# Patient Record
Sex: FEMALE | Race: WHITE | Employment: OTHER | URBAN - METROPOLITAN AREA
[De-identification: names, ages, dates, MRNs, and addresses within clinical notes are randomized per-mention and may not be internally consistent; named-entity substitution may affect disease eponyms.]

---

## 2020-08-25 ENCOUNTER — TELEPHONE (OUTPATIENT)
Dept: PAIN MANAGEMENT | Age: 80
End: 2020-08-25

## 2020-09-11 ENCOUNTER — HOSPITAL ENCOUNTER (OUTPATIENT)
Dept: PAIN MANAGEMENT | Age: 80
Discharge: HOME | End: 2020-09-11
Payer: MEDICARE

## 2020-09-11 PROCEDURE — 99443 PR PHYS/QHP TELEPHONE EVALUATION 21-30 MIN: CPT | Performed by: PAIN MEDICINE

## 2020-09-11 PROCEDURE — 99203 OFFICE O/P NEW LOW 30 MIN: CPT

## 2020-09-11 ASSESSMENT — ENCOUNTER SYMPTOMS
EYE REDNESS: 0
SINUS PAIN: 0
SHORTNESS OF BREATH: 0
COUGH: 0
WHEEZING: 0
BACK PAIN: 1
STRIDOR: 0
BOWEL INCONTINENCE: 0

## 2020-09-11 NOTE — CONSULTS
Petra Trimble is a 78 y.o. female evaluated on 9/11/2020. Patient is referred by Heather Keane MD   Modality of virtual service provided -via  telephone   Consent:  Patient and/or health care decision maker is aware that that patient may receive a bill for this telephone service, depending on one's insurance coverage, and has provided verbal consent to proceed: Yes    Patient identification was verified at the start of the visit: Yes    Chief complaint: Petar Trimble is 78 y.o.,  female, with chief complaint of back pain. Referring Diagnosis   M48.061 (ICD-10-CM) - Spinal stenosis, lumbar     Patient is an 57-year-old female with a chief complaint of pain involving the low back with pain radiating towards the left knee of longstanding duration. Patient's pain is lately getting worse. She did physical therapy in the past which did not help the pain. Sometimes the pain radiates towards the left foot. Patient pain is lately getting worse slowly. She was evaluated by neurosurgeon who referred her to the pain clinic. Back Pain   This is a chronic problem. The current episode started more than 1 year ago. The problem occurs constantly. The problem is unchanged. The pain is present in the lumbar spine and sacro-iliac. The quality of the pain is described as aching Earlis Spisarah). The pain radiates to the left foot and left knee. The pain is at a severity of 6/10 (2-10). The pain is moderate. Worse during:  worse as the day progresses. The symptoms are aggravated by bending, standing and twisting (Walking, ADLs  lifting ). Associated symptoms include tingling. Pertinent negatives include no abdominal pain, bladder incontinence, bowel incontinence, chest pain, dysuria, numbness or weakness. Risk factors include lack of exercise. Alleviating factors:rest   Lifestyle changes experienced with pain: Prevents or limits ADLs, Increases w/activity.    Increases w/prolonged Alcohol use: No     Alcohol/week: 0.0 standard drinks    Drug use: No    Sexual activity: Yes     Partners: Male     Birth control/protection: Surgical, Post-menopausal     Comment: hysterectomy BSO   Lifestyle    Physical activity     Days per week: None     Minutes per session: None    Stress: None   Relationships    Social connections     Talks on phone: None     Gets together: None     Attends [employer] service: None     Active member of club or organization: None     Attends meetings of clubs or organizations: None     Relationship status: None    Intimate partner violence     Fear of current or ex partner: None     Emotionally abused: None     Physically abused: None     Forced sexual activity: None   Other Topics Concern    None   Social History Narrative    None       Allergies   Allergen Reactions    Duricef [Cefadroxil] Hives    Macrobid [Nitrofurantoin Monohyd Macro] Hives       Current Outpatient Medications on File Prior to Encounter   Medication Sig Dispense Refill    atenolol (TENORMIN) 50 MG tablet Take 50 mg by mouth daily.  butalbital-acetaminophen-caffeine (FIORICET, ESGIC) per tablet Take 1 tablet by mouth every 4 hours as needed.  celecoxib (CELEBREX) 200 MG capsule Take 200 mg by mouth daily.  metFORMIN (GLUCOPHAGE) 500 MG tablet Take 500 mg by mouth daily (with breakfast).  omeprazole (PRILOSEC) 20 MG capsule Take 20 mg by mouth daily.  Multiple Vitamins-Minerals (PX SENIOR VITAMIN PO) Take  by mouth.  Calcium Carbonate-Vitamin D 600-400 MG-UNIT CHEW Take  by mouth 2 times daily. No current facility-administered medications on file prior to encounter. Review of Systems   Constitutional: Negative. Negative for activity change, appetite change, fatigue and unexpected weight change. HENT: Negative. Negative for congestion, hearing loss and sinus pain. Eyes: Negative. Negative for redness and visual disturbance.    Respiratory: Negative. Negative for cough, shortness of breath, wheezing and stridor. Cardiovascular: Negative. Negative for chest pain and palpitations. Gastrointestinal: Negative. Negative for abdominal pain, bowel incontinence, constipation, diarrhea and nausea. Endocrine: Negative. Negative for cold intolerance and polyuria. History of diabetes mellitus type 2 diabetes mellitus   Genitourinary: Negative for bladder incontinence, dysuria and hematuria. Musculoskeletal: Positive for back pain. Negative for arthralgias. Neurological: Positive for tingling. Negative for tremors, weakness and numbness. Hematological: Negative. Does not bruise/bleed easily. Psychiatric/Behavioral: Negative for self-injury, sleep disturbance and suicidal ideas. The patient is not nervous/anxious. Physical Exam  Skin:         Neurological:      Mental Status: She is alert and oriented to person, place, and time. Psychiatric:         Mood and Affect: Mood normal.            DATA:  LAB.:  Care Everywhere Result Report  Comprehensive metabolic panelResulted: 43/41/9808 11:39 AM  PushCall  Component Name Value Ref Range   Sodium 138 134 - 146 mmol/L   Potassium, Bld 4.4 3.5 - 5 mmol/L   Chloride 104 98 - 109 mmol/L   CO2 26 22 - 32 mmol/L   Anion gap 8 4 - 12 mmol/L   BUN 24 5 - 27 mg/dL   Creatinine 0.90   Comment: METHOD TRACEABLE TO IDMS STANDARD 0.4 - 1 mg/dL   Glucose 116 (H) 65 - 99 mg/dL   Calcium 9.3 8.5 - 10.5 mg/dL   Total Protein 6.8 6 - 8 g/dL   Albumin 4.1 3.2 - 5.3 g/dL   Alkaline Phosphatase 63 39 - 130 U/L   AST 22 0 - 41 U/L   ALT 15 0 - 31 U/L   Total bilirubin 0.4 0.3 - 1.2 mg/dL   GFR MDRD Non Af Amer >60 >59 ml/min/1.73sq. m   GFR MDRD Af Amer >60 >59 ml/min/1.73sq. m       X-Ray reports:  MR lumbar without contrast8/18/2020  PushCall  Result Narrative         History: Pain.     Study: Low back pain    Technique: Multiplanar/multisequence images were obtained of the lumbar spine. No contrast was used. Findings: Position of the conus medullaris is within normal limits . Vertebral body heights and alignments are normal. The marrow signal is normal.    L1-2 disc space is normal    L2-L3 disc space is normal    L3-L4 has facet joint and ligamentum flavum hypertrophy with broad-based disc bulging resulting in moderate canal narrowing.  There is mild left and moderate right foraminal narrowing     L4-L5has facet joint and ligamentum flavum hypertrophy with broad-based disc bulging results in moderate canal narrowing.  There is broad-based disc bulging resulting in bilateral inferior foraminal narrowing     L5-S1has facet joint arthropathy and ligamentum flavum hypertrophy without significant canal narrowing.  There is broad-based disc bulging causing left foraminal stenosis      Impression:  Multilevel degenerative change with left foraminal stenosis L5-S1. Finalized by Evelyn Luther MD on 8/18/2020 12     Clinical  impression:  1. Lumbar radiculopathy    2. DDD (degenerative disc disease), lumbar    3. Lumbosacral spondylosis without myelopathy        Patient's   [] x-ray    [] CT scan    [x] MRI  Were/was  Reviewed. These findings are consistent with the patient's symptoms and physical examination. [x] Patient's findings on the x-ray were explained to the patient     Other reports reviewed include    [] Bone scan   [] EMG and nerve conduction studies   [x] Referral reports-  I also discussed with him the following treatment options Including advantages and disadvantages of each:    [x] Physical therapy    [x] Interventional pain treatment    [] Medication management    [] Surgical options    Patient's OARRS were reviewed. It is acceptable and appears patient is not receiving prescriptions from multiple prescribers.   Patient is  forthcoming regarding prescriptions for pain medication in the past  Controlled Substances Monitoring:        Counselling/Preventive measures for pain  Control:    [x]  Spine strengthening exercises are discussed with patient in detail. The following treatment plan was developed after discussion with patient:    We discussed transforaminal lumbar Epidural steroid Injections x 1  at L5-S1 on the left side    Patient tried and failed NSAIDS,Home exercises, Physical Therapy, Chiropractic manipulations without relief. Patient exhibited signs of radiculopathy on left    Patient has not had prior Lumbar Surgery. We will see the patient in 2 weeks after the procedures and re-evaluate symptoms. Orders Placed This Encounter   Procedures    FLUORO FOR SURGICAL PROCEDURES     Standing Status:   Future     Standing Expiration Date:   9/11/2021    SD INJECT ANES/STEROID FORAMEN LUMBAR/SACRAL W IMG GUIDE ,1 LEVEL    Saline lock IV     Standing Status:   Future     Standing Expiration Date:   3/11/2022       Decision Making Process : Patient's health history and referral records thoroughly reviewed before focused physical examination and discussion with patient. Over 50% of today's visit is spent on examining the patient and counseling. Level of complexity of date to be reviewed is Moderate. The chart date reviewed include the following: Imaging Reports. Summary of Care. Time spent reviewing with patient the below reports:   Medication safety, Treatment options. Level of diagnosis and management options of this case is multiple: involving the following management options: Interventions as needed, medication management as appropriate, future visits, activity modification, heat/ice as needed, Urine drug screen as required. [x]The patient's questions were answered to the best of my abilities. Due to the COVID-19 pandemic and the appropriate interventions by [facility], our non-urgent pain management patients will not be seen in the office at this time for their protection and the protection of our staff.  To offer continuity of care, their prescriptions will be escribed this month after a careful chart review and review of their OARRS report  Pursuant to the emergency declaration under the [employer] [facility], [facility]5 waiver authority and the [employer] Act, this Virtual Visit was conducted, with patient's consent, to reduce the patient's risk of exposure to COVID-19 and provide continuity of care for an established patient. Services were provided through a video synchronous discussion virtually to substitute for in-person appointment. \"  Documentation:  I communicated with the patient and/or health care decision maker about plan of care  Details of this discussion including any medical advice provided: Total Time: 45 to 55 minutes    I affirm this is a Patient Initiated Episode with an Established Patient who has not had a related appointment within my department in the past 7 days or scheduled within the next 24 hours. This note was created using voice recognition software. There may be inaccuracies of transcription  that are inadvertently overlooked prior to the signature. There is any questions about the transcription please contact me.     Electronically signed by Lindsey Gamboa MD on 9/11/2020 at 9:45 AM

## 2020-09-12 ASSESSMENT — ENCOUNTER SYMPTOMS
CONSTIPATION: 0
EYES NEGATIVE: 1
GASTROINTESTINAL NEGATIVE: 1
ABDOMINAL PAIN: 0
DIARRHEA: 0
RESPIRATORY NEGATIVE: 1
NAUSEA: 0

## 2020-10-05 NOTE — PROGRESS NOTES
Shana Raines is a 78 y.o. female evaluated on 10/8/2020. Chief complaint: Shana Raines is 78 y.o.,  female, with low back pain with pain radiating to the left lower extremity. HPI   Patient is an 49-year-old female referred to the pain clinic for back pain and is scheduled for a transforaminal lumbar epidural steroid injection today. Initial visit was on 2020 which was a virtual visit. A physical examination is not done at that time. A physical examination is going to be done today to complete the evaluation.   Please refer to the notes on 2024 complete history      Past Medical History:   Diagnosis Date    Arthritis     Diabetes mellitus ([address]     High cholesterol     Migraines     Vision abnormalities        Past Surgical History:   Procedure Laterality Date    APPENDECTOMY      CHOLECYSTECTOMY      COLONOSCOPY  10/12/07    HYSTERECTOMY      Ovaries removed    ORIF HUMERUS DECOMPRESSION Left 2010    SALPINGO-OOPHORECTOMY  1983    TONSILLECTOMY AND ADENOIDECTOMY  1962       Family History   Problem Relation Age of Onset    Breast Cancer Paternal Cousin         >48 yo    Cancer Neg Hx     Colon Cancer Neg Hx     Diabetes Neg Hx     Eclampsia Neg Hx     Hypertension Neg Hx     Ovarian Cancer Neg Hx      Labor Neg Hx     Spont Abortions Neg Hx     Stroke Neg Hx        Social History     Socioeconomic History    Marital status:      Spouse name: None    Number of children: None    Years of education: None    Highest education level: None   Occupational History    None   Social Needs    Financial resource strain: None    Food insecurity     Worry: None     Inability: None    Transportation needs     Medical: None     Non-medical: None   Tobacco Use    Smoking status: Never Smoker    Smokeless tobacco: Never Used   Substance and Sexual Activity    Alcohol use: No     Alcohol/week: 0.0 standard drinks    Drug use: No    and reactive to light. Neck:      Musculoskeletal: Normal range of motion and neck supple. No muscular tenderness. Cardiovascular:      Rate and Rhythm: Normal rate. Pulmonary:      Effort: Pulmonary effort is normal.      Breath sounds: Normal breath sounds. Abdominal:      General: Bowel sounds are normal.      Palpations: Abdomen is soft. Musculoskeletal:      Right lower leg: No edema. Left lower leg: No edema. Skin:     Findings: No erythema or lesion. Neurological:      Mental Status: She is alert and oriented to person, place, and time. Cranial Nerves: No cranial nerve deficit. Sensory: No sensory deficit. Psychiatric:         Mood and Affect: Mood normal.         Behavior: Behavior normal.         Thought Content: Thought content normal.         Judgment: Judgment normal.        Right Ankle Exam     Muscle Strength   The patient has normal right ankle strength. Left Ankle Exam     Muscle Strength   The patient has normal left ankle strength. Right Knee Exam     Muscle Strength   The patient has normal right knee strength. Left Knee Exam     Muscle Strength   The patient has normal left knee strength. Right Hip Exam     Muscle Strength   The patient has normal right hip strength. Left Hip Exam     Muscle Strength   The patient has normal left hip strength. Back Exam     Tenderness   The patient is experiencing tenderness in the lumbar and sacroiliac. Range of Motion   Back extension: Limited and painful. Back flexion: Limited and painful. Back lateral bend right: Limited and painful. Back lateral bend left: Limited and painful. Back rotation right: Limited and painful. Back rotation left: Limited and painful.      Tests   Straight leg raise left: positive    Other   Sensation: normal  Gait: antalgic   Erythema: no back redness  Scars: absent          DATA:      X-Ray reports:  MR lumbar without contrast8/18/2020  [facility] [facility]  Result Narrative         History: Pain. Study: Low back pain    Technique: Multiplanar/multisequence images were obtained of the lumbar spine. No contrast was used. Findings: Position of the conus medullaris is within normal limits . Vertebral body heights and alignments are normal. The marrow signal is normal.    L1-2 disc space is normal    L2-L3 disc space is normal    L3-L4 has facet joint and ligamentum flavum hypertrophy with broad-based disc bulging resulting in moderate canal narrowing.  There is mild left and moderate right foraminal narrowing     L4-L5has facet joint and ligamentum flavum hypertrophy with broad-based disc bulging results in moderate canal narrowing.  There is broad-based disc bulging resulting in bilateral inferior foraminal narrowing     L5-S1has facet joint arthropathy and ligamentum flavum hypertrophy without significant canal narrowing.  There is broad-based disc bulging causing left foraminal stenosis      Impression:  Multilevel degenerative change with left foraminal stenosis L5-S1. Finalized by Samaria Damon MD on 8/18/2020 12       Clinical  impression:  1. Lumbar radiculopathy    2. DDD (degenerative disc disease), lumbar    3. Lumbosacral spondylosis without myelopathy        Plan of care: As planned we will proceed with transforaminal lumbar epidural steroid injection at  The following treatment plan was developed after discussion with patient:    We discussed transforaminal lumbar Epidural steroid Injections x 1  at L5-S1/l4-5 on the left side    Patient tried and failed NSAIDS,Home exercises, Physical Therapy, Chiropractic manipulations without relief. Patient exhibited signs of radiculopathy with positive straight leg raising test on left    Patient has not had prior Lumbar Surgery. We will see the patient in 2 weeks after the procedures and re-evaluate symptoms.     The procedure risks as well as alternate therapies were discussed with the patient

## 2020-10-08 ENCOUNTER — HOSPITAL ENCOUNTER (OUTPATIENT)
Dept: GENERAL RADIOLOGY | Age: 80
Discharge: HOME | End: 2020-10-10
Payer: MEDICARE

## 2020-10-08 ENCOUNTER — HOSPITAL ENCOUNTER (OUTPATIENT)
Dept: PAIN MANAGEMENT | Age: 80
Discharge: HOME | End: 2020-10-08
Payer: MEDICARE

## 2020-10-08 VITALS
SYSTOLIC BLOOD PRESSURE: 153 MMHG | TEMPERATURE: 97.9 F | RESPIRATION RATE: 14 BRPM | DIASTOLIC BLOOD PRESSURE: 79 MMHG | OXYGEN SATURATION: 98 % | HEART RATE: 59 BPM | HEIGHT: 62 IN | BODY MASS INDEX: 27.6 KG/M2 | WEIGHT: 150 LBS

## 2020-10-08 PROCEDURE — 6360000004 HC RX CONTRAST MEDICATION: Performed by: PAIN MEDICINE

## 2020-10-08 PROCEDURE — 62323 NJX INTERLAMINAR LMBR/SAC: CPT

## 2020-10-08 PROCEDURE — 6360000002 HC RX W HCPCS: Performed by: PAIN MEDICINE

## 2020-10-08 PROCEDURE — 3209999900 FLUORO FOR SURGICAL PROCEDURES

## 2020-10-08 PROCEDURE — 64483 NJX AA&/STRD TFRM EPI L/S 1: CPT | Performed by: PAIN MEDICINE

## 2020-10-08 PROCEDURE — 2500000003 HC RX 250 WO HCPCS: Performed by: PAIN MEDICINE

## 2020-10-08 RX ORDER — TRAMADOL HYDROCHLORIDE 50 MG/1
50 TABLET ORAL EVERY 6 HOURS PRN
COMMUNITY

## 2020-10-08 RX ORDER — SIMVASTATIN 40 MG
40 TABLET ORAL NIGHTLY
COMMUNITY

## 2020-10-08 RX ORDER — LIDOCAINE HYDROCHLORIDE 10 MG/ML
INJECTION, SOLUTION EPIDURAL; INFILTRATION; INTRACAUDAL; PERINEURAL
Status: COMPLETED | OUTPATIENT
Start: 2020-10-08 | End: 2020-10-08

## 2020-10-08 RX ORDER — TRIAMCINOLONE ACETONIDE 40 MG/ML
INJECTION, SUSPENSION INTRA-ARTICULAR; INTRAMUSCULAR
Status: COMPLETED | OUTPATIENT
Start: 2020-10-08 | End: 2020-10-08

## 2020-10-08 RX ORDER — CARVEDILOL 25 MG/1
25 TABLET ORAL 2 TIMES DAILY WITH MEALS
COMMUNITY

## 2020-10-08 RX ORDER — BENAZEPRIL HYDROCHLORIDE 10 MG/1
10 TABLET ORAL DAILY
COMMUNITY

## 2020-10-08 RX ADMIN — LIDOCAINE HYDROCHLORIDE 4 ML: 10 INJECTION, SOLUTION EPIDURAL; INFILTRATION; INTRACAUDAL; PERINEURAL at 10:12

## 2020-10-08 RX ADMIN — IOHEXOL 1 ML: 180 INJECTION INTRAVENOUS at 10:13

## 2020-10-08 RX ADMIN — TRIAMCINOLONE ACETONIDE 80 MG: 40 INJECTION, SUSPENSION INTRA-ARTICULAR; INTRAMUSCULAR at 10:13

## 2020-10-08 ASSESSMENT — PAIN DESCRIPTION - ONSET: ONSET: GRADUAL

## 2020-10-08 ASSESSMENT — PAIN DESCRIPTION - ORIENTATION: ORIENTATION: LEFT

## 2020-10-08 ASSESSMENT — PAIN DESCRIPTION - FREQUENCY: FREQUENCY: CONTINUOUS

## 2020-10-08 ASSESSMENT — PAIN - FUNCTIONAL ASSESSMENT
PAIN_FUNCTIONAL_ASSESSMENT: PREVENTS OR INTERFERES SOME ACTIVE ACTIVITIES AND ADLS
PAIN_FUNCTIONAL_ASSESSMENT: 0-10

## 2020-10-08 ASSESSMENT — PAIN DESCRIPTION - PAIN TYPE: TYPE: CHRONIC PAIN

## 2020-10-08 ASSESSMENT — PAIN DESCRIPTION - PROGRESSION: CLINICAL_PROGRESSION: GRADUALLY WORSENING

## 2020-10-08 ASSESSMENT — PAIN SCALES - GENERAL: PAINLEVEL_OUTOF10: 8

## 2020-10-08 ASSESSMENT — PAIN DESCRIPTION - DESCRIPTORS: DESCRIPTORS: SHARP;SHOOTING;CONSTANT

## 2020-10-08 NOTE — PROCEDURES
Pre-Procedure Note    Patient Name: Marjan Retana   YOB: 1940  Room/Bed: Room/bed info not found  Medical Record Number: [id number]  Date: 10/8/2020       Indication:  Active Problems:    * No active hospital problems. *  Resolved Problems:    * No resolved hospital problems. *       Consent: On file. Vital Signs:   Vitals:    10/08/20 1021   BP: (!) 153/79   Pulse: 59   Resp: 14   Temp:    SpO2: 98%       Past Medical History:   has a past medical history of Arthritis, Diabetes mellitus ([address] High cholesterol, Migraines, and Vision abnormalities. Past Surgical History:   has a past surgical history that includes Colonoscopy (10/12/07); Tonsillectomy and adenoidectomy (1962); Appendectomy (1949); Cholecystectomy (1971); Salpingo-oophorectomy (1983); orif humerus decompression (Left, 2010); and Hysterectomy (1983). Pre-Sedation Documentation and Exam:   Vital signs have been reviewed (see flow sheet for vitals). Mallampati Airway Assessment:  normal    ASA Classification:  Class 3 - A patient with severe systemic disease that limits activity but is not incapacitating    Sedation/ Anesthesia Plan:   intravenous sedation  as needed. Medications Planned:   midazolam (Versed) / Fentanyl  Intravenously  as needed. Patient is an appropriate candidate for plan of sedation: yes  Patient's History and Physical examination was reviewed and there is no change. Electronically signed by Cortez Pa MD on 10/8/2020 at 10:26 AM        Preoperative Diagnosis:    1. Lumbar radiculopathy    2. DDD (degenerative disc disease), lumbar    3. Lumbosacral spondylosis without myelopathy         Postoperative diagnosis:    1. Lumbar radiculopathy    2. DDD (degenerative disc disease), lumbar    3.  Lumbosacral spondylosis without myelopathy         Procedure Performed:  Transforaminal lumbar epidural steroid injection at the levels of L4 - L5 on the Left side under fluoroscopic guidance without IV sedation. Indication for the Procedure: The patient failed conservative management  for pain in the low back radiating to lower extremities. As the patient is not responding to conservative management and pain is interfering with activities of daily living, we decided to proceed with transforaminal lumbar epidural steroid injection as symptoms are mostly following the nerve root distribution. The procedure and the risks were discussed with the patient and informed consent was obtained. Current Pain Assessment  Pain Assessment  Pain Assessment: 0-10  Pain Level: 8  Patient's Stated Pain Goal: 2(to decrease pain with increased activity)  Pain Type: Chronic pain  Pain Location: Back, Buttocks, Hip, Leg, Knee, Foot  Pain Orientation: Left  Pain Radiating Towards: none  Pain Descriptors: Sharp, Shooting, Constant  Pain Frequency: Continuous  Pain Onset: Gradual  Clinical Progression: Gradually worsening  Functional Pain Assessment: Prevents or interferes some active activities and ADLs  Non-Pharmaceutical Pain Intervention(s): Rest, Repositioned, Relaxation techniques     Procedure:    . The patient's vital signs including BP, EKG and SaO2 were monitored by the RN and they remained stable during the procedure. A meaningful communication was kept up with the patient throughout   the procedure. The patient is placed in prone position. The skin over the back was prepped and draped in sterile manner. Then the skin and deep tissues just above the tip of the transverse process of L-5 vertebra on Left side were infiltrated with about 4 ml of 1% lidocaine. The #22-gauge, 5 inch spinal needle was inserted through the skin wheal  and under fluoroscopy guidance was directed such that the tip of the needle lies in the neuroforamina at about the 6 o'clock position under the pedicle of the L-4 vertebra. This was confirmed with AP and lateral views of the fluoroscopy.    Then after negative aspiration a total of 1 ml of Omnipaque-180 was injected through the needle and spread of the contrast in the epidural space as well as along the nerve root was observed. Then after negative aspiration a total of 80 mg of triamcinolone and 3 ml of normal saline was injected through the needle. The needle is removed and a Band-Aid was placed over the needle  insertion site. The patient's vital signs remained stable and the patient tolerated the procedure well. The patient was discharged home in stable condition and will be followed in the pain clinic in the next few weeks for further planning.     Electronically signed by Cortez Pa MD on 10/8/2020 at 10:26 AM

## 2020-10-08 NOTE — PROGRESS NOTES
Discharge criteria met. Patient alert and oriented x3  Post procedure dressing dry and intact. Sensory and motor function intact as per pre-procedure. Instructions and follow up reviewed with pt at patient at discharge. Patient discharged ambulatory @ 10:30am  With .  to drive pt home and care for her today. Steady gait.

## 2020-10-12 ENCOUNTER — TELEPHONE (OUTPATIENT)
Dept: PAIN MANAGEMENT | Age: 80
End: 2020-10-12

## 2020-10-22 ENCOUNTER — HOSPITAL ENCOUNTER (OUTPATIENT)
Dept: PAIN MANAGEMENT | Age: 80
Discharge: HOME | End: 2020-10-22
Payer: MEDICARE

## 2020-10-22 PROCEDURE — 99213 OFFICE O/P EST LOW 20 MIN: CPT

## 2020-10-22 PROCEDURE — 99443 PR PHYS/QHP TELEPHONE EVALUATION 21-30 MIN: CPT | Performed by: PAIN MEDICINE

## 2020-10-22 ASSESSMENT — ENCOUNTER SYMPTOMS
BACK PAIN: 1
CONSTIPATION: 0
EYE REDNESS: 0
WHEEZING: 0
RESPIRATORY NEGATIVE: 1
NAUSEA: 0
SHORTNESS OF BREATH: 0
VOMITING: 0
SORE THROAT: 0
EYES NEGATIVE: 1
BOWEL INCONTINENCE: 0
ABDOMINAL PAIN: 0
COUGH: 0
DIARRHEA: 0
PHOTOPHOBIA: 0
STRIDOR: 0
SINUS PAIN: 0
GASTROINTESTINAL NEGATIVE: 1

## 2020-10-22 NOTE — PROGRESS NOTES
Belem Sumner is a 78 y.o. female evaluated on 10/22/2020. Modality of virtual service provided -via  telephone   Consent:  Patient and/or health care decision maker is aware that that patient may receive a bill for this telephone service, depending on one's insurance coverage, and has provided verbal consent to proceed: Yes    Patient identification was verified at the start of the visit: Yes    Chief complaint: Belem Sumner is 78 y.o.,  female, with chief complaint of back pain. Referring Diagnosis   M48.061 (ICD-10-CM) - Spinal stenosis, lumbar     Patient is complaining of pain involving the low back area with pain radiating to left lower extremity. Most of the time the pain radiates from the low back to the left knee and occasionally towards the ankle. She had undergone lumbar epidural steroid injection on 10/8/2020 without any improvement in the pain. She reports since the procedure she is getting muscle spasms in the lower extremities which occasionally wakes her up from the sleep. Back Pain   This is a chronic problem. The current episode started more than 1 year ago. The problem occurs constantly. The problem is unchanged. The pain is present in the lumbar spine and sacro-iliac. The quality of the pain is described as aching [place]). The pain radiates to the left foot and left knee. The pain is at a severity of 6/10 (2-10). The pain is moderate. Worse during:  worse as the day progresses. The symptoms are aggravated by bending, standing and twisting (Walking, ADLs  lifting ). Associated symptoms include tingling. Pertinent negatives include no abdominal pain, bladder incontinence, bowel incontinence, chest pain, dysuria, numbness or weakness. Risk factors include lack of exercise.       Alleviating factors:rest   Lifestyle changes experienced with pain: Wakes from sleep, Prevents or limits ADLs, Increases w/activity. , Increases w/prolonged sitting/standing/walking  Mood changes,none  Patient currently unemployed. Physical therapy did not help the pain. Are you under psychological counseling at present: No  Goals for treatment include:  Decrease in pain  Enjoy daily and recreational activities, return to previous status.     Last procedure was lumbar epidural steroid injection and 10/08/2020      ACTIVITY/SOCIAL/EMOTIONAL:  Sleep Pattern: 7 hours per night. nightime awakenings  Home Exercises:  none  Activity:not significantly changed  Emotional Issues: normal.   Currently seeing a Psychiatrist or Psychologist:  No      Past Medical History:   Diagnosis Date    Arthritis     Diabetes mellitus ([facility] [address]     High cholesterol     Migraines     Vision abnormalities        Past Surgical History:   Procedure Laterality Date    APPENDECTOMY      CHOLECYSTECTOMY      COLONOSCOPY  10/12/07    HYSTERECTOMY  1983    Ovaries removed    ORIF HUMERUS DECOMPRESSION Left 2010    SALPINGO-OOPHORECTOMY  1983    TONSILLECTOMY AND ADENOIDECTOMY         Family History   Problem Relation Age of Onset    Breast Cancer Paternal Cousin         >46 yo    Cancer Neg Hx     Colon Cancer Neg Hx     Diabetes Neg Hx     Eclampsia Neg Hx     Hypertension Neg Hx     Ovarian Cancer Neg Hx      Labor Neg Hx     Spont Abortions Neg Hx     Stroke Neg Hx        Social History     Socioeconomic History    Marital status:      Spouse name: None    Number of children: None    Years of education: None    Highest education level: None   Occupational History    None   Social Needs    Financial resource strain: None    Food insecurity     Worry: None     Inability: None    Transportation needs     Medical: None     Non-medical: None   Tobacco Use    Smoking status: Never Smoker    Smokeless tobacco: Never Used   Substance and Sexual Activity    Alcohol use: No     Alcohol/week: 0.0 standard drinks    Drug use: No    Sexual activity: Yes     Partners: Male     Birth control/protection: Surgical, Post-menopausal     Comment: hysterectomy BSO   Lifestyle    Physical activity     Days per week: None     Minutes per session: None    Stress: None   Relationships    Social connections     Talks on phone: None     Gets together: None     Attends Church service: None     Active member of club or organization: None     Attends meetings of clubs or organizations: None     Relationship status: None    Intimate partner violence     Fear of current or ex partner: None     Emotionally abused: None     Physically abused: None     Forced sexual activity: None   Other Topics Concern    None   Social History Narrative    None       Allergies   Allergen Reactions    Duricef [Cefadroxil] Hives    Lipitor [Atorvastatin Calcium]      Muscle pain    Macrobid [Nitrofurantoin Monohyd Macro] Hives       Current Outpatient Medications on File Prior to Encounter   Medication Sig Dispense Refill    carvedilol (COREG) 25 MG tablet Take 25 mg by mouth 2 times daily (with meals)      benazepril (LOTENSIN) 10 MG tablet Take 10 mg by mouth daily      traMADol (ULTRAM) 50 MG tablet Take 50 mg by mouth every 6 hours as needed for Pain.  simvastatin (ZOCOR) 40 MG tablet Take 40 mg by mouth nightly      atenolol (TENORMIN) 50 MG tablet Take 50 mg by mouth daily.  butalbital-acetaminophen-caffeine (FIORICET, ESGIC) per tablet Take 1 tablet by mouth every 4 hours as needed.  celecoxib (CELEBREX) 200 MG capsule Take 200 mg by mouth daily.  metFORMIN (GLUCOPHAGE) 500 MG tablet Take 500 mg by mouth daily (with breakfast).  omeprazole (PRILOSEC) 20 MG capsule Take 20 mg by mouth daily.  Multiple Vitamins-Minerals (PX SENIOR VITAMIN PO) Take  by mouth.  Calcium Carbonate-Vitamin D 600-400 MG-UNIT CHEW Take  by mouth 2 times daily. No current facility-administered medications on file prior to encounter. Review of Systems   Constitutional: Negative.   Negative for activity change, appetite change, fatigue and unexpected weight change. HENT: Negative. Negative for congestion, hearing loss, sinus pain and sore throat. Eyes: Negative. Negative for photophobia, redness and visual disturbance. Respiratory: Negative. Negative for cough, shortness of breath, wheezing and stridor. Cardiovascular: Negative. Negative for chest pain and palpitations. Gastrointestinal: Negative. Negative for abdominal pain, bowel incontinence, constipation, diarrhea, nausea and vomiting. Endocrine: Negative. Negative for cold intolerance, polydipsia and polyuria. History of diabetes mellitus type 2 diabetes mellitus   Genitourinary: Negative for bladder incontinence, dysuria and hematuria. Musculoskeletal: Positive for back pain. Negative for arthralgias. Skin: Negative for wound. Neurological: Positive for tingling. Negative for tremors, speech difficulty, weakness and numbness. Hematological: Negative. Does not bruise/bleed easily. Psychiatric/Behavioral: Negative for self-injury, sleep disturbance and suicidal ideas. The patient is not nervous/anxious. Physical Exam  Skin:         Neurological:      Mental Status: She is alert and oriented to person, place, and time. Psychiatric:         Mood and Affect: Mood normal.            DATA:    X-Ray reports:  MR lumbar without contrast8/18/2020  [employer]  Result Narrative         History: Pain. Study: Low back pain    Technique: Multiplanar/multisequence images were obtained of the lumbar spine. No contrast was used. Findings: Position of the conus medullaris is within normal limits . Vertebral body heights and alignments are normal. The marrow signal is normal.    L1-2 disc space is normal    L2-L3 disc space is normal    L3-L4 has facet joint and ligamentum flavum hypertrophy with broad-based disc bulging resulting in moderate canal narrowing.   There is mild left and moderate right foraminal narrowing     L4-L5has facet joint and ligamentum flavum hypertrophy with broad-based disc bulging results in moderate canal narrowing. There is broad-based disc bulging resulting in bilateral inferior foraminal narrowing     L5-S1has facet joint arthropathy and ligamentum flavum hypertrophy without significant canal narrowing. There is broad-based disc bulging causing left foraminal stenosis      Impression:  Multilevel degenerative change with left foraminal stenosis L5-S1. Finalized by Scooby Farias MD on 8/18/2020 12       Clinical  impression:  1. Lumbar radiculopathy    2. DDD (degenerative disc disease), lumbar    3. Lumbosacral spondylosis without myelopathy        Plan of care:  I discussed with the patient regarding her options for pain control including surgery medications and repeating epidural steroid injection. Patient would like to avoid surgery if possible. She is not very keen on medications because of her side effects. She would like to try another epidural steroid injection and then depending on the response will plan further treatment. I discussed the procedure and risks and patient wants to proceed with it again. Hence we will schedule it in the near future. The following treatment plan was developed after discussion with patient:    We discussed Lumbar Epidural steroid Injections x 1  at L4 - L5. Patient tried and failed NSAIDS,Home exercises, Physical Therapy, Chiropractic manipulations without relief. Patient exhibited signs of radiculopathy with positive straight leg raising test on left    Patient has not had prior Lumbar Surgery. We will see the patient in 2 weeks after the procedures and re-evaluate symptoms. Handy Lozada     PDMP Monitoring:    Last PDMP Rachelle Santiago as Reviewed [facility]):  Review User Review Instant Review Result   Linda Isaac 10/22/2020  7:05 AM Reviewed PDMP [1]     Counselling/Preventive measures for pain  Control:    [x]  Spine strengthening exercises are discussed with patient in detail. Orders Placed This Encounter   Procedures    Lumbar Epidural Steroid Injection/Caudal     Standing Status:   Future     Standing Expiration Date:   10/22/2021    FLUORO FOR SURGICAL PROCEDURES     Standing Status:   Future     Standing Expiration Date:   10/22/2021    Saline lock IV     Standing Status:   Future     Standing Expiration Date:   4/22/2022       Decision Making Process : Patient's health history and referral records thoroughly reviewed before focused physical examination and discussion with patient. I have spent 25 mins. Over 50% of today's visit is spent on examining the patient and counseling and coordinating the care. Level of complexity of date to be reviewed is Moderate. The chart date reviewed include the following: Imaging Reports. Summary of Care. Time spent reviewing with patient the below reports:   Medication safety, Treatment options. Level of diagnosis and management options of this case is multiple: involving the following management options: Interventions as needed, medication management as appropriate, future visits, activity modification, heat/ice as needed, Urine drug screen as required. [x]The patient's questions were answered to the best of my abilities. This note was created using voice recognition software. There may be inaccuracies of transcription  that are inadvertently overlooked prior to the signature. There is any questions about the transcription please contact me. Digna Nunn Due to the COVID-19 pandemic and the appropriate interventions by Ryan Esteban, our non-urgent pain management patients will not be seen in the office at this time for their protection and the protection of our staff.  To offer continuity of care, their prescriptions will be escribed this month after a careful chart review and review of their OARRS report  Pursuant to the emergency declaration under the [address] and [facility] waiver authority and the [employer] and [employer] Act, this Virtual Visit was conducted, with patient's consent, to reduce the patient's risk of exposure to COVID-19 and provide continuity of care for an established patient. Services were provided through a video synchronous discussion virtually to substitute for in-person appointment. \"  Documentation:  I communicated with the patient and/or health care decision maker about plan of care  Details of this discussion including any medical advice provided: Total Time: minutes: 21-30 minutes    I affirm this is a Patient Initiated Episode with an Established Patient who has not had a related appointment within my department in the past 7 days or scheduled within the next 24 hours.     Electronically signed by Jaz Jiang MD on 10/22/2020 at 4:55 PM

## 2020-10-27 ENCOUNTER — TELEPHONE (OUTPATIENT)
Dept: PAIN MANAGEMENT | Age: 80
End: 2020-10-27

## 2020-10-27 NOTE — TELEPHONE ENCOUNTER
I LM on patient's identified VM. Per Dr Norm Hunt steroid injections can sometimes cause muscle spasms. He says if she received pain relied from last MEMO and would like to proceed with another, he is willing to prescribe a muscle relaxer. I asked patient to call me.

## 2020-10-28 ENCOUNTER — TELEPHONE (OUTPATIENT)
Dept: PAIN MANAGEMENT | Age: 80
End: 2020-10-28

## 2020-10-28 RX ORDER — TIZANIDINE 2 MG/1
2 TABLET ORAL EVERY 8 HOURS PRN
Qty: 90 TABLET | Refills: 1 | Status: SHIPPED | OUTPATIENT
Start: 2020-10-28 | End: 2021-03-19 | Stop reason: SDUPTHER

## 2020-10-28 NOTE — TELEPHONE ENCOUNTER
I returned patient's call. She states she would like to try the muscle relaxer Dr Rahat Hall offered to prescribe. She states her  is in the hospital and will be transferred to a rehab facility. She needs to hold off on LESI. Sent staff message to Dr Elisha Zurita to escribe Rx to [address] on [facility].

## 2021-01-12 ENCOUNTER — HOSPITAL ENCOUNTER (OUTPATIENT)
Dept: PAIN MANAGEMENT | Age: 81
Discharge: HOME | End: 2021-01-12
Payer: MEDICARE

## 2021-01-12 DIAGNOSIS — M54.16 LUMBAR RADICULOPATHY: ICD-10-CM

## 2021-01-12 DIAGNOSIS — M47.817 LUMBOSACRAL SPONDYLOSIS WITHOUT MYELOPATHY: Primary | ICD-10-CM

## 2021-01-12 DIAGNOSIS — M51.36 DDD (DEGENERATIVE DISC DISEASE), LUMBAR: ICD-10-CM

## 2021-01-12 DIAGNOSIS — M47.817 ARTHROPATHY OF LUMBOSACRAL FACET JOINT: ICD-10-CM

## 2021-01-12 PROCEDURE — 99443 PR PHYS/QHP TELEPHONE EVALUATION 21-30 MIN: CPT | Performed by: PAIN MEDICINE

## 2021-01-12 PROCEDURE — 99213 OFFICE O/P EST LOW 20 MIN: CPT

## 2021-01-12 ASSESSMENT — ENCOUNTER SYMPTOMS
SORE THROAT: 0
VOICE CHANGE: 0
NAUSEA: 0
WHEEZING: 0
COUGH: 0
CONSTIPATION: 0
SHORTNESS OF BREATH: 0
VOMITING: 0
BACK PAIN: 1
EYE PAIN: 0
EYE REDNESS: 0
ABDOMINAL PAIN: 0

## 2021-01-12 NOTE — PROGRESS NOTES
Adriana Alfaro is a 78 y.o. female evaluated on 1/12/2021. Modality of virtual service provided -via telephone   Consent:  Patient and/or health care decision maker is aware that that patient may receive a bill for this telephone service, depending on one's insurance coverage, and has provided verbal consent to proceed: Yes    Patient identification was verified at the start of the visit: Yes    Chief complaint: Adriana Alfaro is 78 y.o.,  female, with chief complaint of back pain. Referring Diagnosis   M48.061 (ICD-10-CM) - Spinal stenosis, lumbar     Patient is complaining of pain involving the low back area with pain radiating to left lower extremity at times. She had undergone transforaminal lumbar epidural steroid injection on 10/8/2020 without any improvement in her pain. Patient reports her pain is worse and interfering with activities of daily living. She was seen by the neurosurgeon who recommended RFA. Patient reports she and her  had Covid infection and finally recovered from all. She reports she is doing well at this time and would like to proceed with the procedures. Back Pain  This is a chronic problem. The current episode started more than 1 year ago. The problem occurs constantly. The problem is unchanged. The pain is present in the lumbar spine and sacro-iliac. The quality of the pain is described as aching Marney Fass). The pain radiates to the left foot and left knee. The pain is at a severity of 6/10 (2-10). The pain is moderate. Worse during:  worse as the day progresses. The symptoms are aggravated by bending, standing and twisting (Walking, ADLs  lifting ). Associated symptoms include tingling. Pertinent negatives include no abdominal pain, chest pain, dysuria, numbness or weakness. (Mostly in the left lower extremity) Risk factors include lack of exercise.       Alleviating factors:heat, rest  Lifestyle changes experienced with pain: Prevents or limits ADLs, Increases w/activity, Increases w/prolonged sitting/standing/walking  Mood changes,none  Patient currently unemployed. Physical therapy did not help the pain. Are you under psychological counseling at present: No  Goals for treatment include:  Decrease in pain  Enjoy daily and recreational activities, return to previous status.     Last procedure was transforaminal lumbar epidural steroid injection L4-5 on the left side on 10/8/2020 without any improvement    ACTIVITY/SOCIAL/EMOTIONAL:  Sleep Pattern: 6 hours per night. nightime awakenings  Home Exercises: daily Stretching  Activity:unchanged  Emotional Issues: normal.   Currently seeing a Psychiatrist or Psychologist:  No      Past Medical History:   Diagnosis Date    Arthritis     Diabetes mellitus ([facility] [address]     High cholesterol     Migraines     Vision abnormalities        Past Surgical History:   Procedure Laterality Date    APPENDECTOMY      CHOLECYSTECTOMY  26    COLONOSCOPY  10/12/07    HYSTERECTOMY      Ovaries removed    ORIF HUMERUS DECOMPRESSION Left 2010    SALPINGO-OOPHORECTOMY  1983    TONSILLECTOMY AND ADENOIDECTOMY         Family History   Problem Relation Age of Onset    Breast Cancer Paternal Cousin         >48 yo    Cancer Neg Hx     Colon Cancer Neg Hx     Diabetes Neg Hx     Eclampsia Neg Hx     Hypertension Neg Hx     Ovarian Cancer Neg Hx      Labor Neg Hx     Spont Abortions Neg Hx     Stroke Neg Hx        Social History     Socioeconomic History    Marital status:      Spouse name: Not on file    Number of children: Not on file    Years of education: Not on file    Highest education level: Not on file   Occupational History    Not on file   Social Needs    Financial resource strain: Not on file    Food insecurity     Worry: Not on file     Inability: Not on file    Transportation needs     Medical: Not on file     Non-medical: Not on file   Tobacco Use    Smoking status: Never Smoker    Smokeless tobacco: Never Used   Substance and Sexual Activity    Alcohol use: No     Alcohol/week: 0.0 standard drinks    Drug use: No    Sexual activity: Yes     Partners: Male     Birth control/protection: Surgical, Post-menopausal     Comment: hysterectomy BSO   Lifestyle    Physical activity     Days per week: Not on file     Minutes per session: Not on file    Stress: Not on file   Relationships    Social connections     Talks on phone: Not on file     Gets together: Not on file     Attends Church service: Not on file     Active member of club or organization: Not on file     Attends meetings of clubs or organizations: Not on file     Relationship status: Not on file    Intimate partner violence     Fear of current or ex partner: Not on file     Emotionally abused: Not on file     Physically abused: Not on file     Forced sexual activity: Not on file   Other Topics Concern    Not on file   Social History Narrative    Not on file       Allergies   Allergen Reactions    Duricef [Cefadroxil] Hives    Lipitor [Atorvastatin Calcium]      Muscle pain    Macrobid [Nitrofurantoin Monohyd Macro] Hives       Current Outpatient Medications on File Prior to Encounter   Medication Sig Dispense Refill    tiZANidine (ZANAFLEX) 2 MG tablet Take 1 tablet by mouth every 8 hours as needed (muscle spasms) 90 tablet 1    carvedilol (COREG) 25 MG tablet Take 25 mg by mouth 2 times daily (with meals)      benazepril (LOTENSIN) 10 MG tablet Take 10 mg by mouth daily      traMADol (ULTRAM) 50 MG tablet Take 50 mg by mouth every 6 hours as needed for Pain.  simvastatin (ZOCOR) 40 MG tablet Take 40 mg by mouth nightly      atenolol (TENORMIN) 50 MG tablet Take 50 mg by mouth daily.  butalbital-acetaminophen-caffeine (FIORICET, ESGIC) per tablet Take 1 tablet by mouth every 4 hours as needed.  celecoxib (CELEBREX) 200 MG capsule Take 200 mg by mouth daily.       metFORMIN (GLUCOPHAGE) 500 MG tablet Take 500 mg by mouth daily (with breakfast).  omeprazole (PRILOSEC) 20 MG capsule Take 20 mg by mouth daily.  Multiple Vitamins-Minerals (PX SENIOR VITAMIN PO) Take  by mouth.  Calcium Carbonate-Vitamin D 600-400 MG-UNIT CHEW Take  by mouth 2 times daily. No current facility-administered medications on file prior to encounter. Review of Systems   Constitutional: Negative for activity change, appetite change, fatigue and unexpected weight change. HENT: Negative for congestion, hearing loss, sore throat and voice change. Eyes: Negative for pain, redness and visual disturbance. Respiratory: Negative for cough, shortness of breath and wheezing. Cardiovascular: Negative for chest pain and palpitations. Gastrointestinal: Negative for abdominal pain, constipation, nausea and vomiting. Endocrine: Negative for heat intolerance and polyphagia. Genitourinary: Negative for dysuria and hematuria. Musculoskeletal: Positive for back pain. Negative for myalgias. Skin: Negative for rash. Neurological: Positive for tingling. Negative for weakness and numbness. Hematological: Does not bruise/bleed easily. Psychiatric/Behavioral: Negative for self-injury, sleep disturbance and suicidal ideas. The patient is not nervous/anxious. Physical Exam  Skin:         Neurological:      Mental Status: She is alert and oriented to person, place, and time.    Psychiatric:         Mood and Affect: Mood normal.            DATA:  LAB.:  Component Name Value Ref Range   Sodium 136 134 - 146 mmol/L   Potassium, Bld 4.4 3.5 - 5 mmol/L   Chloride 99 98 - 109 mmol/L   CO2 27 22 - 32 mmol/L   Anion gap 10 5 - 15 mmol/L   BUN 27 5 - 27 mg/dL   Creatinine 0.84   Comment: METHOD TRACEABLE TO IDMS STANDARD 0.4 - 1 mg/dL   Glucose 115 (H) 65 - 99 mg/dL   Calcium 9.4 8.5 - 10.5 mg/dL   Total Protein 7.1 6 - 8 g/dL   Albumin 4.2 3.2 - 5.3 g/dL   Alkaline Phosphatase 63 39 - 130 U/L   AST 27 0 - 41 U/L   ALT 27 0 - 31 U/L   Total bilirubin 0.6 0.3 - 1.2 mg/dL   GFR MDRD Non Af Amer >60 >59 ml/min/1.73sq. m       X-Ray reports:    MR lumbar without contrast8/18/2020  [employer]  Result Narrative         History: Pain. Study: Low back pain    Technique: Multiplanar/multisequence images were obtained of the lumbar spine. No contrast was used. Findings: Position of the conus medullaris is within normal limits . Vertebral body heights and alignments are normal. The marrow signal is normal.    L1-2 disc space is normal    L2-L3 disc space is normal    L3-L4 has facet joint and ligamentum flavum hypertrophy with broad-based disc bulging resulting in moderate canal narrowing.  There is mild left and moderate right foraminal narrowing     L4-L5has facet joint and ligamentum flavum hypertrophy with broad-based disc bulging results in moderate canal narrowing.  There is broad-based disc bulging resulting in bilateral inferior foraminal narrowing     L5-S1has facet joint arthropathy and ligamentum flavum hypertrophy without significant canal narrowing.  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 [id number]    Finalized by Adriana Handy MD on 8/18/2020 12:38 PM       Clinical  impression:  1. Lumbosacral spondylosis without myelopathy    2. Arthropathy of lumbosacral facet joint    3. DDD (degenerative disc disease), lumbar    4. Lumbar radiculopathy        Plan of care: The following treatment plan was developed after discussion with patient:    Patient  has axial or localized     lumbar facet pain at  Left ( L1-L2, L2-L3,) L3-L4, L4-L5 and L5-S1  that is worse with hyperextension of the spine relieved by forward flexion. Palpation showed tenderness over the lumbar  facet joints which also correlate well with patients Imaging.      Patient failed all conservative treatment plans which included NSAIDS, activity modifications,home exercises, over the counter remedies, ice, heat and Physical / Chiropractic therapies. Patient's symptoms are gradually worsening with current treatment, interfering with sleep and activities of daily living. We discussed Left  lumbar  facet joint injections at levels  and re-evaluate symptoms in two weeks at an office visit. Patient agreed to the procedure which will be scheduled as soon as possible. All questions satisfactorily answered by me with the use of a spine model. Kaylie Christy PDMP Monitoring:    Last PDMP Leander Heaton as Reviewed [facility]):  Review User Review Instant Review Result   Yobany Douglas 10/22/2020  7:05 AM Reviewed PDMP [1]     Counselling/Preventive measures for pain  Control:    [x]  Spine strengthening exercises are discussed with patient in detail. Orders Placed This Encounter   Procedures    FLUORO FOR SURGICAL PROCEDURES     Standing Status:   Future     Standing Expiration Date:   1/12/2022    NE INJ DX/THER AGNT PARAVERT FACET JOINT, LUMBAR/SAC, 1ST LEVEL    Saline lock IV     Standing Status:   Future     Standing Expiration Date:   7/12/2022     Patient has both axial pain and radicular pain. She did not get much relief  from the epidural steroid injection and so we will try lumbar facet joint injections to help the pain. The procedure risks and alternate therapies were discussed with the patient and patient would like to proceed with the procedure. She will be scheduled for left lumbar facet joint injections in the near future. Decision Making Process : Patient's health history and referral records thoroughly reviewed before focused physical examination and discussion with patient. I have spent 25 mins. Over 50% of today's visit is spent on examining the patient and counseling and coordinating the care. Level of complexity of date to be reviewed is Moderate.  The chart date reviewed include the following: Imaging Reports. Summary of Care. Time spent reviewing with patient the below reports:   Medication safety, Treatment options. Level of diagnosis and management options of this case is multiple: involving the following management options: Interventions as needed, medication management as appropriate, future visits, activity modification, heat/ice as needed, Urine drug screen as required. [x]The patient's questions were answered to the best of my abilities. This note was created using voice recognition software. There may be inaccuracies of transcription  that are inadvertently overlooked prior to the signature. There is any questions about the transcription please contact me. Due to the COVID-19 pandemic and the appropriate interventions by Ryan Esteban, our non-urgent pain management patients will not be seen in the office at this time for their protection and the protection of our staff. To offer continuity of care, their prescriptions will be escribed this month after a careful chart review and review of their OARRS report  Pursuant to the emergency declaration under the [employer] and [facility], 1135 waiver authority and the [employer] and [employer] Act, this Virtual Visit was conducted, with patient's consent, to reduce the patient's risk of exposure to COVID-19 and provide continuity of care for an established patient. Services were provided through a video synchronous discussion virtually to substitute for in-person appointment. \"  Documentation:  I communicated with the patient and/or health care decision maker about plan of care  Details of this discussion including any medical advice provided:   Total Time: minutes: 21-30 minutes    I affirm this is a Patient Initiated Episode with an Established Patient who has not had a related appointment within my department in the past 7 days or scheduled within the next 24 hours.     Electronically signed by Edgar Taylor MD on 1/12/2021 at 9:46 AM

## 2021-01-14 ENCOUNTER — TELEPHONE (OUTPATIENT)
Dept: PAIN MANAGEMENT | Age: 81
End: 2021-01-14

## 2021-01-28 ENCOUNTER — TELEPHONE (OUTPATIENT)
Dept: PAIN MANAGEMENT | Age: 81
End: 2021-01-28

## 2021-01-28 NOTE — TELEPHONE ENCOUNTER
Left message to confirm Monday's injection. Left  the pre-procedure/COVID-19/Vaccine questions and instructions on the voicemail.

## 2021-01-28 NOTE — TELEPHONE ENCOUNTER
Patient calls pain clinic in response to previous call today regarding procedure instructions. Patient states she had the COVID 19 vaccine on 2/22/21. She is scheduled for the 2nd one on 2/12/21. Procedure will be cancelled and she will have to wait at least 2 weeks or more based on how she does with the vaccine before scheduling her procedure. Patient verbalized understanding and will call Dianne in the morning.

## 2021-01-29 ENCOUNTER — TELEPHONE (OUTPATIENT)
Dept: PAIN MANAGEMENT | Age: 81
End: 2021-01-29

## 2021-01-29 NOTE — TELEPHONE ENCOUNTER
I returned patient's call; LM on her identified VM. Patient had to cancel injection d/t receiving the COVID vaccine; she wasn't sure what she is supposed to do. I asked her to call the office.

## 2021-03-01 ENCOUNTER — HOSPITAL ENCOUNTER (OUTPATIENT)
Dept: GENERAL RADIOLOGY | Age: 81
Discharge: HOME | End: 2021-03-03
Payer: MEDICARE

## 2021-03-01 ENCOUNTER — HOSPITAL ENCOUNTER (OUTPATIENT)
Dept: PAIN MANAGEMENT | Age: 81
Discharge: HOME | End: 2021-03-01
Payer: MEDICARE

## 2021-03-01 VITALS
SYSTOLIC BLOOD PRESSURE: 146 MMHG | HEIGHT: 62 IN | HEART RATE: 64 BPM | WEIGHT: 145 LBS | BODY MASS INDEX: 26.68 KG/M2 | DIASTOLIC BLOOD PRESSURE: 74 MMHG | OXYGEN SATURATION: 97 % | RESPIRATION RATE: 16 BRPM | TEMPERATURE: 98.7 F

## 2021-03-01 DIAGNOSIS — M51.36 DDD (DEGENERATIVE DISC DISEASE), LUMBAR: ICD-10-CM

## 2021-03-01 DIAGNOSIS — M47.817 ARTHROPATHY OF LUMBOSACRAL FACET JOINT: ICD-10-CM

## 2021-03-01 DIAGNOSIS — M47.817 LUMBOSACRAL SPONDYLOSIS WITHOUT MYELOPATHY: Primary | ICD-10-CM

## 2021-03-01 DIAGNOSIS — M47.817 LUMBOSACRAL SPONDYLOSIS WITHOUT MYELOPATHY: ICD-10-CM

## 2021-03-01 PROCEDURE — 6360000004 HC RX CONTRAST MEDICATION: Performed by: PAIN MEDICINE

## 2021-03-01 PROCEDURE — 64494 INJ PARAVERT F JNT L/S 2 LEV: CPT

## 2021-03-01 PROCEDURE — 64495 INJ PARAVERT F JNT L/S 3 LEV: CPT | Performed by: PAIN MEDICINE

## 2021-03-01 PROCEDURE — 64493 INJ PARAVERT F JNT L/S 1 LEV: CPT | Performed by: PAIN MEDICINE

## 2021-03-01 PROCEDURE — 3209999900 FLUORO FOR SURGICAL PROCEDURES

## 2021-03-01 PROCEDURE — 64494 INJ PARAVERT F JNT L/S 2 LEV: CPT | Performed by: PAIN MEDICINE

## 2021-03-01 PROCEDURE — 64495 INJ PARAVERT F JNT L/S 3 LEV: CPT

## 2021-03-01 PROCEDURE — 6360000002 HC RX W HCPCS: Performed by: PAIN MEDICINE

## 2021-03-01 PROCEDURE — 2500000003 HC RX 250 WO HCPCS: Performed by: PAIN MEDICINE

## 2021-03-01 PROCEDURE — 64493 INJ PARAVERT F JNT L/S 1 LEV: CPT

## 2021-03-01 RX ORDER — BUPIVACAINE HYDROCHLORIDE 5 MG/ML
INJECTION, SOLUTION EPIDURAL; INTRACAUDAL
Status: COMPLETED | OUTPATIENT
Start: 2021-03-01 | End: 2021-03-01

## 2021-03-01 RX ORDER — TRIAMCINOLONE ACETONIDE 40 MG/ML
INJECTION, SUSPENSION INTRA-ARTICULAR; INTRAMUSCULAR
Status: COMPLETED | OUTPATIENT
Start: 2021-03-01 | End: 2021-03-01

## 2021-03-01 RX ADMIN — BUPIVACAINE HYDROCHLORIDE 20 ML: 5 INJECTION, SOLUTION EPIDURAL; INTRACAUDAL; PERINEURAL at 09:09

## 2021-03-01 RX ADMIN — TRIAMCINOLONE ACETONIDE 80 MG: 40 INJECTION, SUSPENSION INTRA-ARTICULAR; INTRAMUSCULAR at 09:10

## 2021-03-01 RX ADMIN — IOHEXOL 3 ML: 180 INJECTION INTRAVENOUS at 09:09

## 2021-03-01 ASSESSMENT — PAIN - FUNCTIONAL ASSESSMENT
PAIN_FUNCTIONAL_ASSESSMENT: 0-10
PAIN_FUNCTIONAL_ASSESSMENT: PREVENTS OR INTERFERES SOME ACTIVE ACTIVITIES AND ADLS

## 2021-03-01 ASSESSMENT — PAIN DESCRIPTION - DESCRIPTORS: DESCRIPTORS: SHARP;SHOOTING

## 2021-03-01 ASSESSMENT — PAIN DESCRIPTION - ORIENTATION: ORIENTATION: LEFT;LOWER

## 2021-03-01 ASSESSMENT — PAIN DESCRIPTION - DIRECTION: RADIATING_TOWARDS: LEFT LEG

## 2021-03-01 ASSESSMENT — PAIN DESCRIPTION - ONSET: ONSET: ON-GOING

## 2021-03-01 NOTE — PROCEDURES
Pre-Procedure Note    Patient Name: Sarah Byrnes   YOB: 1940  Room/Bed: Room/bed info not found  Medical Record Number: [id number]  Date: 3/1/2021       Indication:    1. Lumbosacral spondylosis without myelopathy    2. Arthropathy of lumbosacral facet joint    3. DDD (degenerative disc disease), lumbar        Consent: On file. Vital Signs:   Vitals:    03/01/21 0908   BP: (!) 175/65   Pulse: 65   Resp: 16   Temp:    SpO2: 98%       Past Medical History:   has a past medical history of Arthritis, Diabetes mellitus ([facility] [address] High cholesterol, Migraines, and Vision abnormalities. Past Surgical History:   has a past surgical history that includes Colonoscopy (10/12/07); Tonsillectomy and adenoidectomy (1962); Appendectomy (1949); Cholecystectomy (1971); Salpingo-oophorectomy (1983); orif humerus decompression (Left, 2010); and Hysterectomy (1983). Pre-Sedation Documentation and Exam:   Vital signs have been reviewed (see flow sheet for vitals). Mallampati Airway Assessment:  normal    ASA Classification:  Class 3 - A patient with severe systemic disease that limits activity but is not incapacitating    Sedation/ Anesthesia Plan:   intravenous sedation   as needed    Medications Planned:   midazolam (Versed) / Fentanyl  Intravenously  as needed. Patient is an appropriate candidate for plan of sedation: yes  Patient's History and Physical examination was reviewed and there is no change. Electronically signed by Jose Maria Lange MD on 3/1/2021 at 9:18 AM        Preoperative Diagnosis:    1. Lumbosacral spondylosis without myelopathy    2. Arthropathy of lumbosacral facet joint    3. DDD (degenerative disc disease), lumbar        Postoperative Diagnosis:   1. Lumbosacral spondylosis without myelopathy    2. Arthropathy of lumbosacral facet joint    3.  DDD (degenerative disc disease), lumbar        Procedure Performed[de-identified]  Lumbar facet joint injections at the levels of L1-L2, L2-L3, L3-L4, L4-L5, L5-S1 on the Left side with fluoroscopy guidance, without IV sedation. Indication for the Procedure: The patient had history of chronic low back pain which is not responding well to the conservative treatment. The patient's pain is mostly axial in nature. Pain is interfering with the activities of daily living. Physical examination revealed facet tenderness and facet loading is positive. We decided to try lumbar facet joint injection for diagnostic as well as for therapeutic purposes. The procedure and its risks were discussed with the patient and an informed consent was obtained. .Current Pain Assessment  Pain Assessment  Pain Assessment: 0-10  Pain Level: 6  Patient's Stated Pain Goal: 2(decrease pain and increase activity)  Pain Type: Chronic pain  Pain Location: Back  Pain Orientation: Left, Lower  Pain Radiating Towards: left leg  Pain Descriptors: Sharp, Shooting  Pain Frequency: Continuous  Pain Onset: On-going  Clinical Progression: Gradually worsening  Functional Pain Assessment: Prevents or interferes some active activities and ADLs  Non-Pharmaceutical Pain Intervention(s): Repositioned, Rest   Procedure:    Patient's vital signs including BP, EKG and SaO2 were monitored by RN and they remained stable during the procedure. A meaningful communication was kept up with the patient throughout   the procedure. The patient is placed in prone position. Skin over the back was prepped and draped in sterile manner. Under fluoroscopy the facet joints were identified and were palpated, and the following joints were found to be tender:  L1-L2, L2-L3, L3-L4, L4-L5, L5-S1 on the Left side. Hence we decided to inject these joints in the following way:  Using fluoroscopy the facet joints were identified and by adjusting the angle of the fluoroscopy, the view of the joint space was optimized. The skin and deep tissues over the joint space were anesthetized with 2 ml of 0.5% Marcaine.   The #22-gauge, 3-1/2 inch spinal needle was introduced through the skin wheal under fluoroscopoc guidance such that the tip of the needle lies in the joint space. This was confirmed by injecting about 0.5 ml of Omnipaque-180 through the needle and observing the spread of the contrast along the joint space. Then after negative aspiration a mixture of 0.5% Marcaine with triamcinolone was injected into the joint space. This was done at the levels of  L1-L2, L2-L3, L3-L4, L4-L5, L5-S1 on the Left side. A total of 80 mg of triamcinolone and 10 ml of 0.5% Marcaine was used and was divided in equal amounts among the joints. After removing the needles Band-Aids were placed over the needle insertion sites. Patient's vital signs remained stable and tolerated the procedure well. The patient was discharged home in stable condition and will be followed in the pain clinic in the next few weeks for further planning.     Electronically signed by Adonis Dwyer MD on 3/1/2021 at 9:18 AM

## 2021-03-01 NOTE — PROGRESS NOTES
Discharge criteria met. Patient alert and oriented x3  Post procedure dressing dry and intact. Sensory and motor function intact as per pre-procedure. Instructions and follow up reviewed with pt at patient at discharge. Patient discharged per wheelchair.  Gait steady @ [id number]  - Shannon Part and has help at home per patient if needed

## 2021-03-02 ENCOUNTER — TELEPHONE (OUTPATIENT)
Dept: PAIN MANAGEMENT | Age: 81
End: 2021-03-02

## 2021-03-16 ENCOUNTER — HOSPITAL ENCOUNTER (OUTPATIENT)
Dept: PAIN MANAGEMENT | Age: 81
Discharge: HOME | End: 2021-03-16
Payer: MEDICARE

## 2021-03-16 DIAGNOSIS — M54.16 LUMBAR RADICULOPATHY: ICD-10-CM

## 2021-03-16 DIAGNOSIS — M47.817 LUMBOSACRAL SPONDYLOSIS WITHOUT MYELOPATHY: Primary | ICD-10-CM

## 2021-03-16 PROCEDURE — 99441 PR PHYS/QHP TELEPHONE EVALUATION 5-10 MIN: CPT | Performed by: NURSE PRACTITIONER

## 2021-03-16 PROCEDURE — 99213 OFFICE O/P EST LOW 20 MIN: CPT

## 2021-03-16 ASSESSMENT — ENCOUNTER SYMPTOMS
BACK PAIN: 1
GASTROINTESTINAL NEGATIVE: 1
COUGH: 1

## 2021-03-16 NOTE — PROGRESS NOTES
[facility]aka 87 PROGRESS NOTE      Patient  completed []  video visit   [x]   phone call:    9  Minutes :       []    to  review Medication Agreement    [x]  Follow up after procedure   []  Discuss treatment options      Location:  Provider:  working from    [x]    home    []   [facility] ,   patient at   home    Chief Complaint: left leg pain    She had left lumbar facet injection on the left side L1-S1 on 3-1-2021. She obtained 75% relief. She feels her pain is manageable. The pain in her left leg has improved. She states she is not having nay pain in her back. She has had  No history of lumbar surgery. She sleeps well. She is back to walking. Back Pain  This is a chronic problem. The problem occurs intermittently. The problem has been gradually improving since onset. Pain location: left leg. Radiates to: left leg to  below knee. The pain is at a severity of 3/10. The pain is mild. Worse during: evening. The symptoms are aggravated by standing (walking). Risk factors include menopause. She has tried analgesics (rest) for the symptoms. Treatment goals:  Functional status: for pain to stay the same      Aberrancy:   Any alcoholic beverages  no          Any illegal drugs no       Analgesia:    3                 Adverse  Effects :  n/a    ADL;s :walks      Data:    n/a          Was the UDS appropriate:      Record/Diagnostics Review:      As above, I did review the imaging             History: Pain. Study: Low back pain    Technique: Multiplanar/multisequence images were obtained of the lumbar spine. No contrast was used. Findings: Position of the conus medullaris is within normal limits .     Vertebral body heights and alignments are normal. The marrow signal is normal.    L1-2 disc space is normal    L2-L3 disc space is normal    L3-L4 has facet joint and ligamentum flavum hypertrophy with broad-based disc bulging resulting in moderate canal narrowing.  There is mild left and moderate right foraminal narrowing     L4-L5has facet joint and ligamentum flavum hypertrophy with broad-based disc bulging results in moderate canal narrowing.  There is broad-based disc bulging resulting in bilateral inferior foraminal narrowing     L5-S1has facet joint arthropathy and ligamentum flavum hypertrophy without significant canal narrowing.  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 [id number]    Finalized by Di Jalloh MD on 8/18/2020 12:38 PM   Other Result Information   Interface - Rad Results/Orders In 1 - 08/18/2020 12:39 PM EDT        History: Pain. Study: Low back pain    Technique: Multiplanar/multisequence images were obtained of the lumbar spine. No contrast was used. Findings: Position of the conus medullaris is within normal limits . Vertebral body heights and alignments are normal. The marrow signal is normal.    L1-2 disc space is normal    L2-L3 disc space is normal    L3-L4 has facet joint and ligamentum flavum hypertrophy with broad-based disc bulging resulting in moderate canal narrowing. There is mild left and moderate right foraminal narrowing     L4-L5has facet joint and ligamentum flavum hypertrophy with broad-based disc bulging results in moderate canal narrowing. There is broad-based disc bulging resulting in bilateral inferior foraminal narrowing     L5-S1has facet joint arthropathy and ligamentum flavum hypertrophy without significant canal narrowing. 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           [id number]    Finalized by Di Jalloh MD on 8/18/2020 12:38 PM   Status                 Pill count: appropriate  fill date :n/a  Morphine equivalent dose as reported on OARRS:     Review ofOARRS does not show any aberrant prescription behavior. Medication is helping the patient stay active. Patient denies any side effects and reports adequate analgesia. No sign of misuse/abuse. Past Medical History:   Diagnosis Date    Arthritis     Diabetes mellitus ([address]     High cholesterol     Migraines     Vision abnormalities        Past Surgical History:   Procedure Laterality Date    APPENDECTOMY  1949    CHOLECYSTECTOMY  1971    COLONOSCOPY  10/12/07    HYSTERECTOMY  1983    Ovaries removed    ORIF HUMERUS DECOMPRESSION Left 2010    SALPINGO-OOPHORECTOMY  1983    TONSILLECTOMY AND ADENOIDECTOMY  1962       Allergies   Allergen Reactions    Aspirin      Intolerance. Upsets stomach    Duricef [Cefadroxil] Hives    Lipitor [Atorvastatin Calcium]      Muscle pain    Macrobid [Nitrofurantoin Monohyd Macro] Hives         Current Outpatient Medications:     guaiFENesin (MUCINEX) 600 MG extended release tablet, Take 1,200 mg by mouth 2 times daily, Disp: , Rfl:     carvedilol (COREG) 25 MG tablet, Take 25 mg by mouth 2 times daily (with meals), Disp: , Rfl:     benazepril (LOTENSIN) 10 MG tablet, Take 10 mg by mouth daily, Disp: , Rfl:     simvastatin (ZOCOR) 40 MG tablet, Take 40 mg by mouth nightly, Disp: , Rfl:     celecoxib (CELEBREX) 200 MG capsule, Take 200 mg by mouth daily. , Disp: , Rfl:     metFORMIN (GLUCOPHAGE) 500 MG tablet, Take 500 mg by mouth daily (with breakfast). , Disp: , Rfl:     omeprazole (PRILOSEC) 20 MG capsule, Take 20 mg by mouth daily. , Disp: , Rfl:     Multiple Vitamins-Minerals (PX SENIOR VITAMIN PO), Take  by mouth., Disp: , Rfl:     Calcium Carbonate-Vitamin D 600-400 MG-UNIT CHEW, Take  by mouth 2 times daily. , Disp: , Rfl:     tiZANidine (ZANAFLEX) 2 MG tablet, Take 1 tablet by mouth every 8 hours as needed (muscle spasms), Disp: 90 tablet, Rfl: 1    traMADol (ULTRAM) 50 MG tablet, Take 50 mg by mouth every 6 hours as needed for Pain., Disp: , Rfl:     Family History Problem Relation Age of Onset    Breast Cancer Paternal Cousin         >48 yo    Cancer Neg Hx     Colon Cancer Neg Hx     Diabetes Neg Hx     Eclampsia Neg Hx     Hypertension Neg Hx     Ovarian Cancer Neg Hx      Labor Neg Hx     Spont Abortions Neg Hx     Stroke Neg Hx        Social History     Socioeconomic History    Marital status:      Spouse name: Not on file    Number of children: Not on file    Years of education: Not on file    Highest education level: Not on file   Occupational History    Not on file   Social Needs    Financial resource strain: Not on file    Food insecurity     Worry: Not on file     Inability: Not on file    Transportation needs     Medical: Not on file     Non-medical: Not on file   Tobacco Use    Smoking status: Never Smoker    Smokeless tobacco: Never Used   Substance and Sexual Activity    Alcohol use: No     Alcohol/week: 0.0 standard drinks    Drug use: No    Sexual activity: Yes     Partners: Male     Birth control/protection: Surgical, Post-menopausal     Comment: hysterectomy BSO   Lifestyle    Physical activity     Days per week: Not on file     Minutes per session: Not on file    Stress: Not on file   Relationships    Social connections     Talks on phone: Not on file     Gets together: Not on file     Attends Pentecostalism service: Not on file     Active member of club or organization: Not on file     Attends meetings of clubs or organizations: Not on file     Relationship status: Not on file    Intimate partner violence     Fear of current or ex partner: Not on file     Emotionally abused: Not on file     Physically abused: Not on file     Forced sexual activity: Not on file   Other Topics Concern    Not on file   Social History Narrative    Not on file         Review of Systems:  Review of Systems   Constitution: Negative. HENT: Negative. Eyes:        Glasses   Cardiovascular: Negative. Respiratory: Positive for cough. Endocrine: Negative. Hematologic/Lymphatic: Negative. Skin: Negative. Musculoskeletal: Positive for back pain. Gastrointestinal: Negative. Genitourinary: Negative. Neurological: Negative. Psychiatric/Behavioral: Negative. Memory loss:          Physical Exam:  LMP  (LMP Unknown)     Physical Exam  Skin:         Neurological:      Mental Status: She is alert and oriented to person, place, and time. Psychiatric:         Mood and Affect: Mood normal.         Thought Content: Thought content normal.           Assessment:      Problem List Items Addressed This Visit     Lumbosacral spondylosis without myelopathy - Primary    Lumbar radiculopathy          Treatment Plan:  DISCUSSION: Treatment options discussed withpatient and all questions answered to patient's satisfaction.            1. Call if pain increases        TREATMENT OPTIONS:       See as needed

## 2021-03-19 RX ORDER — TIZANIDINE 2 MG/1
2 TABLET ORAL EVERY 8 HOURS PRN
Qty: 90 TABLET | Refills: 0 | Status: SHIPPED | OUTPATIENT
Start: 2021-03-19 | End: 2021-12-08 | Stop reason: SDUPTHER

## 2021-11-03 ENCOUNTER — HOSPITAL ENCOUNTER (OUTPATIENT)
Dept: PAIN MANAGEMENT | Age: 81
Discharge: HOME | End: 2021-11-03
Payer: MEDICARE

## 2021-11-03 DIAGNOSIS — M54.16 LUMBAR RADICULOPATHY: Primary | ICD-10-CM

## 2021-11-03 DIAGNOSIS — M47.816 LUMBAR FACET ARTHROPATHY: ICD-10-CM

## 2021-11-03 DIAGNOSIS — M19.90 ARTHRITIS: ICD-10-CM

## 2021-11-03 DIAGNOSIS — M47.817 LUMBOSACRAL SPONDYLOSIS WITHOUT MYELOPATHY: ICD-10-CM

## 2021-11-03 PROCEDURE — 99213 OFFICE O/P EST LOW 20 MIN: CPT

## 2021-11-03 PROCEDURE — 99441 PR PHYS/QHP TELEPHONE EVALUATION 5-10 MIN: CPT | Performed by: NURSE PRACTITIONER

## 2021-11-03 ASSESSMENT — ENCOUNTER SYMPTOMS
BACK PAIN: 1
GASTROINTESTINAL NEGATIVE: 1
RESPIRATORY NEGATIVE: 1
EYES NEGATIVE: 1

## 2021-11-03 NOTE — PROGRESS NOTES
[facility]aka 87 PROGRESS NOTE      Patient  completed []  video visit   [x]   phone call:   10      Minutes :       []    to  review Medication Agreement    []  Follow up after procedure   [x]  Discuss treatment options      Location:  Provider:  working from    [x]    home    []   [facility] ,   patient at home       Chief Complaint: low back pain    She c/o low back pain. She had left lumbar facet injection L1-s1, 3/2021  with 75%relief. She states it helped for 6 weeks and is starting to return. She states the pain goes down her left  leg. She had undergone a lumbar epidural injection 8/2020 with no relief. She would like to repeat left lumbar facet injection. She has no history of lumbar surgery. She states PT made her pain worse when she went through it. She reports her sleep is not good. 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 Back Pain  This is a chronic problem. The problem occurs constantly. The problem has been gradually worsening since onset. The pain is present in the lumbar spine. The quality of the pain is described as aching and burning. Radiates to: left leg. The pain is at a severity of 5/10. The pain is moderate. The pain is the same all the time. Exacerbated by: standing, walking. Associated symptoms include numbness. Bowel incontinence: feet. Risk factors include menopause. She has tried analgesics and heat (rest) for the symptoms. Treatment goals:  Functional status: get rid of pain    Aberrancy:   Any alcoholic beverages    no        Any illegal drugs   no    Analgesia:     5                Adverse  Effects :n/a    ADL;s :caregiver for , walks on treadmill    Data:    When was thelast UDS:        none    Was the UDS appropriate:  [] yes []   no      Record/Diagnostics Review:      As above, I did review the imaging       MR lumbar without contrast    Narrative          History: Pain.      Study: Low back pain     Technique: Multiplanar/multisequence arthropathy and ligamentum flavum hypertrophy without significant canal narrowing.  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 [id number]     Finalized by Brando Paz MD on 8/18/2020 12:38 PM     Date: 08/18/20   Received From: [facility]                  Pill count: appropriate    fill date : n/a  Morphine equivalent dose as reported on OARRS:     Review ofOARRS does not show any aberrant prescription behavior. Medication is helping the patient stay active. Patient denies any side effects and reports adequate analgesia. No sign of misuse/abuse. Past Medical History:   Diagnosis Date    Arthritis     Diabetes mellitus ([address]     High cholesterol     Migraines     Vision abnormalities        Past Surgical History:   Procedure Laterality Date    APPENDECTOMY  1949    CHOLECYSTECTOMY  1971    COLONOSCOPY  10/12/07    HYSTERECTOMY  1983    Ovaries removed    ORIF HUMERUS DECOMPRESSION Left 2010    SALPINGO-OOPHORECTOMY  1983    TONSILLECTOMY AND ADENOIDECTOMY  1962       Allergies   Allergen Reactions    Aspirin      Intolerance. Upsets stomach    Duricef [Cefadroxil] Hives    Lipitor [Atorvastatin Calcium]      Muscle pain    Macrobid [Nitrofurantoin Monohyd Macro] Hives         Current Outpatient Medications:     Multiple Vitamins-Minerals (ICAPS AREDS 2 PO), Take by mouth, Disp: , Rfl:     carvedilol (COREG) 25 MG tablet, Take 25 mg by mouth 2 times daily (with meals), Disp: , Rfl:     benazepril (LOTENSIN) 10 MG tablet, Take 10 mg by mouth daily, Disp: , Rfl:     simvastatin (ZOCOR) 40 MG tablet, Take 40 mg by mouth nightly, Disp: , Rfl:     celecoxib (CELEBREX) 200 MG capsule, Take 200 mg by mouth daily. , Disp: , Rfl:     metFORMIN (GLUCOPHAGE) 500 MG tablet, Take 500 mg by mouth daily (with breakfast). , Disp: , Rfl:     omeprazole (PRILOSEC) 20 MG capsule, Take 20 mg by mouth daily. , Disp: , Rfl:     Multiple Vitamins-Minerals (PX SENIOR VITAMIN PO), Take  by mouth., Disp: , Rfl:     Calcium Carbonate-Vitamin D 600-400 MG-UNIT CHEW, Take  by mouth 2 times daily. , Disp: , Rfl:     tiZANidine (ZANAFLEX) 2 MG tablet, Take 1 tablet by mouth every 8 hours as needed (muscle spasms), Disp: 90 tablet, Rfl: 0    guaiFENesin (MUCINEX) 600 MG extended release tablet, Take 1,200 mg by mouth 2 times daily, Disp: , Rfl:     traMADol (ULTRAM) 50 MG tablet, Take 50 mg by mouth every 6 hours as needed for Pain., Disp: , Rfl:     Family History   Problem Relation Age of Onset    Breast Cancer Paternal Cousin         >48 yo    Cancer Neg Hx     Colon Cancer Neg Hx     Diabetes Neg Hx     Eclampsia Neg Hx     Hypertension Neg Hx     Ovarian Cancer Neg Hx      Labor Neg Hx     Spont Abortions Neg Hx     Stroke Neg Hx        Social History     Socioeconomic History    Marital status:      Spouse name: Not on file    Number of children: Not on file    Years of education: Not on file    Highest education level: Not on file   Occupational History    Not on file   Tobacco Use    Smoking status: Never Smoker    Smokeless tobacco: Never Used   Vaping Use    Vaping Use: Never used   Substance and Sexual Activity    Alcohol use: No     Alcohol/week: 0.0 standard drinks    Drug use: No    Sexual activity: Yes     Partners: Male     Birth control/protection: Surgical, Post-menopausal     Comment: hysterectomy BSO   Other Topics Concern    Not on file   Social History Narrative    Not on file     Social Determinants of Health     Financial Resource Strain:     Difficulty of Paying Living Expenses:    Food Insecurity:     Worried About Running Out of Food in the Last Year:     Ran Out of Food in the Last Year:    Transportation Needs:     Lack of Transportation (Medical):      Lack of Transportation (Non-Medical):    Physical Activity:     Days of Exercise per Week:     Minutes of Exercise per Session:    Stress:     Feeling of Stress :    Social Connections:     Frequency of Communication with Friends and Family:     Frequency of Social Gatherings with Friends and Family:     Attends Jainism Services:     Active Member of Clubs or Organizations:     Attends Club or Organization Meetings:     Marital Status:    Intimate Partner Violence:     Fear of Current or Ex-Partner:     Emotionally Abused:     Physically Abused:     Sexually Abused:          Review of Systems:  Review of Systems   Constitutional: Negative. HENT: Negative. Eyes: Negative. Cardiovascular: Negative. Respiratory: Negative. Endocrine:        Diabetic; blood sugar  Below 100   Hematologic/Lymphatic: Negative. Skin: Negative. Musculoskeletal: Positive for back pain and joint pain. Gastrointestinal: Negative. Bowel incontinence: feet. Genitourinary: Positive for nocturia. Neurological: Positive for numbness. Psychiatric/Behavioral: Negative. Physical Exam:  LMP  (LMP Unknown)     Physical Exam  Skin:         Neurological:      Mental Status: She is alert and oriented to person, place, and time. Psychiatric:         Mood and Affect: Mood normal.         Thought Content: Thought content normal.           Assessment:      Problem List Items Addressed This Visit     Lumbosacral spondylosis without myelopathy    Relevant Orders    FLUORO FOR SURGICAL PROCEDURES    Lumbar radiculopathy - Primary    Arthritis      Other Visit Diagnoses     Lumbar facet arthropathy        Relevant Orders    Saline lock IV    FLUORO FOR SURGICAL PROCEDURES    KS INJ DX/THER AGNT PARAVERT FACET JOINT, LUMBAR/SAC, 1ST LEVEL            Treatment Plan:  DISCUSSION: Treatment options discussed withpatient and all questions answered to patient's satisfaction.      Possible side effects, risk of tolerance and or dependence and alternative treatments discussed    Obtaining appropriate analgesic effect of treatment   No signs of potential drug abuse or diversion identified    [x] Ill effects of being on chronic pain medications such as sleep disturbances, respiratory depression, hormonal changes, withdrawal symptoms, chronic opioid dependence and tolerance as well as risk of taking opioids with Benzodiazepines and taking opioids along with alcohol,  werediscussed with patient. I had asked the patient to minimize medication use and utilize pain medications only for uncontrolled rest pain or pain with exertional activities. I advised patient not to self-escalate painmedications without consulting with us. At each of patient's future visits we will try to taper pain medications, while adjusting the adjunct medications, and re-evaluating for Physical Therapy to improve spinal andjoint strength. We will continue to have discussions to decrease pain medications as tolerated. Counseled patient on effects their pain medication and /or their medical condition mayhave on their  ability to drive or operate machinery. Instructed not to drive or operate machinery if drowsy     I also discussed with the patient regarding the dangers of combining narcotic pain medication with tranquilizers, alcohol or illegal drugs or taking the medication any way other than prescribed. The dangers were discussed  including respiratory depression and death. Patient was told to tell  all  physicians regarding the medications he is getting from pain clinic. Patient is warned not to take any unprescribed medications over-the-countermedications that can depress breathing . Patient is advised to talk to the pharmacist or physicians if planning to take any over-the-counter medications before  takeing them.  Patient is strongly advised to avoid tranquilizers or  relaxants, illegal drugs  or any medications that can depress breathing  Patient is also advised to tell us if there is any changes in their medications from other physicians. The patient was counseled about the risks of mahnaz Covid-19 during their procedure period and any recovery window from their procedure. The patient was made aware that mahnaz Covid-19 may worsen their prognosis for recovering from their procedure and lend to a higher morbidity and/or mortality risk. All material risks, benefits, and reasonable alternatives including postponing the procedure were discussed. The patient (DOES  t wish) to proceed with their procedure at this time.     1.  Schedule left lumbar facet injection L1-S1, recived 75% relief with last injection, pain returning, interferes with sleep, given pre-procedure instructions    TREATMENT OPTIONS:       Left lumbar facet injection  Follow up appointment made

## 2021-11-04 ENCOUNTER — TELEPHONE (OUTPATIENT)
Dept: PAIN MANAGEMENT | Age: 81
End: 2021-11-04

## 2021-11-04 NOTE — TELEPHONE ENCOUNTER
I spoke with the patient to schedule injection. She states she is on her way out the door for a Doctor's appointment with her . She will call me back.

## 2021-11-18 NOTE — PRE-PROCEDURE INSTRUCTIONS
[address] [facility]. Pt. Called to review pre-procedure instructions. VM obtained, and VM apt. Reminder left for pt. Pt. Instructed to wear mask on entrance into the hospital, and discussed which entrances are open for pts. Pt. Encouraged to call [facility] with questions or concerns.

## 2021-11-22 ENCOUNTER — HOSPITAL ENCOUNTER (OUTPATIENT)
Dept: GENERAL RADIOLOGY | Age: 81
Discharge: HOME | End: 2021-11-24
Payer: MEDICARE

## 2021-11-22 ENCOUNTER — HOSPITAL ENCOUNTER (OUTPATIENT)
Dept: PAIN MANAGEMENT | Age: 81
Discharge: HOME | End: 2021-11-22
Payer: MEDICARE

## 2021-11-22 VITALS
TEMPERATURE: 97.7 F | BODY MASS INDEX: 27.42 KG/M2 | OXYGEN SATURATION: 99 % | HEIGHT: 62 IN | SYSTOLIC BLOOD PRESSURE: 148 MMHG | DIASTOLIC BLOOD PRESSURE: 58 MMHG | WEIGHT: 149 LBS | HEART RATE: 62 BPM | RESPIRATION RATE: 16 BRPM

## 2021-11-22 DIAGNOSIS — M47.816 LUMBAR FACET ARTHROPATHY: ICD-10-CM

## 2021-11-22 DIAGNOSIS — M51.36 DDD (DEGENERATIVE DISC DISEASE), LUMBAR: ICD-10-CM

## 2021-11-22 DIAGNOSIS — M47.817 LUMBOSACRAL SPONDYLOSIS WITHOUT MYELOPATHY: Primary | ICD-10-CM

## 2021-11-22 DIAGNOSIS — M47.817 LUMBOSACRAL SPONDYLOSIS WITHOUT MYELOPATHY: ICD-10-CM

## 2021-11-22 PROCEDURE — 64494 INJ PARAVERT F JNT L/S 2 LEV: CPT | Performed by: PAIN MEDICINE

## 2021-11-22 PROCEDURE — 64493 INJ PARAVERT F JNT L/S 1 LEV: CPT

## 2021-11-22 PROCEDURE — 64493 INJ PARAVERT F JNT L/S 1 LEV: CPT | Performed by: PAIN MEDICINE

## 2021-11-22 PROCEDURE — 64495 INJ PARAVERT F JNT L/S 3 LEV: CPT

## 2021-11-22 PROCEDURE — 64495 INJ PARAVERT F JNT L/S 3 LEV: CPT | Performed by: PAIN MEDICINE

## 2021-11-22 PROCEDURE — 64494 INJ PARAVERT F JNT L/S 2 LEV: CPT

## 2021-11-22 PROCEDURE — 3209999900 FLUORO FOR SURGICAL PROCEDURES

## 2021-11-22 PROCEDURE — 6360000004 HC RX CONTRAST MEDICATION: Performed by: PAIN MEDICINE

## 2021-11-22 PROCEDURE — 6360000002 HC RX W HCPCS: Performed by: PAIN MEDICINE

## 2021-11-22 PROCEDURE — 2500000003 HC RX 250 WO HCPCS: Performed by: PAIN MEDICINE

## 2021-11-22 RX ORDER — TRIAMCINOLONE ACETONIDE 40 MG/ML
INJECTION, SUSPENSION INTRA-ARTICULAR; INTRAMUSCULAR
Status: COMPLETED | OUTPATIENT
Start: 2021-11-22 | End: 2021-11-22

## 2021-11-22 RX ORDER — BUPIVACAINE HYDROCHLORIDE 5 MG/ML
INJECTION, SOLUTION EPIDURAL; INTRACAUDAL
Status: COMPLETED | OUTPATIENT
Start: 2021-11-22 | End: 2021-11-22

## 2021-11-22 RX ADMIN — IOHEXOL 3 ML: 180 INJECTION INTRAVENOUS at 09:59

## 2021-11-22 RX ADMIN — TRIAMCINOLONE ACETONIDE 80 MG: 40 INJECTION, SUSPENSION INTRA-ARTICULAR; INTRAMUSCULAR at 09:58

## 2021-11-22 RX ADMIN — BUPIVACAINE HYDROCHLORIDE 20 ML: 5 INJECTION, SOLUTION EPIDURAL; INTRACAUDAL; PERINEURAL at 09:59

## 2021-11-22 ASSESSMENT — PAIN DESCRIPTION - ONSET: ONSET: ON-GOING

## 2021-11-22 ASSESSMENT — PAIN DESCRIPTION - DESCRIPTORS: DESCRIPTORS: SHARP;SHOOTING

## 2021-11-22 ASSESSMENT — PAIN DESCRIPTION - PROGRESSION: CLINICAL_PROGRESSION: GRADUALLY WORSENING

## 2021-11-22 ASSESSMENT — PAIN DESCRIPTION - LOCATION: LOCATION: BACK

## 2021-11-22 ASSESSMENT — PAIN DESCRIPTION - ORIENTATION: ORIENTATION: LEFT;LOWER

## 2021-11-22 ASSESSMENT — PAIN DESCRIPTION - FREQUENCY: FREQUENCY: CONTINUOUS

## 2021-11-22 ASSESSMENT — PAIN DESCRIPTION - PAIN TYPE: TYPE: CHRONIC PAIN

## 2021-11-22 ASSESSMENT — PAIN SCALES - GENERAL: PAINLEVEL_OUTOF10: 5

## 2021-11-22 NOTE — LETTER
Letter of Medical Necessity    12/7/2021    Insurance Company:   [employer]  Reference number: [id number]    RE: Kaylin Mendiola  [facility] [address]       [facility]: [id number]    [facility]: [phone]     1940      Ladies and Gentlemen    This letter is being provided to support the medical necessity of Cherise Marrero's treatment in the pain care clinic at [address] [place] [address] has the diagnosis of    1. Lumbosacral spondylosis without myelopathy    2. Lumbar facet arthropathy    3. DDD (degenerative disc disease), lumbar      and has failed the following conservative treatment:   Physical examination on this patient did revealed facet tenderness and facet loading was positive. Since this patient had undergone Lumbar facet joint injections at the levels of L1-L2, L2-L3, L3-L4, L4-L5, L5-S1 on the Left side with fluoroscopy guidance on 11/22/2021. A similar procedure was done on 3/1/2021. Post procedurally patient got about 75 to 80% pain relief. She is able to increase her activity levels. Patient apparently got similar results from this procedure. The other alternate treatment for this patient is to be on chronic narcotic medications which will be at her age are  associated with her increased side effects including increased incidence of fall and fracture of the bones which will have a devastating effect on the patient's health. NSAIDs are also a problem in this patient because of poor effects on the renal function as well as increased risk of GI bleed    Consequently, a Treatment with a CPT code of 59897 and 94176 is now medically necessary -  I requests insurance company to please allow these codes and benefit the patient.   Should you have any questions or concerns, please feel free to contact my office at [phone]    Sincerely      Reanna Cornell MD

## 2021-11-22 NOTE — PROCEDURES
Pre-Procedure Note    Patient Name: Vanessa Parker   YOB: 1940  Room/Bed: Room/bed info not found  Medical Record Number: [id number]  Date: 11/22/2021       Indication:    1. Lumbosacral spondylosis without myelopathy    2. Lumbar facet arthropathy    3. DDD (degenerative disc disease), lumbar        Consent: On file. Vital Signs:   Vitals:    11/22/21 0954   BP: (!) 158/58   Pulse: 59   Resp: 15   Temp:    SpO2: 99%       Past Medical History:   has a past medical history of Arthritis, Diabetes mellitus ([address] High cholesterol, Migraines, and Vision abnormalities. Past Surgical History:   has a past surgical history that includes Colonoscopy (10/12/07); Tonsillectomy and adenoidectomy (1962); Appendectomy (1949); Cholecystectomy (1971); Salpingo-oophorectomy (1983); orif humerus decompression (Left, 2010); and Hysterectomy (1983). Pre-Sedation Documentation and Exam:   Vital signs have been reviewed (see flow sheet for vitals). Mallampati Airway Assessment:  normal    ASA Classification:  Class 3 - A patient with severe systemic disease that limits activity but is not incapacitating    Sedation/ Anesthesia Plan:   intravenous sedation   as needed    Medications Planned:   midazolam (Versed) / Fentanyl  Intravenously  as needed. Patient is an appropriate candidate for plan of sedation: yes  Patient's History and Physical examination was reviewed and there is no change. Electronically signed by Yazan Cook MD on 11/22/2021 at 10:05 AM        Preoperative Diagnosis:    1. Lumbosacral spondylosis without myelopathy    2. Lumbar facet arthropathy    3. DDD (degenerative disc disease), lumbar        Postoperative Diagnosis:   1. Lumbosacral spondylosis without myelopathy    2. Lumbar facet arthropathy    3.  DDD (degenerative disc disease), lumbar        Procedure Performed[de-identified]  Lumbar facet joint injections at the levels of L1-L2, L2-L3, L3-L4, L4-L5, L5-S1 on the Left side with fluoroscopy guidance, without IV sedation. Indication for the Procedure: The patient had history of chronic low back pain which is not responding well to the conservative treatment. The patient's pain is mostly axial in nature. Pain is interfering with the activities of daily living. Physical examination revealed facet tenderness and facet loading is positive. We decided to try lumbar facet joint injection for diagnostic as well as for therapeutic purposes. The procedure and its risks were discussed with the patient and an informed consent was obtained. .Current Pain Assessment  Pain Assessment  Pain Assessment: 0-10  Pain Level: 5  Patient's Stated Pain Goal: 2 (increase activity, decrease pain)  Pain Type: Chronic pain  Pain Location: Back  Pain Orientation: Left, Lower  Pain Radiating Towards: left leg to shin  Pain Descriptors: Andra Sinning, Shooting  Pain Frequency: Continuous  Pain Onset: On-going  Clinical Progression: Gradually worsening  Functional Pain Assessment: Prevents or interferes some active activities and ADLs  Non-Pharmaceutical Pain Intervention(s): Rest, Repositioned  Response to Pain Intervention:  (3-2021 Lumbar facet joint injection left with 75% improvement)  POSS Score (Patient Ctrl Analgesia): 1   Procedure:     Patient's vital signs including BP, EKG and SaO2 were monitored by RN and they remained stable during the procedure. A meaningful communication was kept up with the patient throughout   the procedure. The patient is placed in prone position. Skin over the back was prepped and draped in sterile manner. Under fluoroscopy the facet joints were identified and were palpated, and the following joints were found to be tender: L1-L2, L2-L3, L3-L4, L4-L5, L5-S1 on the Left side. Hence we decided to inject these joints in the following way:  Using fluoroscopy the facet joints were identified and by adjusting the angle of the fluoroscopy, the view of the joint space was optimized. The skin and deep tissues over the joint space were anesthetized with 2 ml of 0.5% Marcaine. The #22-gauge, 3-1/2 inch spinal needle was introduced through the skin wheal under fluoroscopoc guidance such that the tip of the needle lies in the joint space. This was confirmed by injecting about 0.5 ml of Omnipaque-180 through the needle and observing the spread of the contrast along the joint space. Then after negative aspiration a mixture of 0.5% Marcaine with triamcinolone was injected into the joint space. This was done at the levels of  L1-L2, L2-L3, L3-L4, L4-L5, L5-S1 on the Left side. A total of 80 mg of triamcinolone and 10 ml of 0.5% Marcaine was used and was divided in equal amounts among the joints. After removing the needles Band-Aids were placed over the needle insertion sites. Patient's vital signs remained stable and tolerated the procedure well. The patient was discharged home in stable condition and will be followed in the pain clinic in the next few weeks for further planning.     Electronically signed by Daniella Duque MD on 11/22/2021 at 10:05 AM

## 2021-11-22 NOTE — PROGRESS NOTES
Discharge criteria met. Patient alert and oriented x3  Post procedure dressing dry and intact. Sensory and motor function intact as per pre-procedure. Instructions and follow up reviewed with pt at patient at discharge. Patient discharged per wheelchair, gait steady, - Kp Rodriguez @ 1601.  Has help at home per patient if needed

## 2021-12-08 ENCOUNTER — HOSPITAL ENCOUNTER (OUTPATIENT)
Dept: PAIN MANAGEMENT | Age: 81
Discharge: HOME | End: 2021-12-08
Payer: MEDICARE

## 2021-12-08 DIAGNOSIS — M47.817 LUMBOSACRAL SPONDYLOSIS WITHOUT MYELOPATHY: ICD-10-CM

## 2021-12-08 DIAGNOSIS — M54.16 LUMBAR RADICULOPATHY: Primary | ICD-10-CM

## 2021-12-08 PROCEDURE — 99441 PR PHYS/QHP TELEPHONE EVALUATION 5-10 MIN: CPT | Performed by: NURSE PRACTITIONER

## 2021-12-08 PROCEDURE — 99213 OFFICE O/P EST LOW 20 MIN: CPT

## 2021-12-08 RX ORDER — TIZANIDINE 2 MG/1
2 TABLET ORAL EVERY 8 HOURS PRN
Qty: 90 TABLET | Refills: 0 | Status: SHIPPED | OUTPATIENT
Start: 2021-12-08

## 2021-12-08 ASSESSMENT — ENCOUNTER SYMPTOMS
BACK PAIN: 1
GASTROINTESTINAL NEGATIVE: 1
RESPIRATORY NEGATIVE: 1
EYES NEGATIVE: 1

## 2021-12-08 NOTE — PROGRESS NOTES
[address] [facility] PROGRESS NOTE      Patient  completed []  video visit   [x]   phone call:    10     Minutes :       []    to  review Medication Agreement    [x]  Follow up after procedure   []  Discuss treatment options      Location:  Provider:  working from    [x]    home    []   [facility] ,   patient at home       Chief Complaint: low back pain      She had left lumbar facet injection L1-S1 on 11- with 80% relief. She gets cramps in her legs at night, she is out of tizanidine. She states is able to be more active. She has no history of lumbar surgery,. She states blood sugar is back to normal it was elevated  to 113 the day   after her procedure and it was 96 yesterday. She is able to perform her household tasks. She completed PT in the past and it did not help      Back Pain  This is a chronic problem. The problem occurs intermittently. The problem has been gradually improving since onset. The pain is present in the lumbar spine (left side). Quality: sharp. Radiates to: left leg to calf. The pain is at a severity of 3/10. The pain is the same all the time. Exacerbated by: standing, walking. (Cramps in legs) She has tried analgesics for the symptoms. Treatment goals:  Functional status: keep pain 2-3      Aberrancy:   Any alcoholic beverages     no       Any illegal drugs   no      Analgesia:   3                  Adverse  Effects :  n/a       ADL;s : household tasks    Data:    When was thelast UDS:    n/a        Was the UDS appropriate:  [] yes []   no      Record/Diagnostics Review:      As above, I did review the imaging        [facility]  Outside Information        MR lumbar without contrast      Narrative          History: Pain. Study: Low back pain     Technique: Multiplanar/multisequence images were obtained of the lumbar spine. No contrast was used. Findings: Position of the conus medullaris is within normal limits .      Vertebral body heights and alignments are normal. The marrow signal is normal.     L1-2 disc space is normal     L2-L3 disc space is normal     L3-L4 has facet joint and ligamentum flavum hypertrophy with broad-based disc bulging resulting in moderate canal narrowing.  There is mild left and moderate right foraminal narrowing      L4-L5has facet joint and ligamentum flavum hypertrophy with broad-based disc bulging results in moderate canal narrowing.  There is broad-based disc bulging resulting in bilateral inferior foraminal narrowing      L5-S1has facet joint arthropathy and ligamentum flavum hypertrophy without significant canal narrowing.  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 [id number]     Finalized by Regino Espinoza MD on 8/18/2020 12:38 PM    Other Result Text    Twan Garcia MD - 08/18/2020   Formatting of this note might be different from the original.         History: Pain. Study: Low back pain     Technique: Multiplanar/multisequence images were obtained of the lumbar spine. No contrast was used. Findings: Position of the conus medullaris is within normal limits .      Vertebral body heights and alignments are normal. The marrow signal is normal.     L1-2 disc space is normal     L2-L3 disc space is normal     L3-L4 has facet joint and ligamentum flavum hypertrophy with broad-based disc bulging resulting in moderate canal narrowing.  There is mild left and moderate right foraminal narrowing      L4-L5has facet joint and ligamentum flavum hypertrophy with broad-based disc bulging results in moderate canal narrowing.  There is broad-based disc bulging resulting in bilateral inferior foraminal narrowing      L5-S1has facet joint arthropathy and ligamentum flavum hypertrophy without significant canal narrowing.  There is broad-based disc bulging causing left foraminal stenosis Impression:   Multilevel degenerative change with left foraminal stenosis L5-S1. [id number]     Finalized by Regino Espinoza MD on 8/18/2020 12:38 PM  Specimen Collected:    Date: 08/18/20   Received From: [facility]                       Pill count: appropriate    fill date :  n/a  Morphine equivalent dose as reported on OARRS:     Review ofOARRS does not show any aberrant prescription behavior. Medication is helping the patient stay active. Patient denies any side effects and reports adequate analgesia. No sign of misuse/abuse. Past Medical History:   Diagnosis Date    Arthritis     Diabetes mellitus ([facility] [address]     High cholesterol     Migraines     Vision abnormalities        Past Surgical History:   Procedure Laterality Date    APPENDECTOMY  1949    CHOLECYSTECTOMY  1971    COLONOSCOPY  10/12/07    HYSTERECTOMY  1983    Ovaries removed    ORIF HUMERUS DECOMPRESSION Left 2010    SALPINGO-OOPHORECTOMY  1983    TONSILLECTOMY AND ADENOIDECTOMY  1962       Allergies   Allergen Reactions    Aspirin      Intolerance.  Upsets stomach    Duricef [Cefadroxil] Hives    Lipitor [Atorvastatin Calcium]      Muscle pain    Macrobid [Nitrofurantoin Monohyd Macro] Hives         Current Outpatient Medications:     Multiple Vitamins-Minerals (ICAPS AREDS 2 PO), Take by mouth, Disp: , Rfl:     tiZANidine (ZANAFLEX) 2 MG tablet, Take 1 tablet by mouth every 8 hours as needed (muscle spasms), Disp: 90 tablet, Rfl: 0    guaiFENesin (MUCINEX) 600 MG extended release tablet, Take 1,200 mg by mouth 2 times daily, Disp: , Rfl:     carvedilol (COREG) 25 MG tablet, Take 25 mg by mouth 2 times daily (with meals), Disp: , Rfl:     benazepril (LOTENSIN) 10 MG tablet, Take 10 mg by mouth daily, Disp: , Rfl:     traMADol (ULTRAM) 50 MG tablet, Take 50 mg by mouth every 6 hours as needed for Pain., Disp: , Rfl:     simvastatin (ZOCOR) 40 MG tablet, Take 40 mg by mouth nightly, Disp: , Rfl:     celecoxib (CELEBREX) 200 MG capsule, Take 200 mg by mouth daily. , Disp: , Rfl:     metFORMIN (GLUCOPHAGE) 500 MG tablet, Take 500 mg by mouth daily (with breakfast). , Disp: , Rfl:     omeprazole (PRILOSEC) 20 MG capsule, Take 20 mg by mouth daily. , Disp: , Rfl:     Multiple Vitamins-Minerals (PX SENIOR VITAMIN PO), Take  by mouth., Disp: , Rfl:     Calcium Carbonate-Vitamin D 600-400 MG-UNIT CHEW, Take  by mouth 2 times daily. , Disp: , Rfl:     Family History   Problem Relation Age of Onset    Breast Cancer Paternal Cousin         >46 yo    Cancer Neg Hx     Colon Cancer Neg Hx     Diabetes Neg Hx     Eclampsia Neg Hx     Hypertension Neg Hx     Ovarian Cancer Neg Hx      Labor Neg Hx     Spont Abortions Neg Hx     Stroke Neg Hx        Social History     Socioeconomic History    Marital status:      Spouse name: Not on file    Number of children: Not on file    Years of education: Not on file    Highest education level: Not on file   Occupational History    Not on file   Tobacco Use    Smoking status: Never Smoker    Smokeless tobacco: Never Used   Vaping Use    Vaping Use: Never used   Substance and Sexual Activity    Alcohol use: No     Alcohol/week: 0.0 standard drinks    Drug use: No    Sexual activity: Yes     Partners: Male     Birth control/protection: Surgical, Post-menopausal     Comment: hysterectomy BSO   Other Topics Concern    Not on file   Social History Narrative    Not on file     Social Determinants of Health     Financial Resource Strain:     Difficulty of Paying Living Expenses: Not on file   Food Insecurity:     Worried About Running Out of Food in the Last Year: Not on file    Olga of Food in the Last Year: Not on file   Transportation Needs:     Lack of Transportation (Medical): Not on file    Lack of Transportation (Non-Medical):  Not on file   Physical Activity:     Days of Exercise per Week: Not on file    Minutes of Exercise per Session: Not on file   Stress:     Feeling of Stress : Not on file   Social Connections:     Frequency of Communication with Friends and Family: Not on file    Frequency of Social Gatherings with Friends and Family: Not on file    Attends [employer] Services: Not on file    Active Member of Clubs or Organizations: Not on file    Attends Club or Organization Meetings: Not on file    Marital Status: Not on file   Intimate Partner Violence:     Fear of Current or Ex-Partner: Not on file    Emotionally Abused: Not on file    Physically Abused: Not on file    Sexually Abused: Not on file   Housing Stability:     Unable to Pay for Housing in the Last Year: Not on file    Number of [place] in the Last Year: Not on file    Unstable Housing in the Last Year: Not on file         Review of Systems:  Review of Systems   Constitutional: Negative. HENT: Negative. Eyes: Negative. Cardiovascular: Negative. Respiratory: Negative. Endocrine:        Blood sugar 96   Hematologic/Lymphatic: Bruises/bleeds easily. Skin: Negative. Musculoskeletal: Positive for back pain. Gastrointestinal: Negative. Genitourinary: Positive for nocturia. Neurological: Negative. Psychiatric/Behavioral: Negative. Physical Exam:  LMP  (LMP Unknown)     Physical Exam  Skin:         Neurological:      Mental Status: She is alert and oriented to person, place, and time. Psychiatric:         Mood and Affect: Mood normal.         Thought Content: Thought content normal.           Assessment:      Problem List Items Addressed This Visit     Lumbosacral spondylosis without myelopathy    Lumbar radiculopathy - Primary          Treatment Plan:  DISCUSSION: Treatment options discussed withpatient and all questions answered to patient's satisfaction.      Possible side effects, risk of tolerance and or dependence and alternative treatments discussed    Obtaining appropriate analgesic effect of treatment   No signs of potential drug abuse or diversion identified    [x] Ill effects of being on chronic pain medications such as sleep disturbances, respiratory depression, hormonal changes, withdrawal symptoms, chronic opioid dependence and tolerance as well as risk of taking opioids with Benzodiazepines and taking opioids along with alcohol,  werediscussed with patient. I had asked the patient to minimize medication use and utilize pain medications only for uncontrolled rest pain or pain with exertional activities. I advised patient not to self-escalate painmedications without consulting with us. At each of patient's future visits we will try to taper pain medications, while adjusting the adjunct medications, and re-evaluating for Physical Therapy to improve spinal andjoint strength. We will continue to have discussions to decrease pain medications as tolerated. Counseled patient on effects their pain medication and /or their medical condition mayhave on their  ability to drive or operate machinery. Instructed not to drive or operate machinery if drowsy     I also discussed with the patient regarding the dangers of combining narcotic pain medication with tranquilizers, alcohol or illegal drugs or taking the medication any way other than prescribed. The dangers were discussed  including respiratory depression and death. Patient was told to tell  all  physicians regarding the medications he is getting from pain clinic. Patient is warned not to take any unprescribed medications over-the-countermedications that can depress breathing . Patient is advised to talk to the pharmacist or physicians if planning to take any over-the-counter medications before  takeing them. Patient is strongly advised to avoid tranquilizers or  relaxants, illegal drugs  or any medications that can depress breathing  Patient is also advised to tell us if there is any changes in their medications from other physicians.       1. Call if pain increases  2, will renew tizanidine for leg cramps    TREATMENT OPTIONS:     Tizanidine 2mg  See as needed

## 2022-04-05 ENCOUNTER — HOSPITAL ENCOUNTER (OUTPATIENT)
Dept: PAIN MANAGEMENT | Age: 82
Discharge: HOME | End: 2022-04-05
Payer: MEDICARE

## 2022-04-05 VITALS
DIASTOLIC BLOOD PRESSURE: 47 MMHG | SYSTOLIC BLOOD PRESSURE: 124 MMHG | BODY MASS INDEX: 25.95 KG/M2 | OXYGEN SATURATION: 97 % | TEMPERATURE: 97.1 F | WEIGHT: 141 LBS | HEART RATE: 60 BPM | HEIGHT: 62 IN

## 2022-04-05 DIAGNOSIS — M48.062 SPINAL STENOSIS OF LUMBAR REGION WITH NEUROGENIC CLAUDICATION: Chronic | ICD-10-CM

## 2022-04-05 DIAGNOSIS — M47.817 LUMBOSACRAL SPONDYLOSIS WITHOUT MYELOPATHY: ICD-10-CM

## 2022-04-05 DIAGNOSIS — M47.812 CERVICAL SPONDYLOSIS WITHOUT MYELOPATHY: Primary | Chronic | ICD-10-CM

## 2022-04-05 PROCEDURE — 99215 OFFICE O/P EST HI 40 MIN: CPT | Performed by: ANESTHESIOLOGY

## 2022-04-05 PROCEDURE — 99213 OFFICE O/P EST LOW 20 MIN: CPT

## 2022-04-05 ASSESSMENT — ENCOUNTER SYMPTOMS
SHORTNESS OF BREATH: 0
DIARRHEA: 0
COUGH: 0
BACK PAIN: 1
WHEEZING: 0
NAUSEA: 0
SORE THROAT: 0
VOMITING: 0
CONSTIPATION: 0

## 2022-04-05 NOTE — PROGRESS NOTES
The patient is a 78 y. o. Non- / non  female.     Chief Complaint   Patient presents with    Neck Pain    Leg Pain     L Leg    Back Pain        HPI  Pleasant 68-year-old female with past medical history significant for diabetes  Seen in our clinic today for neck and back pain    Main issue today is neck pain  Pain is chronic flared up for last several months located predominantly on the right side of the neck associated with occipital headache  No radiation in arm  No associated numbness or paresthesia  No changes in bladder or bowel control  No previous cervical spine injection or surgical history  Had recent diagnostic work-up with x-ray cervical spine that showed multilevel cervical facet arthropathy  Have tried physical therapy  Currently taking Tylenol and tramadol find it helpful  Reports no side effect  NSAIDs contraindicated because of advanced age    Back pain  Pain is chronic onset many years ago located in the lower lumbar area with radiation down both legs left more than right  Aggravated the standing and walking and alleviated with sitting and rest  Associated with intermittent left leg numbness and heaviness  Denies any loss of bladder or bowel control  No previous lumbar spine surgical history  Had MRI lumbar spine in 2020 that showed moderate spinal stenosis at L3-4 and L4-5 level  Had lumbar facet injection in past with limited benefit  Have tried physical therapy    Chief complaint: Back Neck L Leg pain  Pain level 4  Duration Back years Neck 12/2022, L Leg 11/2022  Constant pain  Aggravating Laying for neck  Alleviating rest     Past Medical History:   Diagnosis Date    Arthritis     Diabetes mellitus ([address]     High cholesterol     Migraines     Vision abnormalities         Past Surgical History:   Procedure Laterality Date    APPENDECTOMY  1949    CHOLECYSTECTOMY  1971    COLONOSCOPY  10/12/07    HYSTERECTOMY  1983    Ovaries removed    ORIF HUMERUS DECOMPRESSION Left 2010    SALPINGO-OOPHORECTOMY  1983    TONSILLECTOMY AND ADENOIDECTOMY  1962       Social History     Socioeconomic History    Marital status:      Spouse name: None    Number of children: None    Years of education: None    Highest education level: None   Occupational History    None   Tobacco Use    Smoking status: Never Smoker    Smokeless tobacco: Never Used   Vaping Use    Vaping Use: Never used   Substance and Sexual Activity    Alcohol use: No     Alcohol/week: 0.0 standard drinks    Drug use: No    Sexual activity: Yes     Partners: Male     Birth control/protection: Surgical, Post-menopausal     Comment: hysterectomy BSO   Other Topics Concern    None   Social History Narrative    None     Social Determinants of Health     Financial Resource Strain:     Difficulty of Paying Living Expenses: Not on file   Food Insecurity:     Worried About Running Out of Food in the Last Year: Not on file    Olga of Food in the Last Year: Not on file   Transportation Needs:     Lack of Transportation (Medical): Not on file    Lack of Transportation (Non-Medical):  Not on file   Physical Activity:     Days of Exercise per Week: Not on file    Minutes of Exercise per Session: Not on file   Stress:     Feeling of Stress : Not on file   Social Connections:     Frequency of Communication with Friends and Family: Not on file    Frequency of Social Gatherings with Friends and Family: Not on file    Attends Bahai Services: Not on file    Active Member of Clubs or Organizations: Not on file    Attends Club or Organization Meetings: Not on file    Marital Status: Not on file   Intimate Partner Violence:     Fear of Current or Ex-Partner: Not on file    Emotionally Abused: Not on file    Physically Abused: Not on file    Sexually Abused: Not on file   Housing Stability:     Unable to Pay for Housing in the Last Year: Not on file    Number of [place] in the Last Year: Not on file    Unstable Housing in the Last Year: Not on file       Family History   Problem Relation Age of Onset    Breast Cancer Paternal Cousin         >46 yo    Cancer Neg Hx     Colon Cancer Neg Hx     Diabetes Neg Hx     Eclampsia Neg Hx     Hypertension Neg Hx     Ovarian Cancer Neg Hx      Labor Neg Hx     Spont Abortions Neg Hx     Stroke Neg Hx        Allergies   Allergen Reactions    Aspirin      Intolerance. Upsets stomach    Duricef [Cefadroxil] Hives    Lipitor [Atorvastatin Calcium]      Muscle pain    Macrobid [Nitrofurantoin Monohyd Macro] Hives       Vitals:    22 1247   BP: (!) 124/47   Pulse: 60   Temp: 97.1 °F (36.2 °C)   SpO2: 97%       Current Outpatient Medications   Medication Sig Dispense Refill    tiZANidine (ZANAFLEX) 2 MG tablet Take 1 tablet by mouth every 8 hours as needed (muscle spasms) 90 tablet 0    Multiple Vitamins-Minerals (ICAPS AREDS 2 PO) Take by mouth      guaiFENesin (MUCINEX) 600 MG extended release tablet Take 1,200 mg by mouth 2 times daily      carvedilol (COREG) 25 MG tablet Take 25 mg by mouth 2 times daily (with meals)      benazepril (LOTENSIN) 10 MG tablet Take 10 mg by mouth daily      traMADol (ULTRAM) 50 MG tablet Take 50 mg by mouth every 6 hours as needed for Pain.  simvastatin (ZOCOR) 40 MG tablet Take 40 mg by mouth nightly      celecoxib (CELEBREX) 200 MG capsule Take 200 mg by mouth daily.  metFORMIN (GLUCOPHAGE) 500 MG tablet Take 500 mg by mouth daily (with breakfast).  omeprazole (PRILOSEC) 20 MG capsule Take 20 mg by mouth daily.  Multiple Vitamins-Minerals (PX SENIOR VITAMIN PO) Take  by mouth.  Calcium Carbonate-Vitamin D 600-400 MG-UNIT CHEW Take  by mouth 2 times daily. No current facility-administered medications for this encounter. Review of Systems   Constitutional: Negative for chills and fever. HENT: Negative for congestion and sore throat.     Respiratory: Negative for cough, shortness of breath and wheezing. Gastrointestinal: Negative for constipation, diarrhea, nausea and vomiting. Musculoskeletal: Positive for back pain, gait problem, neck pain and neck stiffness. Neurological: Negative for headaches. Objective:  General Appearance:  Uncomfortable and in pain. Vital signs: (most recent): Blood pressure (!) 124/47, pulse 60, temperature 97.1 °F (36.2 °C), height 5' 2\" (1.575 m), weight 141 lb (64 kg), SpO2 97 %, not currently breastfeeding. Vital signs are normal.  No fever. Output: Producing urine and producing stool. HEENT: Normal HEENT exam.    Lungs:  Normal effort and normal respiratory rate. She is not in respiratory distress. Heart: Normal rate. Extremities: Normal range of motion. There is no deformity. Neurological: Patient is alert and oriented to person, place and time. Patient has normal coordination. Pupils:  Pupils are equal, round, and reactive to light. Pupils are equal.   Skin:  Warm and dry. No rash or cyanosis.    Cervical spinal examination  Range of motion is limited  No apparent deformity on inspection  Positive tenderness to palpation over right cervical paraspinal muscle and facet joint  Gait is stable    Assessment & Plan   Main issue today is neck pain  Pain is chronic flared up for last several months located predominantly on the right side of the neck associated with occipital headache  No radiation in arm  No associated numbness or paresthesia  No changes in bladder or bowel control  No previous cervical spine injection or surgical history  Had recent diagnostic work-up with x-ray cervical spine that showed multilevel cervical facet arthropathy  Have tried physical therapy  Currently taking Tylenol and tramadol find it helpful  Reports no side effect  NSAIDs contraindicated because of advanced age    Back pain  Pain is chronic onset many years ago located in the lower lumbar area with radiation down both legs left more than right  Aggravated the standing and walking and alleviated with sitting and rest  Associated with intermittent left leg numbness and heaviness  Denies any loss of bladder or bowel control  No previous lumbar spine surgical history  Had MRI lumbar spine in 2020 that showed moderate spinal stenosis at L3-4 and L4-5 level  Had lumbar facet injection in past with limited benefit  Have tried physical therapy  1. Cervical spondylosis without myelopathy    2. Lumbosacral spondylosis without myelopathy    3. Spinal stenosis of lumbar region with neurogenic claudication        -Neck axial right-sided cervical spinal pain refractory to conservative measures including therapy and medication  Pain affecting quality of life  Symptoms progressively worsening  Imaging showed facet arthropathy  Clinical exam suggest facet mediated pain  I will recommend for a diagnostic right-sided cervical medial branch nerve block  If that provide short-term relief then consider for confirmatory block and radiofrequency ablation    Back pain  Chronically radiates down both legs aggravated with standing and walking and alleviated with sitting and rest  Imaging showed spinal stenosis  Patient failed conservative measures  Had facet injection in past  I would recommend for a lumbar epidural injection  If that only provide short-term relief then consider for minimally invasive lumbar decompression using mild procedure    Orders Placed This Encounter   Procedures    CA INJ DX/THER AGNT PARAVERT FACET JOINT, CERV/THORAC, 1ST LEVEL      No orders of the defined types were placed in this encounter.            Electronically signed by Damien Das MD on 4/5/2022 at 1:26 PM

## 2022-04-18 ENCOUNTER — HOSPITAL ENCOUNTER (OUTPATIENT)
Dept: GENERAL RADIOLOGY | Age: 82
Discharge: HOME | End: 2022-04-20
Payer: MEDICARE

## 2022-04-18 ENCOUNTER — HOSPITAL ENCOUNTER (OUTPATIENT)
Dept: PAIN MANAGEMENT | Age: 82
Discharge: HOME | End: 2022-04-18
Payer: MEDICARE

## 2022-04-18 VITALS
RESPIRATION RATE: 20 BRPM | DIASTOLIC BLOOD PRESSURE: 71 MMHG | TEMPERATURE: 98 F | OXYGEN SATURATION: 98 % | SYSTOLIC BLOOD PRESSURE: 143 MMHG | HEART RATE: 68 BPM

## 2022-04-18 DIAGNOSIS — R52 PAIN MANAGEMENT: ICD-10-CM

## 2022-04-18 DIAGNOSIS — M47.812 CERVICAL SPONDYLOSIS WITHOUT MYELOPATHY: Primary | Chronic | ICD-10-CM

## 2022-04-18 PROCEDURE — 99152 MOD SED SAME PHYS/QHP 5/>YRS: CPT | Performed by: ANESTHESIOLOGY

## 2022-04-18 PROCEDURE — 2500000003 HC RX 250 WO HCPCS: Performed by: ANESTHESIOLOGY

## 2022-04-18 PROCEDURE — 6360000002 HC RX W HCPCS: Performed by: ANESTHESIOLOGY

## 2022-04-18 PROCEDURE — 64492 INJ PARAVERT F JNT C/T 3 LEV: CPT

## 2022-04-18 PROCEDURE — 64492 INJ PARAVERT F JNT C/T 3 LEV: CPT | Performed by: ANESTHESIOLOGY

## 2022-04-18 PROCEDURE — 2580000003 HC RX 258: Performed by: ANESTHESIOLOGY

## 2022-04-18 PROCEDURE — 64490 INJ PARAVERT F JNT C/T 1 LEV: CPT

## 2022-04-18 PROCEDURE — 3209999900 FLUORO FOR SURGICAL PROCEDURES

## 2022-04-18 PROCEDURE — 64490 INJ PARAVERT F JNT C/T 1 LEV: CPT | Performed by: ANESTHESIOLOGY

## 2022-04-18 PROCEDURE — 64491 INJ PARAVERT F JNT C/T 2 LEV: CPT

## 2022-04-18 PROCEDURE — 64491 INJ PARAVERT F JNT C/T 2 LEV: CPT | Performed by: ANESTHESIOLOGY

## 2022-04-18 PROCEDURE — 6360000004 HC RX CONTRAST MEDICATION: Performed by: ANESTHESIOLOGY

## 2022-04-18 RX ORDER — SODIUM CHLORIDE, SODIUM LACTATE, POTASSIUM CHLORIDE, CALCIUM CHLORIDE 600; 310; 30; 20 MG/100ML; MG/100ML; MG/100ML; MG/100ML
INJECTION, SOLUTION INTRAVENOUS CONTINUOUS PRN
Status: COMPLETED | OUTPATIENT
Start: 2022-04-18 | End: 2022-04-18

## 2022-04-18 RX ORDER — BUPIVACAINE HYDROCHLORIDE 5 MG/ML
INJECTION, SOLUTION EPIDURAL; INTRACAUDAL
Status: COMPLETED | OUTPATIENT
Start: 2022-04-18 | End: 2022-04-18

## 2022-04-18 RX ORDER — FENTANYL CITRATE 50 UG/ML
INJECTION, SOLUTION INTRAMUSCULAR; INTRAVENOUS
Status: COMPLETED | OUTPATIENT
Start: 2022-04-18 | End: 2022-04-18

## 2022-04-18 RX ORDER — LIDOCAINE HYDROCHLORIDE 10 MG/ML
INJECTION, SOLUTION EPIDURAL; INFILTRATION; INTRACAUDAL; PERINEURAL
Status: COMPLETED | OUTPATIENT
Start: 2022-04-18 | End: 2022-04-18

## 2022-04-18 RX ORDER — MIDAZOLAM HYDROCHLORIDE 1 MG/ML
INJECTION INTRAMUSCULAR; INTRAVENOUS
Status: COMPLETED | OUTPATIENT
Start: 2022-04-18 | End: 2022-04-18

## 2022-04-18 RX ADMIN — LIDOCAINE HYDROCHLORIDE 5 ML: 10 INJECTION, SOLUTION EPIDURAL; INFILTRATION; INTRACAUDAL; PERINEURAL at 10:49

## 2022-04-18 RX ADMIN — MIDAZOLAM 1 MG: 1 INJECTION INTRAMUSCULAR; INTRAVENOUS at 10:46

## 2022-04-18 RX ADMIN — IOHEXOL 0.5 ML: 180 INJECTION INTRAVENOUS at 10:52

## 2022-04-18 RX ADMIN — SODIUM CHLORIDE, POTASSIUM CHLORIDE, SODIUM LACTATE AND CALCIUM CHLORIDE 500 ML: 600; 310; 30; 20 INJECTION, SOLUTION INTRAVENOUS at 10:42

## 2022-04-18 RX ADMIN — BUPIVACAINE HYDROCHLORIDE 3 ML: 5 INJECTION, SOLUTION EPIDURAL; INTRACAUDAL; PERINEURAL at 10:53

## 2022-04-18 RX ADMIN — Medication 50 MCG: at 10:46

## 2022-04-18 ASSESSMENT — PAIN - FUNCTIONAL ASSESSMENT
PAIN_FUNCTIONAL_ASSESSMENT: ACTIVITIES ARE NOT PREVENTED
PAIN_FUNCTIONAL_ASSESSMENT: 0-10
PAIN_FUNCTIONAL_ASSESSMENT: 0-10

## 2022-04-18 ASSESSMENT — PAIN DESCRIPTION - DESCRIPTORS: DESCRIPTORS: SHARP;DISCOMFORT

## 2022-04-18 NOTE — OP NOTE
Preoperative Diagnosis: Cervical spondylosis and chronic cervicalgia  Postoperative Diagnosis: Cervical spondylosis and chronic cervicalgia    Procedure Performed:  Right Cervical Medical branch nerve block at C2 / C3 / C4 / C5 nerves under fluoroscopy guidance    Procedure: The Patient was seen in the preop area, chart was reviewed, informed consent was obtained. Patient was taken to procedure room and was placed in lateral position with procedure side facing upward. . Vital signs were monitored through out the  Procedure. A time out was completed. The site was prepped and draped in sterile manner. The target point was identified with fluoro guidance. Skin and deep tissues were anesthetized with 1 % lidocaine. A  25-gauge needlele was advanced under fluoroscopy guidance to the targets until a bony contact was made. Position was conformed in AP and lateral views. Then after negative aspiration contrast dye was injected that showed  spread of the contrast over the target area  With no epidural, vascular runoff or intrathecal spread. Finally 0. 5 ml of treatment solution containing 0.5 % BUPIVACAINE was injected at each spot. The needle was removed and a Band-Aid was placed over the needle  insertion site. The patient's vital signs remained stable and the patient tolerated the procedure well. Post Procedure pain score in recovery 15 minutes later was 0-1/10      SEDATION NOTE:    ASA CLASSIFICATION  2  MP   CLASSIFICATION  2    Moderate intravenous conscious sedation was supervised by Dr. Denver Padgett  The patient was independently monitored by a Registered Nurse assigned to the Procedure Room  Monitoring included automated blood pressure, continuous EKG, Capnography and continuous pulse oximetry. The detailed Conscious Record is permanently stored in the [place] [address].      The following is the conscious sedation record;  Start Time:  1042  End times:  1056  Duration:  14 MINUTES  MEDS GIVEN 1 MG VERSED AND 50 MCG FENTANYL

## 2022-05-03 ENCOUNTER — HOSPITAL ENCOUNTER (OUTPATIENT)
Dept: PAIN MANAGEMENT | Age: 82
Discharge: HOME | End: 2022-05-03
Payer: MEDICARE

## 2022-05-03 VITALS
TEMPERATURE: 96.6 F | SYSTOLIC BLOOD PRESSURE: 139 MMHG | HEART RATE: 57 BPM | DIASTOLIC BLOOD PRESSURE: 55 MMHG | OXYGEN SATURATION: 98 %

## 2022-05-03 DIAGNOSIS — M48.062 SPINAL STENOSIS OF LUMBAR REGION WITH NEUROGENIC CLAUDICATION: ICD-10-CM

## 2022-05-03 DIAGNOSIS — M47.812 CERVICAL SPONDYLOSIS WITHOUT MYELOPATHY: ICD-10-CM

## 2022-05-03 PROCEDURE — 99213 OFFICE O/P EST LOW 20 MIN: CPT | Performed by: NURSE PRACTITIONER

## 2022-05-03 PROCEDURE — 99213 OFFICE O/P EST LOW 20 MIN: CPT

## 2022-05-03 RX ORDER — ACETAMINOPHEN 500 MG
500 TABLET ORAL EVERY 6 HOURS PRN
COMMUNITY

## 2022-05-03 RX ORDER — GABAPENTIN 100 MG/1
100 CAPSULE ORAL 3 TIMES DAILY
COMMUNITY

## 2022-05-03 RX ORDER — FERROUS SULFATE 325(65) MG
325 TABLET ORAL
COMMUNITY

## 2022-05-03 RX ORDER — LEVOTHYROXINE SODIUM 0.05 MG/1
50 TABLET ORAL DAILY
COMMUNITY

## 2022-05-03 ASSESSMENT — ENCOUNTER SYMPTOMS
BOWEL INCONTINENCE: 0
COUGH: 0
SHORTNESS OF BREATH: 0
BACK PAIN: 1
CONSTIPATION: 0

## 2022-05-03 ASSESSMENT — PAIN SCALES - GENERAL: PAINLEVEL_OUTOF10: 5

## 2022-05-03 NOTE — PROGRESS NOTES
Chief Complaint:   Chief Complaint   Patient presents with    Back Pain    Neck Pain    Follow Up After Procedure       RAISSA Johnson 25-year-old female Seen in our clinic today for neck and back pain     neck pain  Had recent diagnostic work-up with x-ray cervical spine that showed multilevel cervical facet arthropathy  Has tried physical therapy  Currently taking Tylenol and tramadol from PCP and finds it helpful  Reports no side effect  NSAIDs contraindicated because of advanced age  Here today for f/u after Right Cervical Medical branch nerve block at C2 / C3 / C4 / C5 nerves and reports almost 100% relief of pain for 1 day and able to increase activity for about 1 day      Back pain  Pain is chronic onset many years ago located in the lower lumbar area with radiation down both legs left more than right  Aggravated the standing and walking and alleviated with sitting and rest  Associated with intermittent left leg numbness and heaviness  Denies any loss of bladder or bowel control  No previous lumbar spine surgical history  Had MRI lumbar spine in 2020 that showed moderate spinal stenosis at L3-4 and L4-5 level  Had lumbar facet injection in past with limited benefit. MD POC is for a lumbar epidural injection  If that only provides short-term relief then consider for minimally invasive lumbar decompression using mild procedure        HPI:     Back Pain  The current episode started more than 1 month ago. The problem occurs constantly. The problem is unchanged. Pain location: Neck. The quality of the pain is described as stabbing and burning. Radiates to: Shoulder. The pain is at a severity of 5/10. The pain is moderate. The pain is the same all the time. The symptoms are aggravated by lying down. Stiffness is present all day. Pertinent negatives include no bladder incontinence, bowel incontinence, chest pain, fever, headaches, numbness, tingling or weakness.  Risk factors include menopause, obesity and poor posture. She has tried analgesics (physical therapy) for the symptoms. The treatment provided mild relief. Neck Pain   This is a chronic problem. The current episode started more than 1 year ago. The problem occurs constantly. The problem has been gradually worsening. The pain is associated with nothing. The pain is present in the midline and right side. The quality of the pain is described as aching. The pain is at a severity of 5/10. The pain is moderate. The symptoms are aggravated by position. Pertinent negatives include no chest pain, fever, headaches, numbness, tingling or weakness. She has tried heat, oral narcotics and muscle relaxants for the symptoms. The treatment provided mild relief. Past Medical History:   Diagnosis Date    Arthritis     Diabetes mellitus ([facility] [address]     High cholesterol     Migraines     Vision abnormalities        Past Surgical History:   Procedure Laterality Date    APPENDECTOMY  1949    CHOLECYSTECTOMY  1971    COLONOSCOPY  10/12/07    HYSTERECTOMY  1983    Ovaries removed    ORIF HUMERUS DECOMPRESSION Left 2010    SALPINGO-OOPHORECTOMY  1983    TONSILLECTOMY AND ADENOIDECTOMY  1962       Allergies   Allergen Reactions    Aspirin      Intolerance. Upsets stomach    Duricef [Cefadroxil] Hives    Lipitor [Atorvastatin Calcium]      Muscle pain    Macrobid [Nitrofurantoin Monohyd Macro] Hives         Current Outpatient Medications:     gabapentin (NEURONTIN) 100 MG capsule, Take 100 mg by mouth 3 times daily. , Disp: , Rfl:     ferrous sulfate (IRON 325) 325 (65 Fe) MG tablet, Take 325 mg by mouth daily (with breakfast), Disp: , Rfl:     levothyroxine (SYNTHROID) 50 MCG tablet, Take 50 mcg by mouth Daily, Disp: , Rfl:     acetaminophen (TYLENOL) 500 MG tablet, Take 500 mg by mouth every 6 hours as needed for Pain, Disp: , Rfl:     tiZANidine (ZANAFLEX) 2 MG tablet, Take 1 tablet by mouth every 8 hours as needed (muscle spasms), Disp: 90 tablet, Rfl: 0    Multiple Vitamins-Minerals (ICAPS AREDS 2 PO), Take by mouth, Disp: , Rfl:     guaiFENesin (MUCINEX) 600 MG extended release tablet, Take 1,200 mg by mouth 2 times daily , Disp: , Rfl:     carvedilol (COREG) 25 MG tablet, Take 25 mg by mouth 2 times daily (with meals), Disp: , Rfl:     benazepril (LOTENSIN) 10 MG tablet, Take 10 mg by mouth daily, Disp: , Rfl:     traMADol (ULTRAM) 50 MG tablet, Take 50 mg by mouth every 6 hours as needed for Pain., Disp: , Rfl:     simvastatin (ZOCOR) 40 MG tablet, Take 40 mg by mouth nightly, Disp: , Rfl:     celecoxib (CELEBREX) 200 MG capsule, Take 200 mg by mouth daily. , Disp: , Rfl:     metFORMIN (GLUCOPHAGE) 500 MG tablet, Take 500 mg by mouth daily (with breakfast). , Disp: , Rfl:     omeprazole (PRILOSEC) 20 MG capsule, Take 20 mg by mouth daily. , Disp: , Rfl:     Multiple Vitamins-Minerals (PX SENIOR VITAMIN PO), Take  by mouth., Disp: , Rfl:     Calcium Carbonate-Vitamin D 600-400 MG-UNIT CHEW, Take  by mouth 2 times daily. , Disp: , Rfl:     Family History   Problem Relation Age of Onset    Breast Cancer Paternal Cousin         >48 yo    Cancer Neg Hx     Colon Cancer Neg Hx     Diabetes Neg Hx     Eclampsia Neg Hx     Hypertension Neg Hx     Ovarian Cancer Neg Hx      Labor Neg Hx     Spont Abortions Neg Hx     Stroke Neg Hx        Social History     Socioeconomic History    Marital status:      Spouse name: Not on file    Number of children: Not on file    Years of education: Not on file    Highest education level: Not on file   Occupational History    Not on file   Tobacco Use    Smoking status: Never Smoker    Smokeless tobacco: Never Used   Vaping Use    Vaping Use: Never used   Substance and Sexual Activity    Alcohol use: No     Alcohol/week: 0.0 standard drinks    Drug use: No    Sexual activity: Yes     Partners: Male     Birth control/protection: Surgical, Post-menopausal     Comment: hysterectomy BSO Other Topics Concern    Not on file   Social History Narrative    Not on file     Social Determinants of Health     Financial Resource Strain:     Difficulty of Paying Living Expenses: Not on file   Food Insecurity:     Worried About Running Out of Food in the Last Year: Not on file    Olga of Food in the Last Year: Not on file   Transportation Needs:     Lack of Transportation (Medical): Not on file    Lack of Transportation (Non-Medical): Not on file   Physical Activity:     Days of Exercise per Week: Not on file    Minutes of Exercise per Session: Not on file   Stress:     Feeling of Stress : Not on file   Social Connections:     Frequency of Communication with Friends and Family: Not on file    Frequency of Social Gatherings with Friends and Family: Not on file    Attends Restorationist Services: Not on file    Active Member of [address] [place] [address] or Organizations: Not on file    Attends Club or Organization Meetings: Not on file    Marital Status: Not on file   Intimate Partner Violence:     Fear of Current or Ex-Partner: Not on file    Emotionally Abused: Not on file    Physically Abused: Not on file    Sexually Abused: Not on file   Housing Stability:     Unable to Pay for Housing in the Last Year: Not on file    Number of [place] in the Last Year: Not on file    Unstable Housing in the Last Year: Not on file       Review of Systems:  Review of Systems   Constitutional: Negative for chills and fever. Cardiovascular: Negative for chest pain. Respiratory: Negative for cough and shortness of breath. Musculoskeletal: Positive for back pain and neck pain. Gastrointestinal: Negative for bowel incontinence and constipation. Genitourinary: Negative for bladder incontinence. Neurological: Negative for headaches, numbness, tingling and weakness.        Physical Exam:  BP (!) 139/55   Pulse 57   Temp 96.6 °F (35.9 °C)   LMP  (LMP Unknown)   SpO2 98%     Physical Exam  Cardiovascular: Rate and Rhythm: Normal rate. Pulmonary:      Effort: Pulmonary effort is normal.   Musculoskeletal:         General: Normal range of motion. Skin:     General: Skin is warm and dry. Neurological:      Mental Status: She is alert and oriented to person, place, and time. Assessment:  Problem List Items Addressed This Visit     Spinal stenosis of lumbar region with neurogenic claudication (Chronic)    Relevant Medications    gabapentin (NEURONTIN) 100 MG capsule    acetaminophen (TYLENOL) 500 MG tablet    Cervical spondylosis without myelopathy (Chronic)    Relevant Medications    acetaminophen (TYLENOL) 500 MG tablet    Other Relevant Orders    [place] INJ DX/THER AGNT PARAVERT FACET JOINT, CERV/THORAC, ADD LEVEL             Treatment Plan:    Confirmatory  Right Cervical Medical branch nerve block at C2 / C3 / C4 / C5 nerves ordered      I have reviewed the chief complaint and history of present illness (including ROS and PFSH) and vital documentation by my staff and I agree with their documentation and have added where applicable.

## 2022-05-23 ENCOUNTER — HOSPITAL ENCOUNTER (OUTPATIENT)
Dept: GENERAL RADIOLOGY | Age: 82
Discharge: HOME | End: 2022-05-25
Payer: MEDICARE

## 2022-05-23 ENCOUNTER — HOSPITAL ENCOUNTER (OUTPATIENT)
Dept: PAIN MANAGEMENT | Age: 82
Discharge: HOME | End: 2022-05-23
Payer: MEDICARE

## 2022-05-23 VITALS
DIASTOLIC BLOOD PRESSURE: 75 MMHG | HEIGHT: 62 IN | SYSTOLIC BLOOD PRESSURE: 194 MMHG | WEIGHT: 140 LBS | HEART RATE: 63 BPM | BODY MASS INDEX: 25.76 KG/M2 | RESPIRATION RATE: 18 BRPM | OXYGEN SATURATION: 95 % | TEMPERATURE: 97.9 F

## 2022-05-23 DIAGNOSIS — R52 PAIN MANAGEMENT: ICD-10-CM

## 2022-05-23 DIAGNOSIS — M47.812 CERVICAL SPONDYLOSIS WITHOUT MYELOPATHY: Primary | Chronic | ICD-10-CM

## 2022-05-23 PROCEDURE — 64492 INJ PARAVERT F JNT C/T 3 LEV: CPT | Performed by: ANESTHESIOLOGY

## 2022-05-23 PROCEDURE — 64490 INJ PARAVERT F JNT C/T 1 LEV: CPT | Performed by: ANESTHESIOLOGY

## 2022-05-23 PROCEDURE — 3209999900 FLUORO FOR SURGICAL PROCEDURES

## 2022-05-23 PROCEDURE — 64491 INJ PARAVERT F JNT C/T 2 LEV: CPT | Performed by: ANESTHESIOLOGY

## 2022-05-23 PROCEDURE — 64492 INJ PARAVERT F JNT C/T 3 LEV: CPT

## 2022-05-23 PROCEDURE — 64491 INJ PARAVERT F JNT C/T 2 LEV: CPT

## 2022-05-23 PROCEDURE — 6360000002 HC RX W HCPCS: Performed by: ANESTHESIOLOGY

## 2022-05-23 PROCEDURE — 99152 MOD SED SAME PHYS/QHP 5/>YRS: CPT | Performed by: ANESTHESIOLOGY

## 2022-05-23 PROCEDURE — 64490 INJ PARAVERT F JNT C/T 1 LEV: CPT

## 2022-05-23 PROCEDURE — 6360000004 HC RX CONTRAST MEDICATION: Performed by: ANESTHESIOLOGY

## 2022-05-23 PROCEDURE — 2500000003 HC RX 250 WO HCPCS: Performed by: ANESTHESIOLOGY

## 2022-05-23 RX ORDER — BUPIVACAINE HYDROCHLORIDE 5 MG/ML
INJECTION, SOLUTION EPIDURAL; INTRACAUDAL
Status: COMPLETED | OUTPATIENT
Start: 2022-05-23 | End: 2022-05-23

## 2022-05-23 RX ORDER — LIDOCAINE HYDROCHLORIDE 10 MG/ML
INJECTION, SOLUTION EPIDURAL; INFILTRATION; INTRACAUDAL; PERINEURAL
Status: COMPLETED | OUTPATIENT
Start: 2022-05-23 | End: 2022-05-23

## 2022-05-23 RX ORDER — MIDAZOLAM HYDROCHLORIDE 1 MG/ML
INJECTION INTRAMUSCULAR; INTRAVENOUS
Status: COMPLETED | OUTPATIENT
Start: 2022-05-23 | End: 2022-05-23

## 2022-05-23 RX ADMIN — MIDAZOLAM 1 MG: 1 INJECTION INTRAMUSCULAR; INTRAVENOUS at 10:47

## 2022-05-23 RX ADMIN — BUPIVACAINE HYDROCHLORIDE 3 ML: 5 INJECTION, SOLUTION EPIDURAL; INTRACAUDAL; PERINEURAL at 10:49

## 2022-05-23 RX ADMIN — LIDOCAINE HYDROCHLORIDE 2 ML: 10 INJECTION, SOLUTION EPIDURAL; INFILTRATION; INTRACAUDAL; PERINEURAL at 10:47

## 2022-05-23 RX ADMIN — IOHEXOL 1 ML: 180 INJECTION INTRAVENOUS at 10:49

## 2022-05-23 ASSESSMENT — PAIN DESCRIPTION - DESCRIPTORS: DESCRIPTORS: SHARP;SHOOTING

## 2022-05-23 ASSESSMENT — PAIN - FUNCTIONAL ASSESSMENT
PAIN_FUNCTIONAL_ASSESSMENT: NONE - DENIES PAIN
PAIN_FUNCTIONAL_ASSESSMENT: 0-10
PAIN_FUNCTIONAL_ASSESSMENT: PREVENTS OR INTERFERES SOME ACTIVE ACTIVITIES AND ADLS

## 2022-05-23 NOTE — OP NOTE
Preoperative Diagnosis: Cervical spondylosis and chronic cervicalgia  Postoperative Diagnosis: Cervical spondylosis and chronic cervicalgia    Procedure Performed:  Right Cervical Medical branch nerve block at C2 / C3/ C4/ C5 nerves under fluoroscopy guidance    Procedure: The Patient was seen in the preop area, chart was reviewed, informed consent was obtained. Patient was taken to procedure room and was placed in lateral position with procedure side facing upward. . Vital signs were monitored through out the  Procedure. A time out was completed. The site was prepped and draped in sterile manner. The target point was identified with fluoro guidance. Skin and deep tissues were anesthetized with 1 % lidocaine. A  25-gauge needlele was advanced under fluoroscopy guidance to the targets until a bony contact was made. Position was conformed in AP and lateral views. Then after negative aspiration contrast dye was injected that showed  spread of the contrast over the target area  With no epidural, vascular runoff or intrathecal spread. Finally 0. 5 ml of treatment solution containing 0.5 % BUPIVACAINE was injected at each spot. The needle was removed and a Band-Aid was placed over the needle  insertion site. The patient's vital signs remained stable and the patient tolerated the procedure well. Post Procedure pain score in recovery 15 minutes later was 0-1/10    SEDATION NOTE:    ASA CLASSIFICATION  3  MP   CLASSIFICATION  3    Moderate intravenous conscious sedation was supervised by Dr. Verito Tee  The patient was independently monitored by a Registered Nurse assigned to the Procedure Room  Monitoring included automated blood pressure, continuous EKG, Capnography and continuous pulse oximetry. The detailed Conscious Record is permanently stored in the [place] [address].      The following is the conscious sedation record;  Start Time:  1043  End times:  1053  Duration:  10 MINUTES  MEDS GIVEN 1 MG VERSED AND 0 MCG FENTANYL

## 2022-05-23 NOTE — H&P
UPDATE:  Office visit pain clinic in [facility] with all required elements of H&P dated 05/03/2022  Patient seen preop, chart reviewed,   No changes in medical history and health assessment since last evaluation. PE:  AAO x 3, in NAD, VSS,. Resp: breathing on RA, no respiratory distress  Cardiac: rate normal    Risk / Benefits explained to patient, patient agree to proceed with plan.   ASA 3  MP 3

## 2022-05-25 ENCOUNTER — HOSPITAL ENCOUNTER (OUTPATIENT)
Age: 82
Setting detail: SPECIMEN
Discharge: HOME | End: 2022-05-25

## 2022-05-27 LAB
CULTURE: ABNORMAL
SPECIMEN DESCRIPTION: ABNORMAL

## 2022-06-07 ENCOUNTER — HOSPITAL ENCOUNTER (OUTPATIENT)
Dept: PAIN MANAGEMENT | Age: 82
Discharge: HOME | End: 2022-06-07
Payer: MEDICARE

## 2022-06-07 VITALS
HEART RATE: 66 BPM | DIASTOLIC BLOOD PRESSURE: 52 MMHG | OXYGEN SATURATION: 98 % | TEMPERATURE: 97.1 F | SYSTOLIC BLOOD PRESSURE: 124 MMHG

## 2022-06-07 DIAGNOSIS — M47.812 CERVICAL SPONDYLOSIS WITHOUT MYELOPATHY: ICD-10-CM

## 2022-06-07 DIAGNOSIS — M48.062 SPINAL STENOSIS OF LUMBAR REGION WITH NEUROGENIC CLAUDICATION: ICD-10-CM

## 2022-06-07 PROCEDURE — 99213 OFFICE O/P EST LOW 20 MIN: CPT

## 2022-06-07 PROCEDURE — 99213 OFFICE O/P EST LOW 20 MIN: CPT | Performed by: NURSE PRACTITIONER

## 2022-06-07 ASSESSMENT — ENCOUNTER SYMPTOMS
BOWEL INCONTINENCE: 0
BACK PAIN: 1

## 2022-06-07 NOTE — PROGRESS NOTES
Chief Complaint   Patient presents with    Back Pain    Neck Pain    Follow Up After Procedure       PMBRIAN Johnson 78-year-old female Seen in our clinic today for neck and back pain     neck pain  Had recent diagnostic work-up with x-ray cervical spine that showed multilevel cervical facet arthropathy  Has tried physical therapy  Currently taking Tylenol and tramadol from PCP and finds it helpful  Reports no side effect  NSAIDs contraindicated because of advanced age  Here today for f/u after confirmatory Right Cervical Medical branch nerve block at C2 / C3 / C4 / [facility] and reports almost 100% relief of pain for 1 day and able to increase activity for about 1 day. Pain has gradually returned to baseline.      Back pain  Pain is chronic onset many years ago located in the lower lumbar area with radiation down both legs left more than right  Aggravated the standing and walking and alleviated with sitting and rest  Associated with intermittent left leg numbness and heaviness  Denies any loss of bladder or bowel control  No previous lumbar spine surgical history  Had MRI lumbar spine in 2020 that showed moderate spinal stenosis at L3-4 and L4-5 level  Had lumbar facet injection in past with limited benefit. MD POC is for a lumbar epidural injection  If that only provides short-term relief then consider for minimally invasive lumbar decompression using mild procedure          Back Pain  This is a chronic problem. The current episode started more than 1 year ago. The problem occurs constantly. The problem is unchanged. The pain is present in the lumbar spine (neck pain). The quality of the pain is described as aching and burning. The pain is at a severity of 6/10. The pain is moderate. The pain is worse during the day. The symptoms are aggravated by twisting. Stiffness is present all day. Associated symptoms include weakness.  Pertinent negatives include no bladder incontinence, bowel incontinence, chest pain, fever, headaches, numbness or tingling. She has tried analgesics for the symptoms. The treatment provided mild relief. Neck Pain   This is a chronic problem. The current episode started more than 1 year ago. The problem occurs constantly. The problem has been unchanged. The pain is present in the right side. The quality of the pain is described as aching. The pain is at a severity of 7/10. The pain is moderate. The symptoms are aggravated by position and twisting. Associated symptoms include weakness. Pertinent negatives include no chest pain, fever, headaches, numbness or tingling. She has tried bed rest (cervical MBB) for the symptoms. The treatment provided significant relief. Patient denies any new neurological symptoms. No bowel or bladder incontinence, no weakness, and no falling. Past Medical History:   Diagnosis Date    Arthritis     Diabetes mellitus ([facility] [address]     High cholesterol     Migraines     Vision abnormalities        Past Surgical History:   Procedure Laterality Date    APPENDECTOMY  1949    CHOLECYSTECTOMY  1971    COLONOSCOPY  10/12/07    HYSTERECTOMY  1983    Ovaries removed    ORIF HUMERUS DECOMPRESSION Left 2010    PAIN MANAGEMENT PROCEDURE      SALPINGO-OOPHORECTOMY  1983    TONSILLECTOMY AND ADENOIDECTOMY  1962       Allergies   Allergen Reactions    Aspirin      Intolerance. Upsets stomach    Duricef [Cefadroxil] Hives    Lipitor [Atorvastatin Calcium]      Muscle pain    Macrobid [Nitrofurantoin Monohyd Macro] Hives         Current Outpatient Medications:     gabapentin (NEURONTIN) 100 MG capsule, Take 100 mg by mouth 3 times daily. , Disp: , Rfl:     ferrous sulfate (IRON 325) 325 (65 Fe) MG tablet, Take 325 mg by mouth daily (with breakfast), Disp: , Rfl:     levothyroxine (SYNTHROID) 50 MCG tablet, Take 50 mcg by mouth Daily, Disp: , Rfl:     acetaminophen (TYLENOL) 500 MG tablet, Take 500 mg by mouth every 6 hours as needed for Pain, Disp: , Rfl:    tiZANidine (ZANAFLEX) 2 MG tablet, Take 1 tablet by mouth every 8 hours as needed (muscle spasms), Disp: 90 tablet, Rfl: 0    Multiple Vitamins-Minerals (ICAPS AREDS 2 PO), Take by mouth, Disp: , Rfl:     guaiFENesin (MUCINEX) 600 MG extended release tablet, Take 1,200 mg by mouth 2 times daily , Disp: , Rfl:     carvedilol (COREG) 25 MG tablet, Take 25 mg by mouth 2 times daily (with meals), Disp: , Rfl:     benazepril (LOTENSIN) 10 MG tablet, Take 10 mg by mouth daily, Disp: , Rfl:     traMADol (ULTRAM) 50 MG tablet, Take 50 mg by mouth every 6 hours as needed for Pain., Disp: , Rfl:     simvastatin (ZOCOR) 40 MG tablet, Take 40 mg by mouth nightly, Disp: , Rfl:     celecoxib (CELEBREX) 200 MG capsule, Take 200 mg by mouth daily. , Disp: , Rfl:     metFORMIN (GLUCOPHAGE) 500 MG tablet, Take 500 mg by mouth daily (with breakfast). , Disp: , Rfl:     omeprazole (PRILOSEC) 20 MG capsule, Take 20 mg by mouth daily. , Disp: , Rfl:     Multiple Vitamins-Minerals (PX SENIOR VITAMIN PO), Take  by mouth., Disp: , Rfl:     Calcium Carbonate-Vitamin D 600-400 MG-UNIT CHEW, Take  by mouth 2 times daily. , Disp: , Rfl:     Family History   Problem Relation Age of Onset    Breast Cancer Paternal Cousin         >48 yo    Cancer Neg Hx     Colon Cancer Neg Hx     Diabetes Neg Hx     Eclampsia Neg Hx     Hypertension Neg Hx     Ovarian Cancer Neg Hx      Labor Neg Hx     Spont Abortions Neg Hx     Stroke Neg Hx        Social History     Socioeconomic History    Marital status:      Spouse name: Not on file    Number of children: Not on file    Years of education: Not on file    Highest education level: Not on file   Occupational History    Not on file   Tobacco Use    Smoking status: Never Smoker    Smokeless tobacco: Never Used   Vaping Use    Vaping Use: Never used   Substance and Sexual Activity    Alcohol use: No     Alcohol/week: 0.0 standard drinks    Drug use: No    Sexual activity: Yes     Partners: Male     Birth control/protection: Surgical, Post-menopausal     Comment: hysterectomy BSO   Other Topics Concern    Not on file   Social History Narrative    Not on file     Social Determinants of Health     Financial Resource Strain:     Difficulty of Paying Living Expenses: Not on file   Food Insecurity:     Worried About Running Out of Food in the Last Year: Not on file    Olga of Food in the Last Year: Not on file   Transportation Needs:     Lack of Transportation (Medical): Not on file    Lack of Transportation (Non-Medical): Not on file   Physical Activity:     Days of Exercise per Week: Not on file    Minutes of Exercise per Session: Not on file   Stress:     Feeling of Stress : Not on file   Social Connections:     Frequency of Communication with Friends and Family: Not on file    Frequency of Social Gatherings with Friends and Family: Not on file    Attends Shinto Services: Not on file    Active Member of [address] [place] [address] or Organizations: Not on file    Attends Club or Organization Meetings: Not on file    Marital Status: Not on file   Intimate Partner Violence:     Fear of Current or Ex-Partner: Not on file    Emotionally Abused: Not on file    Physically Abused: Not on file    Sexually Abused: Not on file   Housing Stability:     Unable to Pay for Housing in the Last Year: Not on file    Number of [place] in the Last Year: Not on file    Unstable Housing in the Last Year: Not on file       Review of Systems:  Review of Systems   Constitutional: Negative for fever. Cardiovascular: Negative for chest pain and palpitations. Musculoskeletal: Positive for back pain and neck pain. Gastrointestinal: Negative for bowel incontinence. Genitourinary: Negative for bladder incontinence. Neurological: Positive for weakness. Negative for disturbances in coordination, headaches, loss of balance, numbness and tingling.        Physical Exam:  BP (!) 124/52   Pulse 66 Temp 97.1 °F (36.2 °C)   LMP  (LMP Unknown)   SpO2 98%     Physical Exam  HENT:      Head: Normocephalic. Pulmonary:      Effort: Pulmonary effort is normal.   Musculoskeletal:         General: Normal range of motion. Cervical back: Normal range of motion. Tenderness present. Lumbar back: Tenderness present. Skin:     General: Skin is warm and dry. Neurological:      Mental Status: She is alert and oriented to person, place, and time. Record/Diagnostics Review:    FINDINGS:     4 views of the cervical spine were obtained. Bernadette [place] is loss of intervertebral disc height throughout the cervical region greatest at C5-C6 and C6-C7.  Extensive facet degenerative changes are present. Bernadette [place] is no focal vertebral deformity or malalignment.  Prevertebral contours are within normal   limits. IMPRESSION:     Cervical degenerative changes with no focal abnormality evident radiographically. Assessment:  Problem List Items Addressed This Visit     Spinal stenosis of lumbar region with neurogenic claudication (Chronic)    Cervical spondylosis without myelopathy (Chronic)    Relevant Orders    Saline lock IV    FLUORO FOR SURGICAL PROCEDURES    ND INJ DX/THER AGNT PARAVERT FACET JOINT, CERV/THORAC, 1ST LEVEL             Treatment Plan:  Excellent relief following confirmatory Right Cervical Medical branch nerve block at C2 / Zuly Ramirez / Leo Mcneill / Akanksha San. Discussed RFA and she would like to schedule this. Pre-procedural instructions are reviewed. Follow up after procedure  Consider LESI if low back pain continues  If no relief from LESI, consider MILD per Dr. Kirk Side POC     I have reviewed the chief complaint and history of present illness (including ROS and [address]) and vital documentation by my staff and I agree with their documentation and have added where applicable.

## 2022-06-20 ENCOUNTER — HOSPITAL ENCOUNTER (OUTPATIENT)
Dept: GENERAL RADIOLOGY | Age: 82
Discharge: HOME | End: 2022-06-22
Payer: MEDICARE

## 2022-06-20 ENCOUNTER — HOSPITAL ENCOUNTER (OUTPATIENT)
Dept: PAIN MANAGEMENT | Age: 82
Discharge: HOME | End: 2022-06-20
Payer: MEDICARE

## 2022-06-20 VITALS
RESPIRATION RATE: 11 BRPM | SYSTOLIC BLOOD PRESSURE: 157 MMHG | HEART RATE: 61 BPM | OXYGEN SATURATION: 99 % | TEMPERATURE: 97.5 F | HEIGHT: 62 IN | BODY MASS INDEX: 25.21 KG/M2 | WEIGHT: 137 LBS | DIASTOLIC BLOOD PRESSURE: 67 MMHG

## 2022-06-20 DIAGNOSIS — M47.812 CERVICAL SPONDYLOSIS WITHOUT MYELOPATHY: Primary | Chronic | ICD-10-CM

## 2022-06-20 DIAGNOSIS — R52 PAIN MANAGEMENT: ICD-10-CM

## 2022-06-20 PROCEDURE — 6360000002 HC RX W HCPCS: Performed by: ANESTHESIOLOGY

## 2022-06-20 PROCEDURE — 64634 DESTROY C/TH FACET JNT ADDL: CPT | Performed by: ANESTHESIOLOGY

## 2022-06-20 PROCEDURE — 64633 DESTROY CERV/THOR FACET JNT: CPT

## 2022-06-20 PROCEDURE — 2580000003 HC RX 258: Performed by: ANESTHESIOLOGY

## 2022-06-20 PROCEDURE — 99152 MOD SED SAME PHYS/QHP 5/>YRS: CPT | Performed by: ANESTHESIOLOGY

## 2022-06-20 PROCEDURE — 64633 DESTROY CERV/THOR FACET JNT: CPT | Performed by: ANESTHESIOLOGY

## 2022-06-20 PROCEDURE — 2500000003 HC RX 250 WO HCPCS: Performed by: ANESTHESIOLOGY

## 2022-06-20 PROCEDURE — 3209999900 FLUORO FOR SURGICAL PROCEDURES

## 2022-06-20 PROCEDURE — 64634 DESTROY C/TH FACET JNT ADDL: CPT

## 2022-06-20 RX ORDER — MIDAZOLAM HYDROCHLORIDE 1 MG/ML
INJECTION INTRAMUSCULAR; INTRAVENOUS
Status: COMPLETED | OUTPATIENT
Start: 2022-06-20 | End: 2022-06-20

## 2022-06-20 RX ORDER — LIDOCAINE HYDROCHLORIDE 40 MG/ML
INJECTION, SOLUTION RETROBULBAR; TOPICAL
Status: COMPLETED | OUTPATIENT
Start: 2022-06-20 | End: 2022-06-20

## 2022-06-20 RX ORDER — SODIUM CHLORIDE, SODIUM LACTATE, POTASSIUM CHLORIDE, CALCIUM CHLORIDE 600; 310; 30; 20 MG/100ML; MG/100ML; MG/100ML; MG/100ML
INJECTION, SOLUTION INTRAVENOUS CONTINUOUS PRN
Status: COMPLETED | OUTPATIENT
Start: 2022-06-20 | End: 2022-06-20

## 2022-06-20 RX ORDER — FENTANYL CITRATE 50 UG/ML
INJECTION, SOLUTION INTRAMUSCULAR; INTRAVENOUS
Status: COMPLETED | OUTPATIENT
Start: 2022-06-20 | End: 2022-06-20

## 2022-06-20 RX ORDER — LIDOCAINE HYDROCHLORIDE 10 MG/ML
INJECTION, SOLUTION EPIDURAL; INFILTRATION; INTRACAUDAL; PERINEURAL
Status: COMPLETED | OUTPATIENT
Start: 2022-06-20 | End: 2022-06-20

## 2022-06-20 RX ADMIN — Medication 50 MCG: at 09:29

## 2022-06-20 RX ADMIN — SODIUM CHLORIDE, POTASSIUM CHLORIDE, SODIUM LACTATE AND CALCIUM CHLORIDE 500 ML: 600; 310; 30; 20 INJECTION, SOLUTION INTRAVENOUS at 09:23

## 2022-06-20 RX ADMIN — LIDOCAINE HYDROCHLORIDE 3 ML: 40 INJECTION, SOLUTION RETROBULBAR; TOPICAL at 09:47

## 2022-06-20 RX ADMIN — LIDOCAINE HYDROCHLORIDE 5 ML: 10 INJECTION, SOLUTION EPIDURAL; INFILTRATION; INTRACAUDAL; PERINEURAL at 09:30

## 2022-06-20 RX ADMIN — MIDAZOLAM 1 MG: 1 INJECTION INTRAMUSCULAR; INTRAVENOUS at 09:29

## 2022-06-20 ASSESSMENT — PAIN DESCRIPTION - DESCRIPTORS: DESCRIPTORS: SHARP;STABBING;SHOOTING

## 2022-06-20 ASSESSMENT — PAIN SCALES - GENERAL: PAINLEVEL_OUTOF10: 0

## 2022-06-20 NOTE — H&P
UPDATE:  Office visit pain clinic in [facility] with all required elements of H&P dated 06/07/2022  Patient seen preop, chart reviewed,   No changes in medical history and health assessment since last evaluation. PE:  AAO x 3, in NAD, VSS,. Resp: breathing on RA, no respiratory distress  Cardiac: rate normal    Risk / Benefits explained to patient, patient agree to proceed with plan.   ASA 3  MP 3

## 2022-06-20 NOTE — OP NOTE
Preoperative Diagnosis: Cervical spondylosis and chronic cervicalgia  Postoperative Diagnosis: Cervical spondylosis and chronic cervicalgia    Procedure Performed:  Right Cervical Medical branch nerve Radiofrequency ablation at C3 / C4/ C5/ C6 nerves under fluoroscopy guidance    BLOOD LOSS: NONE    Procedure: The Patient was seen in the preop area, chart was reviewed, informed consent was obtained. Patient was taken to procedure room and was placed in prone position. Vital signs were monitored through out the  Procedure. A time out was completed. The site was prepped and draped in sterile manner. The target point was identified with fluoro guidance using ipsilateral views. Skin and deep tissues were anesthetized with 1 % lidocaine. A  2o-gauge RF needle was advanced under fluoroscopy guidance to the targets until a bony contact was made. Position was conformed and adjusted in AP and lateral views. The Nerve testing was performed at each level using sensory stimulation at 50 HZ and motor stimulation at 2 HZ. No arm contraction was noticed, some multifidus contraction was noticed. Impedance was wnl at each level. 0. 5 ml of 4% lidocaine was injected to anesthetize the nerves at each level prior to lesioning. Finally Radiofrequency lesions were made at 85 degrees C for 90 sec. The needle was removed and a Band-Aid was placed over the needle  insertion site. The patient's vital signs remained stable and the patient tolerated the procedure well. SEDATION NOTE:    ASA CLASSIFICATION  3  MP   CLASSIFICATION  3    Moderate intravenous conscious sedation was supervised by Dr. Luciano Carlson  The patient was independently monitored by a Registered Nurse assigned to the Procedure Room  Monitoring included automated blood pressure, continuous EKG, Capnography and continuous pulse oximetry. The detailed Conscious Record is permanently stored in the [place] [id number].      The following is the conscious sedation record;  Start Time:  0923  End times:  0952  Duration:  29 MINUTES  MEDS GIVEN 1 MG VERSED AND 50 MCG FENTANYL

## 2022-07-19 ENCOUNTER — HOSPITAL ENCOUNTER (OUTPATIENT)
Dept: PAIN MANAGEMENT | Age: 82
Discharge: HOME | End: 2022-07-19
Admitting: NURSE PRACTITIONER
Payer: MEDICARE

## 2022-07-19 VITALS
RESPIRATION RATE: 16 BRPM | WEIGHT: 141 LBS | DIASTOLIC BLOOD PRESSURE: 45 MMHG | TEMPERATURE: 97.3 F | HEIGHT: 62 IN | BODY MASS INDEX: 25.95 KG/M2 | SYSTOLIC BLOOD PRESSURE: 128 MMHG | OXYGEN SATURATION: 97 % | HEART RATE: 66 BPM

## 2022-07-19 DIAGNOSIS — M48.062 SPINAL STENOSIS OF LUMBAR REGION WITH NEUROGENIC CLAUDICATION: Primary | ICD-10-CM

## 2022-07-19 DIAGNOSIS — M47.812 CERVICAL SPONDYLOSIS WITHOUT MYELOPATHY: ICD-10-CM

## 2022-07-19 DIAGNOSIS — M54.2 CERVICALGIA: ICD-10-CM

## 2022-07-19 PROCEDURE — 99213 OFFICE O/P EST LOW 20 MIN: CPT

## 2022-07-19 PROCEDURE — 99213 OFFICE O/P EST LOW 20 MIN: CPT | Performed by: NURSE PRACTITIONER

## 2022-07-19 ASSESSMENT — ENCOUNTER SYMPTOMS
COUGH: 0
BACK PAIN: 1
SHORTNESS OF BREATH: 0
CONSTIPATION: 0

## 2022-07-19 NOTE — PROGRESS NOTES
Chief Complaint   Patient presents with    Neck Pain    Follow Up After Procedure     RFA         [facility]   Pt is here for follow up after cervical RFA. She reports 10% relief following the procedure. She complains of continued pain in the occipital region that radiates up into her head and behind her right eye. Rates the pain \"12/10 at times\". She is also complaining of low back pain. MRI with multilevel degenerative disc disease. She has had LESI and facet injections with some relief. Neck Pain   This is a chronic problem. The current episode started more than 1 year ago. The problem occurs constantly. The problem has been unchanged. The pain is associated with nothing. The pain is present in the left side, midline and right side. The quality of the pain is described as burning. The pain is at a severity of 7/10. The symptoms are aggravated by bending, position and twisting. Associated symptoms include headaches. Pertinent negatives include no chest pain or fever. Treatments tried: RFA. Back Pain  This is a chronic problem. The current episode started more than 1 year ago. The problem has been gradually worsening since onset. The pain is present in the lumbar spine. The quality of the pain is described as aching. Radiates to: down both legs to the feet. The pain is at a severity of 6/10. The pain is moderate. The symptoms are aggravated by position and standing (walking). Associated symptoms include headaches. Pertinent negatives include no chest pain or fever. She has tried analgesics and bed rest for the symptoms. The treatment provided mild relief. Patient denies any new neurological symptoms. No bowel or bladder incontinence, no weakness, and no falling.       Past Medical History:   Diagnosis Date    Arthritis     Diabetes mellitus ([address]     High cholesterol     Migraines     Vision abnormalities        Past Surgical History:   Procedure Laterality Date    APPENDECTOMY  1949 CHOLECYSTECTOMY  1971    COLONOSCOPY  10/12/07    HYSTERECTOMY (CERVIX STATUS UNKNOWN)  1983    Ovaries removed    ORIF HUMERUS DECOMPRESSION Left 2010    PAIN MANAGEMENT PROCEDURE      SALPINGO-OOPHORECTOMY  1983    TONSILLECTOMY AND ADENOIDECTOMY  1962       Allergies   Allergen Reactions    Aspirin      Intolerance. Upsets stomach    Duricef [Cefadroxil] Hives    Lipitor [Atorvastatin Calcium]      Muscle pain    Macrobid [Nitrofurantoin Monohyd Macro] Hives         Current Outpatient Medications:     gabapentin (NEURONTIN) 100 MG capsule, Take 100 mg by mouth 3 times daily. , Disp: , Rfl:     ferrous sulfate (IRON 325) 325 (65 Fe) MG tablet, Take 325 mg by mouth daily (with breakfast) (Patient not taking: No sig reported), Disp: , Rfl:     levothyroxine (SYNTHROID) 50 MCG tablet, Take 50 mcg by mouth Daily, Disp: , Rfl:     acetaminophen (TYLENOL) 500 MG tablet, Take 500 mg by mouth every 6 hours as needed for Pain, Disp: , Rfl:     tiZANidine (ZANAFLEX) 2 MG tablet, Take 1 tablet by mouth every 8 hours as needed (muscle spasms) (Patient not taking: No sig reported), Disp: 90 tablet, Rfl: 0    Multiple Vitamins-Minerals (ICAPS AREDS 2 PO), Take by mouth, Disp: , Rfl:     guaiFENesin (MUCINEX) 600 MG extended release tablet, Take 1,200 mg by mouth 2 times daily , Disp: , Rfl:     carvedilol (COREG) 25 MG tablet, Take 25 mg by mouth 2 times daily (with meals), Disp: , Rfl:     benazepril (LOTENSIN) 10 MG tablet, Take 10 mg by mouth daily, Disp: , Rfl:     traMADol (ULTRAM) 50 MG tablet, Take 50 mg by mouth every 6 hours as needed for Pain., Disp: , Rfl:     simvastatin (ZOCOR) 40 MG tablet, Take 40 mg by mouth nightly, Disp: , Rfl:     celecoxib (CELEBREX) 200 MG capsule, Take 200 mg by mouth daily. , Disp: , Rfl:     metFORMIN (GLUCOPHAGE) 500 MG tablet, Take 500 mg by mouth daily (with breakfast). , Disp: , Rfl:     omeprazole (PRILOSEC) 20 MG capsule, Take 20 mg by mouth daily. , Disp: , Rfl:     Multiple Vitamins-Minerals (PX SENIOR VITAMIN PO), Take  by mouth., Disp: , Rfl:     Calcium Carbonate-Vitamin D 600-400 MG-UNIT CHEW, Take  by mouth 2 times daily. , Disp: , Rfl:     Family History   Problem Relation Age of Onset    Breast Cancer Paternal Cousin         >48 yo    Cancer Neg Hx     Colon Cancer Neg Hx     Diabetes Neg Hx     Eclampsia Neg Hx     Hypertension Neg Hx     Ovarian Cancer Neg Hx      Labor Neg Hx     Spont Abortions Neg Hx     Stroke Neg Hx        Social History     Socioeconomic History    Marital status:      Spouse name: Not on file    Number of children: Not on file    Years of education: Not on file    Highest education level: Not on file   Occupational History    Not on file   Tobacco Use    Smoking status: Never    Smokeless tobacco: Never   Vaping Use    Vaping Use: Never used   Substance and Sexual Activity    Alcohol use: No     Alcohol/week: 0.0 standard drinks    Drug use: No    Sexual activity: Yes     Partners: Male     Birth control/protection: Surgical, Post-menopausal     Comment: hysterectomy BSO   Other Topics Concern    Not on file   Social History Narrative    Not on file     Social Determinants of Health     Financial Resource Strain: Not on file   Food Insecurity: Not on file   Transportation Needs: Not on file   Physical Activity: Not on file   Stress: Not on file   Social Connections: Not on file   Intimate Partner Violence: Not on file   Housing Stability: Not on file       Review of Systems:  Review of Systems   Constitutional: Negative for chills and fever. Cardiovascular:  Negative for chest pain and palpitations. Respiratory:  Negative for cough and shortness of breath. Musculoskeletal:  Positive for back pain and neck pain. Gastrointestinal:  Negative for constipation. Neurological:  Positive for headaches. Negative for disturbances in coordination and loss of balance.      Physical Exam:  BP (!) 128/45   Pulse 66   Temp 97.3 °F (36.3 °C) Resp 16   Ht 5' 2\" (1.575 m)   Wt 141 lb (64 kg)   LMP  (LMP Unknown)   SpO2 97%   BMI 25.79 kg/m²     Physical Exam  HENT:      Head: Normocephalic. Pulmonary:      Effort: Pulmonary effort is normal.   Musculoskeletal:         General: Normal range of motion. Cervical back: Normal range of motion. Tenderness present. Lumbar back: Tenderness present. Skin:     General: Skin is warm and dry. Neurological:      Mental Status: She is alert and oriented to person, place, and time. Record/Diagnostics Review:    FINDINGS:     4 views of the cervical spine were obtained. There is loss of intervertebral disc height throughout the cervical region greatest at C5-C6 and C6-C7. Extensive facet degenerative changes are present. There is no focal vertebral deformity or malalignment. Prevertebral contours are within normal   limits. IMPRESSION:     Cervical degenerative changes with no focal abnormality evidentradiographically. Assessment:  Problem List Items Addressed This Visit       Spinal stenosis of lumbar region with neurogenic claudication - Primary (Chronic)    Cervical spondylosis without myelopathy (Chronic)     Other Visit Diagnoses       Cervicalgia        Relevant Orders    MRI CERVICAL SPINE WO CONTRAST               Treatment Plan:  No relief following cervical RFA  Has failed conservative measures, including injections and the pain is worsening, I do believe an MRI of the cervical spine is indicated to further evaluate pathology and guide treatment plan. Consider injection therapies versus surgical evaluation based on imaging results. I have reviewed the chief complaint and history of present illness (including ROS and PFSH) and vital documentation by my staff and I agree with their documentation and have added where applicable.

## 2022-07-29 ENCOUNTER — HOSPITAL ENCOUNTER (OUTPATIENT)
Dept: MRI IMAGING | Age: 82
Discharge: HOME | End: 2022-07-31
Payer: MEDICARE

## 2022-07-29 DIAGNOSIS — M54.2 CERVICALGIA: ICD-10-CM

## 2022-07-29 PROCEDURE — 72141 MRI NECK SPINE W/O DYE: CPT

## 2022-08-03 ENCOUNTER — HOSPITAL ENCOUNTER (OUTPATIENT)
Age: 82
Discharge: HOME | End: 2022-08-05
Payer: MEDICARE

## 2022-08-03 ENCOUNTER — HOSPITAL ENCOUNTER (OUTPATIENT)
Dept: GENERAL RADIOLOGY | Age: 82
Discharge: HOME | End: 2022-08-05
Payer: MEDICARE

## 2022-08-03 ENCOUNTER — HOSPITAL ENCOUNTER (OUTPATIENT)
Dept: PAIN MANAGEMENT | Age: 82
Discharge: HOME | End: 2022-08-03
Payer: MEDICARE

## 2022-08-03 VITALS
OXYGEN SATURATION: 99 % | HEART RATE: 64 BPM | DIASTOLIC BLOOD PRESSURE: 56 MMHG | TEMPERATURE: 97.1 F | SYSTOLIC BLOOD PRESSURE: 140 MMHG

## 2022-08-03 DIAGNOSIS — M47.812 CERVICAL SPONDYLOSIS WITHOUT MYELOPATHY: ICD-10-CM

## 2022-08-03 DIAGNOSIS — M54.2 CERVICALGIA: ICD-10-CM

## 2022-08-03 DIAGNOSIS — M48.062 SPINAL STENOSIS OF LUMBAR REGION WITH NEUROGENIC CLAUDICATION: Primary | ICD-10-CM

## 2022-08-03 PROCEDURE — 99213 OFFICE O/P EST LOW 20 MIN: CPT

## 2022-08-03 PROCEDURE — 72040 X-RAY EXAM NECK SPINE 2-3 VW: CPT

## 2022-08-03 PROCEDURE — 99213 OFFICE O/P EST LOW 20 MIN: CPT | Performed by: NURSE PRACTITIONER

## 2022-08-03 ASSESSMENT — ENCOUNTER SYMPTOMS
CONSTIPATION: 0
COUGH: 0
SHORTNESS OF BREATH: 0
TROUBLE SWALLOWING: 0

## 2022-08-03 ASSESSMENT — PAIN DESCRIPTION - FREQUENCY: FREQUENCY: CONTINUOUS

## 2022-08-03 ASSESSMENT — PAIN SCALES - GENERAL: PAINLEVEL_OUTOF10: 8

## 2022-08-03 ASSESSMENT — PAIN DESCRIPTION - LOCATION: LOCATION: NECK

## 2022-08-03 ASSESSMENT — PAIN DESCRIPTION - DIRECTION: RADIATING_TOWARDS: DOES NOT RADIATE

## 2022-08-03 ASSESSMENT — PAIN DESCRIPTION - ORIENTATION: ORIENTATION: RIGHT;UPPER

## 2022-08-03 ASSESSMENT — PAIN DESCRIPTION - PAIN TYPE: TYPE: CHRONIC PAIN

## 2022-08-03 ASSESSMENT — PAIN DESCRIPTION - DESCRIPTORS: DESCRIPTORS: SHOOTING;STABBING

## 2022-08-24 ENCOUNTER — TELEPHONE (OUTPATIENT)
Dept: PAIN MANAGEMENT | Age: 82
End: 2022-08-24

## 2022-09-15 ENCOUNTER — OFFICE VISIT (OUTPATIENT)
Dept: NEUROSURGERY | Age: 82
End: 2022-09-15
Payer: MEDICARE

## 2022-09-15 VITALS
OXYGEN SATURATION: 94 % | HEART RATE: 56 BPM | RESPIRATION RATE: 18 BRPM | DIASTOLIC BLOOD PRESSURE: 69 MMHG | WEIGHT: 137 LBS | HEIGHT: 62 IN | BODY MASS INDEX: 25.21 KG/M2 | SYSTOLIC BLOOD PRESSURE: 135 MMHG | TEMPERATURE: 98.3 F

## 2022-09-15 DIAGNOSIS — G43.909 MIGRAINE WITHOUT STATUS MIGRAINOSUS, NOT INTRACTABLE, UNSPECIFIED MIGRAINE TYPE: ICD-10-CM

## 2022-09-15 DIAGNOSIS — M47.812 CERVICAL SPONDYLOSIS WITHOUT MYELOPATHY: Primary | ICD-10-CM

## 2022-09-15 PROCEDURE — 1123F ACP DISCUSS/DSCN MKR DOCD: CPT | Performed by: NURSE PRACTITIONER

## 2022-09-15 PROCEDURE — 99204 OFFICE O/P NEW MOD 45 MIN: CPT | Performed by: NURSE PRACTITIONER

## 2022-09-15 NOTE — PROGRESS NOTES
[address]  [facility] [address] [facility] [address] [facility] [address]  Dept: [phone]    Patient: Christal Gutiérrez  YOB: 1940  Date: 9/15/22    The patient is a 78 y.o. female who presents today for consult of the following problems:     Chief Complaint   Patient presents with    New Patient         HPI:     Christal Gutiérerz is a 78 y.o. female on whom neurosurgical consultation was requested by Delphine Perez DO for management of neck pain and headaches. Started in December, thought she had a stiff neck. Underwent physical therapy as well as pain management interventions. RFA with minimal benefit. Pain is 7-8/10 on average. Pain is slightly better while sleeping/lying down, but never fully abates. Pain radiates into right scalp and forehead. Does not affect lower face/jaw. Not worsened with chewing/brushing teeth. No clear issues with dexterity/dropping objects. [facility] physical therapy.     History:     Past Medical History:   Diagnosis Date    Arthritis     Diabetes mellitus ([address]     High cholesterol     Migraines     Vision abnormalities      Past Surgical History:   Procedure Laterality Date    APPENDECTOMY      CHOLECYSTECTOMY      COLONOSCOPY  10/12/07    HYSTERECTOMY (CERVIX STATUS UNKNOWN)  1983    Ovaries removed    ORIF HUMERUS DECOMPRESSION Left     PAIN MANAGEMENT PROCEDURE      SALPINGO-OOPHORECTOMY  1983    TONSILLECTOMY AND ADENOIDECTOMY       Family History   Problem Relation Age of Onset    Breast Cancer Paternal Cousin         >48 yo    Cancer Neg Hx     Colon Cancer Neg Hx     Diabetes Neg Hx     Eclampsia Neg Hx     Hypertension Neg Hx     Ovarian Cancer Neg Hx      Labor Neg Hx     Spont Abortions Neg Hx     Stroke Neg Hx      Current Outpatient Medications on File Prior to Visit   Medication Sig Dispense Refill    gabapentin (NEURONTIN) 100 MG capsule Take 100 mg by mouth 3 times daily. levothyroxine (SYNTHROID) 50 MCG tablet Take 50 mcg by mouth Daily      acetaminophen (TYLENOL) 500 MG tablet Take 500 mg by mouth every 6 hours as needed for Pain      Multiple Vitamins-Minerals (ICAPS AREDS 2 PO) Take by mouth      carvedilol (COREG) 25 MG tablet Take 25 mg by mouth 2 times daily (with meals)      benazepril (LOTENSIN) 10 MG tablet Take 10 mg by mouth daily      traMADol (ULTRAM) 50 MG tablet Take 50 mg by mouth every 6 hours as needed for Pain.      simvastatin (ZOCOR) 40 MG tablet Take 40 mg by mouth nightly      celecoxib (CELEBREX) 200 MG capsule Take 200 mg by mouth daily. metFORMIN (GLUCOPHAGE) 500 MG tablet Take 500 mg by mouth daily (with breakfast). omeprazole (PRILOSEC) 20 MG capsule Take 20 mg by mouth daily. Multiple Vitamins-Minerals (PX SENIOR VITAMIN PO) Take  by mouth. Calcium Carbonate-Vitamin D 600-400 MG-UNIT CHEW Take  by mouth 2 times daily. ferrous sulfate (IRON 325) 325 (65 Fe) MG tablet Take 325 mg by mouth daily (with breakfast) (Patient not taking: No sig reported)      tiZANidine (ZANAFLEX) 2 MG tablet Take 1 tablet by mouth every 8 hours as needed (muscle spasms) (Patient not taking: No sig reported) 90 tablet 0    guaiFENesin (MUCINEX) 600 MG extended release tablet Take 1,200 mg by mouth 2 times daily  (Patient not taking: Reported on 9/15/2022)       No current facility-administered medications on file prior to visit. Social History     Tobacco Use    Smoking status: Never    Smokeless tobacco: Never   Vaping Use    Vaping Use: Never used   Substance Use Topics    Alcohol use: No     Alcohol/week: 0.0 standard drinks    Drug use: No       Allergies   Allergen Reactions    Aspirin      Intolerance.  Upsets stomach    Duricef [Cefadroxil] Hives    Lipitor [Atorvastatin Calcium]      Muscle pain    Macrobid [Nitrofurantoin Monohyd Macro] Hives       Review of 5/5; R wrist extension 5/5  L intrinsics 5/5; R intrinsics 5/5     L iliopsoas 5/5 , R iliopsoas 5/5  L quadriceps 5/5; R quadriceps 5/5  L Dorsiflexion 5/5; R dorsiflexion 5/5  L Plantarflexion 5/5; R plantarflexion 5/5  L EHL 5/5; R EHL 5/5    Reflexes  L Brachioradialis 2+/4; R brachioradialis 2+/4  L Biceps 2+/4; R Biceps 2+/4  L Triceps 2+/4; R Triceps 2+/4  L Patellar 2+/4: R Patellar 2+/4  L Achilles 2+/4; R Achilles 2+/4    hoffmans L: neg  hoffmans R: neg  Clonus L: neg  Clonus R: neg  Babinski L: neg  Babinski R: neg    Studies Review:     MRI cervical 7/9/2022:  FINDINGS:   BONES/ALIGNMENT: There is straightening of the normal cervical lordosis. No   significant listhesis. Vertebral body heights are maintained. There is   minimal edema in the dens, likely degenerative. There is prominent pannus   formation at C1-2. SPINAL CORD: No abnormal cord signal is seen. SOFT TISSUES: No paraspinal mass identified. C2-C3: There is no significant disc protrusion, spinal canal stenosis or   neural foraminal narrowing. C3-C4: Posterior disc osteophyte complex ligamentum flavum hypertrophy with   mild spinal canal stenosis. Moderate bilateral neural foraminal narrowing. C4-C5: Posterior disc osteophyte complex without significant spinal canal   stenosis. Mild bilateral neural foramina. C5-C6: Posterior disc osteophyte complex and ligamentum flavum hypertrophy   with moderate spinal canal stenosis. Mild bilateral neural foraminal   narrowing. C6-C7: Posterior disc osteophyte complex and ligamentum flavum hypertrophy   with moderate spinal canal stenosis. Mild bilateral neural foraminal   narrowing. C7-T1: Posterior disc osteophyte complex with minimal spinal canal stenosis. Mild left neural foraminal narrowing. XR cervical 8/4/2022:  FINDINGS:   The vertebral body heights are maintained. There is multilevel degenerative   disc disease.   There is multilevel degenerative facet hypertrophy. There is   no spondylolisthesis. The prevertebral soft tissues are unremarkable. There   is no pathologic motion or instability. Assessment and Plan:      1. Cervical spondylosis without myelopathy    2. Migraine without status migrainosus, not intractable, unspecified migraine type          Plan: Persistent neck pain with associated headaches, scalp pain. Multilevel degenerative changes throughout cervical spine. Recommend obtaining CT cervical to evaluate for chronic C2 fracture. Follow up in [place] in the next 3-6 weeks following CT. Followup: Return in about 4 weeks (around 10/13/2022), or if symptoms worsen or fail to improve. Prescriptions Ordered:  No orders of the defined types were placed in this encounter. Orders Placed:  Orders Placed This Encounter   Procedures    CT CERVICAL SPINE WO CONTRAST     Standing Status:   Future     Standing Expiration Date:   12/14/2022     Order Specific Question:   Reason for exam:     Answer:   eval for chronic C2 fracture        Electronically signed by BRAULIO Negron CNP on 9/21/2022 at 2:33 PM    Please note that this chart was generated using voice recognition Dragon dictation software. Although every effort was made to ensure the accuracy of this automated transcription, some errors in transcription may have occurred.

## 2022-09-21 ENCOUNTER — HOSPITAL ENCOUNTER (OUTPATIENT)
Dept: CT IMAGING | Age: 82
Discharge: HOME | End: 2022-09-23
Payer: MEDICARE

## 2022-09-21 DIAGNOSIS — M47.812 CERVICAL SPONDYLOSIS WITHOUT MYELOPATHY: ICD-10-CM

## 2022-09-21 PROCEDURE — 72125 CT NECK SPINE W/O DYE: CPT

## 2022-09-22 ENCOUNTER — HOSPITAL ENCOUNTER (OUTPATIENT)
Dept: PAIN MANAGEMENT | Age: 82
Discharge: HOME | End: 2022-09-22
Payer: MEDICARE

## 2022-09-22 VITALS
WEIGHT: 137 LBS | TEMPERATURE: 97.3 F | OXYGEN SATURATION: 99 % | RESPIRATION RATE: 20 BRPM | HEART RATE: 97 BPM | BODY MASS INDEX: 25.21 KG/M2 | DIASTOLIC BLOOD PRESSURE: 66 MMHG | HEIGHT: 62 IN | SYSTOLIC BLOOD PRESSURE: 114 MMHG

## 2022-09-22 DIAGNOSIS — M48.062 SPINAL STENOSIS OF LUMBAR REGION WITH NEUROGENIC CLAUDICATION: Chronic | ICD-10-CM

## 2022-09-22 DIAGNOSIS — M48.02 CERVICAL SPINAL STENOSIS: ICD-10-CM

## 2022-09-22 DIAGNOSIS — M47.817 LUMBOSACRAL SPONDYLOSIS WITHOUT MYELOPATHY: ICD-10-CM

## 2022-09-22 DIAGNOSIS — M47.812 CERVICAL SPONDYLOSIS WITHOUT MYELOPATHY: Primary | Chronic | ICD-10-CM

## 2022-09-22 PROCEDURE — 99214 OFFICE O/P EST MOD 30 MIN: CPT | Performed by: ANESTHESIOLOGY

## 2022-09-22 PROCEDURE — 99213 OFFICE O/P EST LOW 20 MIN: CPT

## 2022-09-22 ASSESSMENT — ENCOUNTER SYMPTOMS
DIARRHEA: 0
VOMITING: 0
CONSTIPATION: 0
NAUSEA: 0
SHORTNESS OF BREATH: 0

## 2022-09-22 NOTE — PROGRESS NOTES
Pt is seeing Javier Bains MD @ [facility] The patient is a 78 y. o. Non- / non  female. Chief Complaint   Patient presents with    Neck Pain        HPI  Patient is here today for: Neck pain  Pain level: 3  Character: aching and stabbing  Radiating: yes  Weakness or numbness: no  Aggravating Factors: holding up head  Alleviating Factors: laying down  Constant or intermitting: constant      Past Medical History:   Diagnosis Date    Arthritis     Diabetes mellitus ([address]     High cholesterol     Migraines     Vision abnormalities         Past Surgical History:   Procedure Laterality Date    APPENDECTOMY      CHOLECYSTECTOMY      COLONOSCOPY  10/12/07    HYSTERECTOMY (CERVIX STATUS UNKNOWN)  1983    Ovaries removed    ORIF HUMERUS DECOMPRESSION Left 2010    PAIN MANAGEMENT PROCEDURE      SALPINGO-OOPHORECTOMY      TONSILLECTOMY AND ADENOIDECTOMY         Social History     Socioeconomic History    Marital status:      Spouse name: None    Number of children: None    Years of education: None    Highest education level: None   Tobacco Use    Smoking status: Never    Smokeless tobacco: Never   Vaping Use    Vaping Use: Never used   Substance and Sexual Activity    Alcohol use: No     Alcohol/week: 0.0 standard drinks    Drug use: No    Sexual activity: Yes     Partners: Male     Birth control/protection: Surgical, Post-menopausal     Comment: hysterectomy BSO       Family History   Problem Relation Age of Onset    Breast Cancer Paternal Cousin         >46 yo    Cancer Neg Hx     Colon Cancer Neg Hx     Diabetes Neg Hx     Eclampsia Neg Hx     Hypertension Neg Hx     Ovarian Cancer Neg Hx      Labor Neg Hx     Spont Abortions Neg Hx     Stroke Neg Hx        Allergies   Allergen Reactions    Aspirin      Intolerance. Upsets stomach    Duricef [Cefadroxil] Hives    Lipitor [Atorvastatin Calcium]      Muscle pain    Macrobid [Nitrofurantoin Monohyd Macro] Hives       There were no vitals filed for this visit.     Current Outpatient Medications   Medication Sig Dispense Refill    gabapentin (NEURONTIN) 100 MG capsule Take 100 mg by mouth 3 times daily. ferrous sulfate (IRON 325) 325 (65 Fe) MG tablet Take 325 mg by mouth daily (with breakfast) (Patient not taking: No sig reported)      levothyroxine (SYNTHROID) 50 MCG tablet Take 50 mcg by mouth Daily      acetaminophen (TYLENOL) 500 MG tablet Take 500 mg by mouth every 6 hours as needed for Pain      tiZANidine (ZANAFLEX) 2 MG tablet Take 1 tablet by mouth every 8 hours as needed (muscle spasms) (Patient not taking: No sig reported) 90 tablet 0    Multiple Vitamins-Minerals (ICAPS AREDS 2 PO) Take by mouth      guaiFENesin (MUCINEX) 600 MG extended release tablet Take 1,200 mg by mouth 2 times daily  (Patient not taking: Reported on 9/15/2022)      carvedilol (COREG) 25 MG tablet Take 25 mg by mouth 2 times daily (with meals)      benazepril (LOTENSIN) 10 MG tablet Take 10 mg by mouth daily      traMADol (ULTRAM) 50 MG tablet Take 50 mg by mouth every 6 hours as needed for Pain.      simvastatin (ZOCOR) 40 MG tablet Take 40 mg by mouth nightly      celecoxib (CELEBREX) 200 MG capsule Take 200 mg by mouth daily. metFORMIN (GLUCOPHAGE) 500 MG tablet Take 500 mg by mouth daily (with breakfast). omeprazole (PRILOSEC) 20 MG capsule Take 20 mg by mouth daily. Multiple Vitamins-Minerals (PX SENIOR VITAMIN PO) Take  by mouth. Calcium Carbonate-Vitamin D 600-400 MG-UNIT CHEW Take  by mouth 2 times daily. No current facility-administered medications for this encounter. Review of Systems   Constitutional:  Negative for chills, fatigue and fever. Respiratory:  Negative for shortness of breath. Cardiovascular:  Negative for chest pain and palpitations. Gastrointestinal:  Negative for constipation, diarrhea, nausea and vomiting. Musculoskeletal:  Positive for neck pain. Neurological:  Positive for headaches.  Negative for dizziness, weakness and numbness. Objective:  Vital signs: (most recent): not currently breastfeeding. No fever. Assessment & Plan  No diagnosis found. No orders of the defined types were placed in this encounter. No orders of the defined types were placed in this encounter.            Electronically signed by Nolan Santos MA on 9/22/2022 at 11:36 AM

## 2022-09-22 NOTE — PROGRESS NOTES
The patient is a 78 y. o. Non- / non  female.     Chief Complaint   Patient presents with    Neck Pain        HPI    24-year-old female with history of chronic neck and low back pain    Neck pain  Predominantly axial right-sided neck pain extends over the trapezius to the shoulder and extends up along the spine to the right side head causing headaches  Aggravated with routine activities  Was diagnosed with cervical spondylosis responded well to diagnostic cervical blocker  Status post radiofrequency ablation 3 months out of the procedure  Patient report no significant change in pain with radiofrequency ablation  Had recent MRI cervical spine that showed moderate cervical spinal stenosis  Was recently evaluated by neurosurgery and is not advised for any surgery  I will recommend for a cervical epidural steroid injection    Back pain  Chronic going on for many years  Reports radiation down leg predominantly left side that comes with standing and walking and alleviates with rest  Had MRI lumbar spine about 2-1/2 years ago that reported moderate spinal stenosis  She has done multiple courses of physical therapy in past  NSAIDs contraindicated because of advanced age and renal issues  Will obtain MRI lumbar spine    Will consider for appropriate intervention in future if needed      Patient is here today for: Neck pain  Pain level: 3  Character: aching and stabbing  Radiating: yes  Weakness or numbness: no  Aggravating Factors: holding up head  Alleviating Factors: laying down  Constant or intermitting: constant      Past Medical History:   Diagnosis Date    Arthritis     Diabetes mellitus ([address]     High cholesterol     Migraines     Vision abnormalities         Past Surgical History:   Procedure Laterality Date    [facility]    COLONOSCOPY  10/12/07    HYSTERECTOMY (CERVIX STATUS UNKNOWN)  1983    Ovaries removed    ORIF HUMERUS DECOMPRESSION Left 2010    PAIN MANAGEMENT PROCEDURE      SALPINGO-OOPHORECTOMY      TONSILLECTOMY AND ADENOIDECTOMY  89       Social History     Socioeconomic History    Marital status:      Spouse name: None    Number of children: None    Years of education: None    Highest education level: None   Tobacco Use    Smoking status: Never    Smokeless tobacco: Never   Vaping Use    Vaping Use: Never used   Substance and Sexual Activity    Alcohol use: No     Alcohol/week: 0.0 standard drinks    Drug use: No    Sexual activity: Yes     Partners: Male     Birth control/protection: Surgical, Post-menopausal     Comment: hysterectomy BSO       Family History   Problem Relation Age of Onset    Breast Cancer Paternal Cousin         >48 yo    Cancer Neg Hx     Colon Cancer Neg Hx     Diabetes Neg Hx     Eclampsia Neg Hx     Hypertension Neg Hx     Ovarian Cancer Neg Hx      Labor Neg Hx     Spont Abortions Neg Hx     Stroke Neg Hx        Allergies   Allergen Reactions    Aspirin      Intolerance. Upsets stomach    Duricef [Cefadroxil] Hives    Lipitor [Atorvastatin Calcium]      Muscle pain    Macrobid [Nitrofurantoin Monohyd Macro] Hives       Vitals:    22 1145   BP: 114/66   Pulse: 97   Resp: 20   Temp: 97.3 °F (36.3 °C)   SpO2: 99%       Current Outpatient Medications   Medication Sig Dispense Refill    gabapentin (NEURONTIN) 100 MG capsule Take 100 mg by mouth 3 times daily.       ferrous sulfate (IRON 325) 325 (65 Fe) MG tablet Take 325 mg by mouth daily (with breakfast) (Patient not taking: No sig reported)      levothyroxine (SYNTHROID) 50 MCG tablet Take 50 mcg by mouth Daily      acetaminophen (TYLENOL) 500 MG tablet Take 500 mg by mouth every 6 hours as needed for Pain      tiZANidine (ZANAFLEX) 2 MG tablet Take 1 tablet by mouth every 8 hours as needed (muscle spasms) 90 tablet 0    Multiple Vitamins-Minerals (ICAPS AREDS 2 PO) Take by mouth      guaiFENesin (MUCINEX) 600 MG extended release tablet Take 1,200 mg by mouth 2 times daily      carvedilol (COREG) 25 MG tablet Take 25 mg by mouth 2 times daily (with meals)      benazepril (LOTENSIN) 10 MG tablet Take 10 mg by mouth daily      traMADol (ULTRAM) 50 MG tablet Take 50 mg by mouth every 6 hours as needed for Pain.      simvastatin (ZOCOR) 40 MG tablet Take 40 mg by mouth nightly      celecoxib (CELEBREX) 200 MG capsule Take 200 mg by mouth daily. metFORMIN (GLUCOPHAGE) 500 MG tablet Take 500 mg by mouth daily (with breakfast). omeprazole (PRILOSEC) 20 MG capsule Take 20 mg by mouth daily. Multiple Vitamins-Minerals (PX SENIOR VITAMIN PO) Take  by mouth. Calcium Carbonate-Vitamin D 600-400 MG-UNIT CHEW Take  by mouth 2 times daily. No current facility-administered medications for this encounter. Review of Systems   Constitutional:  Negative for chills, fatigue and fever. Respiratory:  Negative for shortness of breath. Cardiovascular:  Negative for chest pain and palpitations. Gastrointestinal:  Negative for constipation, diarrhea, nausea and vomiting. Musculoskeletal:  Positive for neck pain. Neurological:  Positive for headaches. Negative for dizziness, weakness and numbness. Objective:  General Appearance:  Well-appearing, in no acute distress, uncomfortable and in pain. Vital signs: (most recent): Blood pressure 114/66, pulse 97, temperature 97.3 °F (36.3 °C), resp. rate 20, height 5' 2\" (1.575 m), weight 137 lb (62.1 kg), SpO2 99 %, not currently breastfeeding. Vital signs are normal.  No fever. Output: Producing urine and producing stool. HEENT: Normal HEENT exam.    Lungs:  Normal effort and normal respiratory rate. She is not in respiratory distress. Heart: Normal rate. Extremities: Normal range of motion. There is no deformity. Neurological: Patient is alert and oriented to person, place and time. Patient has normal coordination. Pupils:  Pupils are equal, round, and reactive to light.   Pupils are equal. Skin:  Warm and dry. No rash or cyanosis. Assessment & Plan  20-year-old female with history of chronic neck and low back pain    Neck pain  Predominantly axial right-sided neck pain extends over the trapezius to the shoulder and extends up along the spine to the right side head causing headaches  Aggravated with routine activities  Was diagnosed with cervical spondylosis responded well to diagnostic cervical blocker  Status post radiofrequency ablation 3 months out of the procedure  Patient report no significant change in pain with radiofrequency ablation  Had recent MRI cervical spine that showed moderate cervical spinal stenosis  Was recently evaluated by neurosurgery and is not advised for any surgery  I will recommend for a cervical epidural steroid injection    Back pain  Chronic going on for many years  Reports radiation down leg predominantly left side that comes with standing and walking and alleviates with rest  Had MRI lumbar spine about 2-1/2 years ago that reported moderate spinal stenosis  She has done multiple courses of physical therapy in past  NSAIDs contraindicated because of advanced age and renal issues  Will obtain MRI lumbar spine    Will consider for appropriate intervention in future if needed      1. Cervical spondylosis without myelopathy    2. Lumbosacral spondylosis without myelopathy    3. Spinal stenosis of lumbar region with neurogenic claudication    4. Cervical spinal stenosis        Orders Placed This Encounter   Procedures    MRI LUMBAR SPINE WO CONTRAST    VA NJX DX/THER SBST INTRLMNR CRV/THRC W/IMG GDN      No orders of the defined types were placed in this encounter.            Electronically signed by Lillian Seay MD on 9/22/2022 at 12:18 PM

## 2022-10-07 ENCOUNTER — HOSPITAL ENCOUNTER (OUTPATIENT)
Dept: MRI IMAGING | Age: 82
Discharge: HOME | End: 2022-10-09
Payer: MEDICARE

## 2022-10-07 DIAGNOSIS — M48.062 SPINAL STENOSIS OF LUMBAR REGION WITH NEUROGENIC CLAUDICATION: Chronic | ICD-10-CM

## 2022-10-07 DIAGNOSIS — M47.817 LUMBOSACRAL SPONDYLOSIS WITHOUT MYELOPATHY: ICD-10-CM

## 2022-10-07 PROCEDURE — 72148 MRI LUMBAR SPINE W/O DYE: CPT

## 2022-10-17 ENCOUNTER — HOSPITAL ENCOUNTER (OUTPATIENT)
Dept: PAIN MANAGEMENT | Age: 82
Discharge: HOME | End: 2022-10-17
Payer: MEDICARE

## 2022-10-17 ENCOUNTER — HOSPITAL ENCOUNTER (OUTPATIENT)
Dept: GENERAL RADIOLOGY | Age: 82
Discharge: HOME | End: 2022-10-19
Payer: MEDICARE

## 2022-10-17 VITALS
DIASTOLIC BLOOD PRESSURE: 59 MMHG | HEIGHT: 62 IN | RESPIRATION RATE: 16 BRPM | TEMPERATURE: 97 F | SYSTOLIC BLOOD PRESSURE: 165 MMHG | BODY MASS INDEX: 25.21 KG/M2 | WEIGHT: 137 LBS | HEART RATE: 53 BPM | OXYGEN SATURATION: 98 %

## 2022-10-17 DIAGNOSIS — M47.812 CERVICAL SPONDYLOSIS WITHOUT MYELOPATHY: Primary | Chronic | ICD-10-CM

## 2022-10-17 DIAGNOSIS — R52 PAIN MANAGEMENT: ICD-10-CM

## 2022-10-17 PROCEDURE — 62321 NJX INTERLAMINAR CRV/THRC: CPT | Performed by: ANESTHESIOLOGY

## 2022-10-17 PROCEDURE — 6360000004 HC RX CONTRAST MEDICATION: Performed by: ANESTHESIOLOGY

## 2022-10-17 PROCEDURE — 62321 NJX INTERLAMINAR CRV/THRC: CPT

## 2022-10-17 PROCEDURE — 6360000002 HC RX W HCPCS: Performed by: ANESTHESIOLOGY

## 2022-10-17 PROCEDURE — 99152 MOD SED SAME PHYS/QHP 5/>YRS: CPT | Performed by: ANESTHESIOLOGY

## 2022-10-17 PROCEDURE — 2500000003 HC RX 250 WO HCPCS: Performed by: ANESTHESIOLOGY

## 2022-10-17 PROCEDURE — 3209999900 FLUORO FOR SURGICAL PROCEDURES

## 2022-10-17 PROCEDURE — 2580000003 HC RX 258: Performed by: ANESTHESIOLOGY

## 2022-10-17 RX ORDER — 0.9 % SODIUM CHLORIDE 0.9 %
INTRAVENOUS SOLUTION INTRAVENOUS CONTINUOUS PRN
Status: COMPLETED | OUTPATIENT
Start: 2022-10-17 | End: 2022-10-17

## 2022-10-17 RX ORDER — MIDAZOLAM HYDROCHLORIDE 1 MG/ML
INJECTION INTRAMUSCULAR; INTRAVENOUS
Status: COMPLETED | OUTPATIENT
Start: 2022-10-17 | End: 2022-10-17

## 2022-10-17 RX ORDER — FENTANYL CITRATE 50 UG/ML
INJECTION, SOLUTION INTRAMUSCULAR; INTRAVENOUS
Status: COMPLETED | OUTPATIENT
Start: 2022-10-17 | End: 2022-10-17

## 2022-10-17 RX ORDER — LIDOCAINE HYDROCHLORIDE 10 MG/ML
INJECTION, SOLUTION EPIDURAL; INFILTRATION; INTRACAUDAL; PERINEURAL
Status: COMPLETED | OUTPATIENT
Start: 2022-10-17 | End: 2022-10-17

## 2022-10-17 RX ORDER — DEXAMETHASONE SODIUM PHOSPHATE 10 MG/ML
INJECTION, SOLUTION INTRAMUSCULAR; INTRAVENOUS
Status: COMPLETED | OUTPATIENT
Start: 2022-10-17 | End: 2022-10-17

## 2022-10-17 RX ADMIN — FENTANYL CITRATE 50 MCG: 50 INJECTION, SOLUTION INTRAMUSCULAR; INTRAVENOUS at 10:25

## 2022-10-17 RX ADMIN — DEXAMETHASONE SODIUM PHOSPHATE 10 MG: 10 INJECTION, SOLUTION INTRAMUSCULAR; INTRAVENOUS at 10:26

## 2022-10-17 RX ADMIN — LIDOCAINE HYDROCHLORIDE 5 ML: 10 INJECTION, SOLUTION EPIDURAL; INFILTRATION; INTRACAUDAL; PERINEURAL at 10:25

## 2022-10-17 RX ADMIN — SODIUM CHLORIDE 500 ML: 0.9 INJECTION, SOLUTION INTRAVENOUS at 10:25

## 2022-10-17 RX ADMIN — MIDAZOLAM 1 MG: 1 INJECTION INTRAMUSCULAR; INTRAVENOUS at 10:25

## 2022-10-17 RX ADMIN — IOPAMIDOL 3 ML: 408 INJECTION, SOLUTION INTRATHECAL at 10:26

## 2022-10-17 ASSESSMENT — PAIN - FUNCTIONAL ASSESSMENT
PAIN_FUNCTIONAL_ASSESSMENT: PREVENTS OR INTERFERES SOME ACTIVE ACTIVITIES AND ADLS
PAIN_FUNCTIONAL_ASSESSMENT: 0-10
PAIN_FUNCTIONAL_ASSESSMENT: 0-10

## 2022-10-17 ASSESSMENT — PAIN DESCRIPTION - DESCRIPTORS: DESCRIPTORS: STABBING;ACHING;SHOOTING

## 2022-10-17 NOTE — H&P
UPDATE:  Office visit pain clinic in [facility] with all required elements of H&P dated 09/22/2022  Patient seen preop, chart reviewed,   No changes in medical history and health assessment since last evaluation. PE:  AAO x 3, in NAD, VSS,. Resp: breathing on RA, no respiratory distress  Cardiac: rate normal    Risk / Benefits explained to patient, patient agree to proceed with plan.   ASA 3  MP 3

## 2022-10-17 NOTE — OP NOTE
Preoperative Diagnosis: Cervical disc herniation and radiculitis  Postoperative Diagnosis:  Cervical disc herniation and radiculitis    Procedure Performed:  Cervical epidural steroid injection under fluoroscopy guidance    BLOOD LOSS: NONE    Procedure: The Patient was seen in the preop area, chart was reviewed, informed consent was obtained. Patient was taken to procedure room and was placed in prone position. Vital signs were monitored through out the  Procedure. A time out was completed. The site was prepped and draped in sterile manner. The target point was marked at The interlaminar space at C7/T1 . Skin and deep tissues were anesthetized with 1 % lidocaine. A  19-gauge Tuohy epidural needlele was advanced  under fluoroscopy guidance in AP view. Epidural space was identified using INDIO technique. A 19 G catheter was threaded up in epidural space for 2 levels. Position was confirmed in Lateral view. Then after negative aspiration contrast dye was injected with live fluoroscopy in AP views that showed  spread of the contrast in the epidural space  and no vascular runoff or intrathecal spread. Finally 5 ml of treatment solution containing 4 ml of PF NS and 1 ml of dexamethasone 10 mg / ml was injected. The needle was removed and a Band-Aid was placed over the needle  insertion site. The patient's vital signs remained stable and the patient tolerated the procedure well. SEDATION NOTE:    ASA CLASSIFICATION  3  MP   CLASSIFICATION  3    Moderate intravenous conscious sedation was supervised by Dr. Rachid Mathias  The patient was independently monitored by a Registered Nurse assigned to the Procedure Room  Monitoring included automated blood pressure, continuous EKG, Capnography and continuous pulse oximetry. The detailed Conscious Record is permanently stored in the [place] [id number].      The following is the conscious sedation record;  Start Time:  1020  End times:  1032  Duration:  12 minutes  MEDS GIVEN 1 MG VERSED AND 50 MCG FENTANYL

## 2022-10-17 NOTE — DISCHARGE INSTRUCTIONS
Discharge Instructions following Sedation or Anesthesia:  You have  received  a sedative/anesthetic therefore, you should not consume any alcoholic beverages for minimum of 12 hours. Do not drive or operate machinery for 24 hours. Do not sign legal documents for 24 hours. Dizziness, drowsiness, and unsteadiness may occur. Rest when need to. Increase diet as tolerated. Keep up on fluids if diet allows. General Instructions:  Do not take a tub bath for 72 hours after procedure (this includes hot tubs and swimming pools). You may shower, but avoid hot water to injection site. Avoid strenuous activity TODAY especially if you experience dizziness. Remove band-aid the next day. Wash off any residual iodine   Do not use heat, heating pad, or any other heating device over the injection site for 3 days after the procedure. If you experience pain after your procedure, you may continue with your current pain medication as prescribed. (DO NOT INCREASE YOUR PAIN MEDICATION WITHOUT TALKING TO DOCTOR)  Soreness and pain at injection site is common, may use ice to reduce soreness. What To Expect After A Steroid Injection  You should start to feel the effects of the steroid injection in about 48-72 hours  You may experience facial flushing, night sweats and irritability. Other common steroid related side effects are increased appetite, mood elevation, insomnia and fluid retention. These effects usually subside in a few days. Steroids used in epidural injections may cause muscle spasms for a few days. If you are diabetic, your blood sugar may be elevated after your procedure due to the steroids. You will need to monitor your blood sugar more closely while going through a series of injections (Check blood sugar at meals and bedtime for 5 days). You may require adjustment in your diabetic medications, contact your PCP office to discuss.     Call Gene Noe at [phone] if you experience:   Fever, chills or temperature over 100    Vomiting, Headache, persistent stiff neck, nausea, blurred vision   Difficulty in urinating or unable to urinate with 8 hours   Increase in weakness, numbness or loss of function   Increased redness, swelling or drainage at the injection site

## 2022-10-31 ENCOUNTER — HOSPITAL ENCOUNTER (OUTPATIENT)
Dept: PAIN MANAGEMENT | Age: 82
Discharge: HOME | End: 2022-10-31
Payer: MEDICARE

## 2022-10-31 VITALS
RESPIRATION RATE: 18 BRPM | WEIGHT: 137 LBS | HEART RATE: 55 BPM | DIASTOLIC BLOOD PRESSURE: 48 MMHG | BODY MASS INDEX: 25.21 KG/M2 | SYSTOLIC BLOOD PRESSURE: 129 MMHG | OXYGEN SATURATION: 97 % | HEIGHT: 62 IN

## 2022-10-31 DIAGNOSIS — M47.812 CERVICAL SPONDYLOSIS WITHOUT MYELOPATHY: ICD-10-CM

## 2022-10-31 DIAGNOSIS — M48.062 SPINAL STENOSIS OF LUMBAR REGION WITH NEUROGENIC CLAUDICATION: Primary | ICD-10-CM

## 2022-10-31 PROCEDURE — 99213 OFFICE O/P EST LOW 20 MIN: CPT

## 2022-10-31 PROCEDURE — 99213 OFFICE O/P EST LOW 20 MIN: CPT | Performed by: NURSE PRACTITIONER

## 2022-10-31 ASSESSMENT — ENCOUNTER SYMPTOMS
BACK PAIN: 1
SHORTNESS OF BREATH: 0
CONSTIPATION: 0
COUGH: 0

## 2022-10-31 NOTE — PROGRESS NOTES
Monohyd Macro] Hives    Sulfa Antibiotics          Current Outpatient Medications:     gabapentin (NEURONTIN) 100 MG capsule, Take 100 mg by mouth 3 times daily. , Disp: , Rfl:     ferrous sulfate (IRON 325) 325 (65 Fe) MG tablet, Take 325 mg by mouth daily (with breakfast) (Patient not taking: No sig reported), Disp: , Rfl:     levothyroxine (SYNTHROID) 50 MCG tablet, Take 50 mcg by mouth Daily, Disp: , Rfl:     acetaminophen (TYLENOL) 500 MG tablet, Take 500 mg by mouth every 6 hours as needed for Pain, Disp: , Rfl:     tiZANidine (ZANAFLEX) 2 MG tablet, Take 1 tablet by mouth every 8 hours as needed (muscle spasms) (Patient not taking: No sig reported), Disp: 90 tablet, Rfl: 0    Multiple Vitamins-Minerals (ICAPS AREDS 2 PO), Take by mouth, Disp: , Rfl:     guaiFENesin (MUCINEX) 600 MG extended release tablet, Take 1,200 mg by mouth 2 times daily (Patient not taking: Reported on 10/31/2022), Disp: , Rfl:     carvedilol (COREG) 25 MG tablet, Take 25 mg by mouth 2 times daily (with meals), Disp: , Rfl:     benazepril (LOTENSIN) 10 MG tablet, Take 10 mg by mouth daily, Disp: , Rfl:     traMADol (ULTRAM) 50 MG tablet, Take 50 mg by mouth every 6 hours as needed for Pain., Disp: , Rfl:     simvastatin (ZOCOR) 40 MG tablet, Take 40 mg by mouth nightly, Disp: , Rfl:     celecoxib (CELEBREX) 200 MG capsule, Take 200 mg by mouth daily. , Disp: , Rfl:     metFORMIN (GLUCOPHAGE) 500 MG tablet, Take 500 mg by mouth daily (with breakfast). , Disp: , Rfl:     omeprazole (PRILOSEC) 20 MG capsule, Take 20 mg by mouth daily. , Disp: , Rfl:     Multiple Vitamins-Minerals (PX SENIOR VITAMIN PO), Take  by mouth., Disp: , Rfl:     Calcium Carbonate-Vitamin D 600-400 MG-UNIT CHEW, Take  by mouth 2 times daily. , Disp: , Rfl:     Family History   Problem Relation Age of Onset    Breast Cancer Paternal Cousin         >48 yo    Cancer Neg Hx     Colon Cancer Neg Hx     Diabetes Neg Hx     Eclampsia Neg Hx     Hypertension Neg Hx     Ovarian Cancer Neg Hx      Labor Neg Hx     Spont Abortions Neg Hx     Stroke Neg Hx        Social History     Socioeconomic History    Marital status:      Spouse name: Not on file    Number of children: Not on file    Years of education: Not on file    Highest education level: Not on file   Occupational History    Not on file   Tobacco Use    Smoking status: Never    Smokeless tobacco: Never   Vaping Use    Vaping Use: Never used   Substance and Sexual Activity    Alcohol use: No     Alcohol/week: 0.0 standard drinks    Drug use: No    Sexual activity: Yes     Partners: Male     Birth control/protection: Surgical, Post-menopausal     Comment: hysterectomy BSO   Other Topics Concern    Not on file   Social History Narrative    Not on file     Social Determinants of Health     Financial Resource Strain: Not on file   Food Insecurity: Not on file   Transportation Needs: Not on file   Physical Activity: Not on file   Stress: Not on file   Social Connections: Not on file   Intimate Partner Violence: Not on file   Housing Stability: Not on file       Review of Systems:  Review of Systems   Constitutional: Negative for chills and fever. Cardiovascular:  Negative for chest pain and palpitations. Respiratory:  Negative for cough and shortness of breath. Musculoskeletal:  Positive for back pain and neck pain. Gastrointestinal:  Negative for constipation. Neurological:  Positive for headaches. Negative for disturbances in coordination, loss of balance, numbness, tingling and weakness. Physical Exam:  BP (!) 129/48   Pulse 55   Resp 18   Ht 5' 2\" (1.575 m)   Wt 137 lb (62.1 kg)   LMP  (LMP Unknown)   SpO2 97%   BMI 25.06 kg/m²     Physical Exam  HENT:      Head: Normocephalic. Pulmonary:      Effort: Pulmonary effort is normal.   Musculoskeletal:         General: Normal range of motion. Cervical back: Normal range of motion. Tenderness present. Lumbar back: Tenderness present.    Skin: General: Skin is warm and dry. Neurological:      Mental Status: She is alert and oriented to person, place, and time. Record/Diagnostics Review:    FINDINGS:   BONES/ALIGNMENT: There is levocurvature of the lumbar spine. The vertebral   body heights are maintained. There is age-appropriate bone marrow signal.   There is multilevel degenerative disc disease with loss of disc signal.   There is disc space narrowing in the mid and lower lumbar spine. There is no   spondylolisthesis. SPINAL CORD: The conus is normal in caliber and signal and terminates at the   L1-2 level. The cauda equina is unremarkable. SOFT TISSUES: The posterior paraspinal soft tissues are unremarkable. The   visualized abdominal structures are unremarkable. L1-L2: There is a circumferential disc bulge with facet hypertrophy. There   is no canal stenosis or foraminal narrowing. L2-L3: There is a circumferential disc bulge with facet and ligamentous   hypertrophy. There is no canal stenosis or foraminal narrowing. L3-L4: There is a circumferential disc bulge with facet and ligamentous   hypertrophy. There is no canal stenosis or left foraminal narrowing. There   is severe right foraminal narrowing. L4-L5: There is a circumferential disc bulge with facet and ligamentous   hypertrophy. There is canal stenosis measuring 9 mm in AP dimension. There   is moderate right and severe left foraminal narrowing. L5-S1: There is a circumferential disc bulge with facet and ligamentous   hypertrophy. There is no canal stenosis. There is mild right and severe   left foraminal narrowing. Impression   Levocurvature with multilevel degenerative disc disease and multilevel   degenerative facet/ligamentous hypertrophy resulting in mild canal stenosis   at L4-5. Bilateral foraminal narrowing as described above.            Assessment:  Problem List Items Addressed This Visit       Spinal stenosis of lumbar region with neurogenic claudication - Primary (Chronic)    Cervical spondylosis without myelopathy (Chronic)          Treatment Plan:  No relief following CHAPIS  Pt is scheduled to see Dr. Carter Tanner next week  Consider lumbar injections if appropriate in the future  Follow up in 4 weeks    I have reviewed the chief complaint and history of present illness (including ROS and PFSH) and vital documentation by my staff and I agree with their documentation and have added where applicable.

## 2022-11-03 ENCOUNTER — OFFICE VISIT (OUTPATIENT)
Dept: NEUROSURGERY | Age: 82
End: 2022-11-03
Payer: MEDICARE

## 2022-11-03 VITALS
RESPIRATION RATE: 18 BRPM | SYSTOLIC BLOOD PRESSURE: 191 MMHG | OXYGEN SATURATION: 99 % | BODY MASS INDEX: 25.79 KG/M2 | TEMPERATURE: 98 F | HEART RATE: 59 BPM | WEIGHT: 141 LBS | DIASTOLIC BLOOD PRESSURE: 80 MMHG

## 2022-11-03 DIAGNOSIS — M54.2 CERVICALGIA: ICD-10-CM

## 2022-11-03 DIAGNOSIS — M47.812 CERVICAL SPONDYLOSIS: ICD-10-CM

## 2022-11-03 DIAGNOSIS — M54.81 OCCIPITAL NEURALGIA OF RIGHT SIDE: Primary | ICD-10-CM

## 2022-11-03 PROCEDURE — 99214 OFFICE O/P EST MOD 30 MIN: CPT | Performed by: NEUROLOGICAL SURGERY

## 2022-11-03 PROCEDURE — 1123F ACP DISCUSS/DSCN MKR DOCD: CPT | Performed by: NEUROLOGICAL SURGERY

## 2022-11-03 NOTE — PROGRESS NOTES
Department of Neurosurgery                                                      Follow up visit      History Obtained From:  patient    CHIEF COMPLAINT:         Chief Complaint   Patient presents with    Follow-up       HISTORY OF PRESENT ILLNESS:       The patient is a 78 y.o. female who presents for follow up for Cervical spondylosis without myelopathy. Patient reports about one year of constant neck pain that radiates up into the right side of the head and into her face. Patient characterizes pain as achy in the neck and burning in the head. Patient reports head pain is worse than the neck. Patient reports at first it felt like a stiff neck, but has worsened over time. Patient reports taking OTC tylenol that relieves some pain. Patient reports pain is worse as the day goes on, and is aggravated by driving. Patient reports hx of oblation and injections, which did not relieved pain.      PAST MEDICAL HISTORY :       Past Medical History:        Diagnosis Date    Arthritis     Diabetes mellitus ([address]     High cholesterol     Migraines     Vision abnormalities        Past Surgical History:        Procedure Laterality Date    APPENDECTOMY  1949    CHOLECYSTECTOMY  1971    COLONOSCOPY  10/12/07    HYSTERECTOMY (CERVIX STATUS UNKNOWN)  1983    Ovaries removed    ORIF HUMERUS DECOMPRESSION Left 2010    PAIN MANAGEMENT PROCEDURE      SALPINGO-OOPHORECTOMY  1983    TONSILLECTOMY AND ADENOIDECTOMY  1962       Social History:   Social History     Socioeconomic History    Marital status:      Spouse name: Not on file    Number of children: Not on file    Years of education: Not on file    Highest education level: Not on file   Occupational History    Not on file   Tobacco Use    Smoking status: Never    Smokeless tobacco: Never   Vaping Use    Vaping Use: Never used   Substance and Sexual Activity    Alcohol use: No     Alcohol/week: 0.0 standard drinks    Drug use: No    Sexual activity: Yes Partners: Male     Birth control/protection: Surgical, Post-menopausal     Comment: hysterectomy BSO   Other Topics Concern    Not on file   Social History Narrative    Not on file     Social Determinants of Health     Financial Resource Strain: Not on file   Food Insecurity: Not on file   Transportation Needs: Not on file   Physical Activity: Not on file   Stress: Not on file   Social Connections: Not on file   Intimate Partner Violence: Not on file   Housing Stability: Not on file       Family History:       Problem Relation Age of Onset    Breast Cancer Paternal Cousin         >46 yo    Cancer Neg Hx     Colon Cancer Neg Hx     Diabetes Neg Hx     Eclampsia Neg Hx     Hypertension Neg Hx     Ovarian Cancer Neg Hx      Labor Neg Hx     Spont Abortions Neg Hx     Stroke Neg Hx        Allergies:  Aspirin, Duricef [cefadroxil], Lipitor [atorvastatin calcium], Macrobid [nitrofurantoin monohyd macro], and Sulfa antibiotics    Home Medications:  Prior to Admission medications    Medication Sig Start Date End Date Taking? Authorizing Provider   gabapentin (NEURONTIN) 100 MG capsule Take 100 mg by mouth 3 times daily. Yes Historical Provider, MD   levothyroxine (SYNTHROID) 50 MCG tablet Take 50 mcg by mouth Daily   Yes Historical Provider, MD   acetaminophen (TYLENOL) 500 MG tablet Take 500 mg by mouth every 6 hours as needed for Pain   Yes Historical Provider, MD   Multiple Vitamins-Minerals (ICAPS AREDS 2 PO) Take by mouth   Yes Historical Provider, MD   carvedilol (COREG) 25 MG tablet Take 25 mg by mouth 2 times daily (with meals)   Yes Historical Provider, MD   benazepril (LOTENSIN) 10 MG tablet Take 10 mg by mouth daily   Yes Historical Provider, MD   traMADol (ULTRAM) 50 MG tablet Take 50 mg by mouth every 6 hours as needed for Pain.    Yes Historical Provider, MD   simvastatin (ZOCOR) 40 MG tablet Take 40 mg by mouth nightly   Yes Historical Provider, MD   celecoxib (CELEBREX) 200 MG capsule Take 200 mg by mouth daily. Yes Historical Provider, MD   metFORMIN (GLUCOPHAGE) 500 MG tablet Take 500 mg by mouth daily (with breakfast). Yes Historical Provider, MD   omeprazole (PRILOSEC) 20 MG capsule Take 20 mg by mouth daily. Yes Historical Provider, MD   Multiple Vitamins-Minerals (PX SENIOR VITAMIN PO) Take  by mouth. Yes Historical Provider, MD   Calcium Carbonate-Vitamin D 600-400 MG-UNIT CHEW Take  by mouth 2 times daily. Yes Historical Provider, MD   ferrous sulfate (IRON 325) 325 (65 Fe) MG tablet Take 325 mg by mouth daily (with breakfast)  Patient not taking: No sig reported    Historical Provider, MD   tiZANidine (ZANAFLEX) 2 MG tablet Take 1 tablet by mouth every 8 hours as needed (muscle spasms)  Patient not taking: No sig reported 12/8/21   BRAULIO Mcmahan - CNP   guaiFENesin (MUCINEX) 600 MG extended release tablet Take 1,200 mg by mouth 2 times daily  Patient not taking: No sig reported    Historical Provider, MD       Current Medications:   No current facility-administered medications for this visit. PHYSICAL EXAM:       BP (!) 191/80   Pulse 59   Temp 98 °F (36.7 °C)   Resp 18   Wt 141 lb (64 kg)   LMP  (LMP Unknown)   SpO2 99%   BMI 25.79 kg/m²   Physical Exam     Gen: NAD  HEENT: moist mucus membranes  Cardio: RRR  Pulm: chest rise symmetrically  GI: abd soft  Ext: no edema  Skin: warm    Neuro:    AOX3  CN 2-12 grossly intact  Speech articulate  Motor 5/5  No pronator drift  Sensation symmetrical           Radiology Review:    CT cervical spine wo contrast  9/21/2022  Official read:  No acute fracture or traumatic malalignment. Multilevel degenerative changes  as above with bilateral neural foraminal narrowing at multiple levels. MRI lumbar spine wo contrast  10/07/2022  Official read:  aKel Weston with multilevel degenerative disc disease and multilevel  degenerative facet/ligamentous hypertrophy resulting in mild canal stenosis  at L4-5.   Bilateral foraminal narrowing as described above. My read:  C1-2 facet spondylosis on the right and pannus    ASSESSMENT AND PLAN:       Patient Active Problem List   Diagnosis    Diabetes mellitus (HCC)    High cholesterol    Arthritis    Vision abnormalities    Migraines    S/P KAYLEE-BSO    Lumbar radiculopathy    Lumbosacral spondylosis without myelopathy    Spinal stenosis of lumbar region with neurogenic claudication    Cervical spondylosis    Cervicalgia    Occipital neuralgia of right side         A/P:  This is a 78 y.o. female with Occipital neuralgia of right side  -     Aguilar Urbano MD, Neurology, [facility]  Cervical spondylosis  Cervicalgia     I thoroughly discussed details of diagnosis, natural histories of disease, and treatment options. We discussed surgical and non-surgical options, namely injections, fusion surgery, or nerve block. I detailed the benefits, risks, recovery period, and alternatives of this surgery. I used the imaging to depict the surgery and diagnosis to the patient. I made a referral to Dr. Jose Howard for nerve block. Follow up in 3 months. Patient and/or family was counseled on the diagnosis and treatment plan    By signing my name below, I, Enrique Narvaez, attest that this documentation has been prepared under the direction and in the presence of Holly Christine DO. Electronically signed: Jimenez Corado, 11/3/22     This note was created using voice recognition software. There may be inaccuracies of transcription  that are inadvertently overlooked prior to the signature. There is any questions about the transcription please contact me.

## 2022-12-01 ENCOUNTER — HOSPITAL ENCOUNTER (OUTPATIENT)
Dept: PAIN MANAGEMENT | Age: 82
Discharge: HOME | End: 2022-12-01
Payer: MEDICARE

## 2022-12-01 VITALS
HEART RATE: 78 BPM | DIASTOLIC BLOOD PRESSURE: 47 MMHG | OXYGEN SATURATION: 98 % | BODY MASS INDEX: 25.95 KG/M2 | HEIGHT: 62 IN | WEIGHT: 141 LBS | SYSTOLIC BLOOD PRESSURE: 147 MMHG

## 2022-12-01 DIAGNOSIS — M48.062 SPINAL STENOSIS OF LUMBAR REGION WITH NEUROGENIC CLAUDICATION: Primary | ICD-10-CM

## 2022-12-01 DIAGNOSIS — M47.812 CERVICAL SPONDYLOSIS: ICD-10-CM

## 2022-12-01 PROCEDURE — 99214 OFFICE O/P EST MOD 30 MIN: CPT | Performed by: ANESTHESIOLOGY

## 2022-12-01 PROCEDURE — 99213 OFFICE O/P EST LOW 20 MIN: CPT

## 2022-12-01 ASSESSMENT — PAIN SCALES - GENERAL
PAINLEVEL_OUTOF10: 5
PAINLEVEL_OUTOF10: 5

## 2022-12-01 ASSESSMENT — ENCOUNTER SYMPTOMS
COUGH: 0
CONSTIPATION: 0
DIARRHEA: 0
NAUSEA: 0
CHEST TIGHTNESS: 0
VOMITING: 0
SHORTNESS OF BREATH: 0

## 2022-12-01 ASSESSMENT — PAIN DESCRIPTION - LOCATION
LOCATION: LEG;NECK
LOCATION: NECK;LEG

## 2022-12-01 ASSESSMENT — PAIN DESCRIPTION - FREQUENCY
FREQUENCY: CONTINUOUS
FREQUENCY: CONTINUOUS

## 2022-12-01 ASSESSMENT — PAIN DESCRIPTION - DESCRIPTORS
DESCRIPTORS: ACHING;BURNING;SHARP
DESCRIPTORS: ACHING;BURNING;SHARP

## 2022-12-01 ASSESSMENT — PAIN DESCRIPTION - PAIN TYPE: TYPE: CHRONIC PAIN

## 2022-12-01 NOTE — PROGRESS NOTES
The patient is a 78 y. o. Non- / non  female. No chief complaint on file. Patient is here today for: neck pain  Pain level: 5  Character: aching, stabbing, burning  Radiating: no  Weakness or numbness: no  Aggravating Factors: holding head up  Alleviating Factors: laying down  Constant or intermitting: constant  Bladder/bowel loss: no      Past Medical History:   Diagnosis Date    Arthritis     Diabetes mellitus ([address]     High cholesterol     Migraines     Vision abnormalities         Past Surgical History:   Procedure Laterality Date    APPENDECTOMY      CHOLECYSTECTOMY      COLONOSCOPY  10/12/07    HYSTERECTOMY (CERVIX STATUS UNKNOWN)      Ovaries removed    ORIF HUMERUS DECOMPRESSION Left 2010    PAIN MANAGEMENT PROCEDURE      SALPINGO-OOPHORECTOMY  1983    TONSILLECTOMY AND ADENOIDECTOMY         Social History     Socioeconomic History    Marital status:    Tobacco Use    Smoking status: Never    Smokeless tobacco: Never   Vaping Use    Vaping Use: Never used   Substance and Sexual Activity    Alcohol use: No     Alcohol/week: 0.0 standard drinks    Drug use: No    Sexual activity: Yes     Partners: Male     Birth control/protection: Surgical, Post-menopausal     Comment: hysterectomy BSO       Family History   Problem Relation Age of Onset    Breast Cancer Paternal Cousin         >46 yo    Cancer Neg Hx     Colon Cancer Neg Hx     Diabetes Neg Hx     Eclampsia Neg Hx     Hypertension Neg Hx     Ovarian Cancer Neg Hx      Labor Neg Hx     Spont Abortions Neg Hx     Stroke Neg Hx        Allergies   Allergen Reactions    Aspirin      Intolerance. Upsets stomach    Duricef [Cefadroxil] Hives    Lipitor [Atorvastatin Calcium]      Muscle pain    Macrobid [Nitrofurantoin Monohyd Macro] Hives    Sulfa Antibiotics        There were no vitals filed for this visit.     Current Outpatient Medications   Medication Sig Dispense Refill    gabapentin (NEURONTIN) 100 MG capsule Take 100 mg by mouth 3 times daily. ferrous sulfate (IRON 325) 325 (65 Fe) MG tablet Take 325 mg by mouth daily (with breakfast) (Patient not taking: No sig reported)      levothyroxine (SYNTHROID) 50 MCG tablet Take 50 mcg by mouth Daily      acetaminophen (TYLENOL) 500 MG tablet Take 500 mg by mouth every 6 hours as needed for Pain      tiZANidine (ZANAFLEX) 2 MG tablet Take 1 tablet by mouth every 8 hours as needed (muscle spasms) (Patient not taking: No sig reported) 90 tablet 0    Multiple Vitamins-Minerals (ICAPS AREDS 2 PO) Take by mouth      guaiFENesin (MUCINEX) 600 MG extended release tablet Take 1,200 mg by mouth 2 times daily (Patient not taking: No sig reported)      carvedilol (COREG) 25 MG tablet Take 25 mg by mouth 2 times daily (with meals)      benazepril (LOTENSIN) 10 MG tablet Take 10 mg by mouth daily      traMADol (ULTRAM) 50 MG tablet Take 50 mg by mouth every 6 hours as needed for Pain.      simvastatin (ZOCOR) 40 MG tablet Take 40 mg by mouth nightly      celecoxib (CELEBREX) 200 MG capsule Take 200 mg by mouth daily. metFORMIN (GLUCOPHAGE) 500 MG tablet Take 500 mg by mouth daily (with breakfast). omeprazole (PRILOSEC) 20 MG capsule Take 20 mg by mouth daily. Multiple Vitamins-Minerals (PX SENIOR VITAMIN PO) Take  by mouth. Calcium Carbonate-Vitamin D 600-400 MG-UNIT CHEW Take  by mouth 2 times daily. No current facility-administered medications for this encounter. Review of Systems   Constitutional:  Negative for chills, fatigue and fever. Respiratory:  Negative for cough, chest tightness and shortness of breath. Cardiovascular:  Negative for chest pain and leg swelling. Gastrointestinal:  Negative for constipation, diarrhea, nausea and vomiting. Endocrine: Negative for cold intolerance and heat intolerance. Musculoskeletal:  Positive for neck pain. Negative for neck stiffness. Neurological:  Positive for headaches.  Negative for dizziness, weakness, light-headedness and numbness. Objective:  Vital signs: (most recent): not currently breastfeeding. No fever. Assessment & Plan  No diagnosis found. No orders of the defined types were placed in this encounter. No orders of the defined types were placed in this encounter.            Electronically signed by Deborah Hinton on 12/1/2022 at 10:15 AM

## 2022-12-01 NOTE — PROGRESS NOTES
The patient is a 78 y. o. Non- / non  female. Chief Complaint   Patient presents with    Neck Pain     Patient seen today in clinic for follow up for lumbar radiculopathy and cervical radiculopathy. The lumbar radiculopathy persists and has been an issue for many years. She has previously had epidural injections for this with fair relief. She is currently taking gabapentin, which she reports helps with the neuropathy. She is on tramadol for her cervical radiculopathy. Saw neurosurgery, who referred her to neurology for an occipital nerve block, as she has failed multiple other modalities. She is still complaining of a right-sided neck pain with right occipital and temporal pains. Minimally relieved by tramadol.     Patient is here today for: neck pain  Pain level: 5  Character: aching, stabbing, burning  Radiating: no  Weakness or numbness: no  Aggravating Factors: holding head up  Alleviating Factors: laying down  Constant or intermitting: constant  Bladder/bowel loss: no      Past Medical History:   Diagnosis Date    Arthritis     Diabetes mellitus ([facility] [address]     High cholesterol     Migraines     Vision abnormalities         Past Surgical History:   Procedure Laterality Date    [facility]    COLONOSCOPY  10/12/07    HYSTERECTOMY (CERVIX STATUS UNKNOWN)  1983    Ovaries removed    ORIF HUMERUS DECOMPRESSION Left 2010    PAIN MANAGEMENT PROCEDURE      SALPINGO-OOPHORECTOMY  1983    TONSILLECTOMY AND ADENOIDECTOMY  1962       Social History     Socioeconomic History    Marital status:      Spouse name: None    Number of children: None    Years of education: None    Highest education level: None   Tobacco Use    Smoking status: Never    Smokeless tobacco: Never   Vaping Use    Vaping Use: Never used   Substance and Sexual Activity    Alcohol use: No     Alcohol/week: 0.0 standard drinks    Drug use: No    Sexual activity: Yes     Partners: Male     Birth control/protection: Surgical, Post-menopausal     Comment: hysterectomy BSO       Family History   Problem Relation Age of Onset    Breast Cancer Paternal Cousin         >48 yo    Cancer Neg Hx     Colon Cancer Neg Hx     Diabetes Neg Hx     Eclampsia Neg Hx     Hypertension Neg Hx     Ovarian Cancer Neg Hx      Labor Neg Hx     Spont Abortions Neg Hx     Stroke Neg Hx        Allergies   Allergen Reactions    Aspirin      Intolerance. Upsets stomach    Duricef [Cefadroxil] Hives    Lipitor [Atorvastatin Calcium]      Muscle pain    Macrobid [Nitrofurantoin Monohyd Macro] Hives    Sulfa Antibiotics        Vitals:    22 1025   BP: (!) 147/47   Pulse: 78   SpO2: 98%       Current Outpatient Medications   Medication Sig Dispense Refill    gabapentin (NEURONTIN) 100 MG capsule Take 100 mg by mouth 3 times daily. ferrous sulfate (IRON 325) 325 (65 Fe) MG tablet Take 325 mg by mouth daily (with breakfast) (Patient not taking: No sig reported)      levothyroxine (SYNTHROID) 50 MCG tablet Take 50 mcg by mouth Daily      acetaminophen (TYLENOL) 500 MG tablet Take 500 mg by mouth every 6 hours as needed for Pain      tiZANidine (ZANAFLEX) 2 MG tablet Take 1 tablet by mouth every 8 hours as needed (muscle spasms) (Patient not taking: No sig reported) 90 tablet 0    Multiple Vitamins-Minerals (ICAPS AREDS 2 PO) Take by mouth      guaiFENesin (MUCINEX) 600 MG extended release tablet Take 1,200 mg by mouth 2 times daily (Patient not taking: No sig reported)      carvedilol (COREG) 25 MG tablet Take 25 mg by mouth 2 times daily (with meals)      benazepril (LOTENSIN) 10 MG tablet Take 10 mg by mouth daily      traMADol (ULTRAM) 50 MG tablet Take 50 mg by mouth every 6 hours as needed for Pain.      simvastatin (ZOCOR) 40 MG tablet Take 40 mg by mouth nightly      celecoxib (CELEBREX) 200 MG capsule Take 200 mg by mouth daily. metFORMIN (GLUCOPHAGE) 500 MG tablet Take 500 mg by mouth daily (with breakfast). omeprazole (PRILOSEC) 20 MG capsule Take 20 mg by mouth daily. Multiple Vitamins-Minerals (PX SENIOR VITAMIN PO) Take  by mouth. Calcium Carbonate-Vitamin D 600-400 MG-UNIT CHEW Take  by mouth 2 times daily. No current facility-administered medications for this encounter. Review of Systems   Constitutional:  Negative for chills, fatigue and fever. Respiratory:  Negative for cough, chest tightness and shortness of breath. Cardiovascular:  Negative for chest pain and leg swelling. Gastrointestinal:  Negative for constipation, diarrhea, nausea and vomiting. Endocrine: Negative for cold intolerance and heat intolerance. Musculoskeletal:  Positive for neck pain. Negative for neck stiffness. Neurological:  Positive for headaches. Negative for dizziness, weakness, light-headedness and numbness. Objective:  General Appearance:  Comfortable, well-appearing, in no acute distress and not in pain. Vital signs: (most recent): Blood pressure (!) 147/47, pulse 78, height 5' 2\" (1.575 m), weight 141 lb (64 kg), SpO2 98 %, not currently breastfeeding. Vital signs are normal.  No fever. HEENT: Normal HEENT exam.    Lungs:  Normal effort. Heart: Normal rate. Abdomen: Abdomen is soft. Extremities: Normal range of motion. Assessment & Plan      1. Spinal stenosis of lumbar region with neurogenic claudication    2. Cervical spondylosis        Orders Placed This Encounter   Procedures    IN NJX DX/THER SBST INTRLMNR LMBR/SAC W/IMG GDN        No orders of the defined types were placed in this encounter. 1) We will have her continue to follow up with her scheduled appointment with neurology for an occipital nerve block. If this is successful, consideration will be given to a nerve stimulator. 2) We will schedule her for a corticosteroid injection of her L4-L5 radiculopathy.     Electronically signed by Nick Garrison DO on 12/1/2022 at 11:14 AM

## 2022-12-01 NOTE — PROGRESS NOTES
The patient is a 78 y. o. Non- / non  female.     Chief Complaint   Patient presents with    Neck Pain     Is an 38-year-old female with history of chronic neck and low back pain  Neck pain is predominantly on the right side associated with occipital headache  Imaging at shown multilevel cervical facet arthropathy and moderate spinal stenosis  Patient failed conservative measures  We did a cervical facet block and epidural injection with no significant improvement in symptoms  She was recently evaluated by neurosurgery and was not advised for any surgery  Now recommended for occipital nerve block which she is a scheduled with the neurology  Discussed with patient that if occipital nerve block provide her good short-term relief then we can consider for occipital nerve stimulation    Back pain  Chronic onset more than 1 year ago located on the lumbar area  Radiates down left leg to the foot  Associated left leg numbness  Aggravated with excessive activity and standing and walking  No associated changes in bladder or bowel control  No previous lumbar spine surgical history  Had recent MRI lumbar spine  In past had epidural injection with good outcome  Last intervention was several years ago    Patient is here today for: neck pain  Pain level: 5  Character: aching, stabbing, burning  Radiating: no  Weakness or numbness: no  Aggravating Factors: holding head up  Alleviating Factors: laying down  Constant or intermitting: constant  Bladder/bowel loss: no      Past Medical History:   Diagnosis Date    Arthritis     Diabetes mellitus ([address]     High cholesterol     Migraines     Vision abnormalities         Past Surgical History:   Procedure Laterality Date    [facility]    COLONOSCOPY  10/12/07    HYSTERECTOMY (CERVIX STATUS UNKNOWN)  1983    Ovaries removed    ORIF HUMERUS DECOMPRESSION Left 2010    PAIN MANAGEMENT PROCEDURE      SALPINGO-OOPHORECTOMY  1983    TONSILLECTOMY AND ADENOIDECTOMY         Social History     Socioeconomic History    Marital status:      Spouse name: None    Number of children: None    Years of education: None    Highest education level: None   Tobacco Use    Smoking status: Never    Smokeless tobacco: Never   Vaping Use    Vaping Use: Never used   Substance and Sexual Activity    Alcohol use: No     Alcohol/week: 0.0 standard drinks    Drug use: No    Sexual activity: Yes     Partners: Male     Birth control/protection: Surgical, Post-menopausal     Comment: hysterectomy BSO       Family History   Problem Relation Age of Onset    Breast Cancer Paternal Cousin         >48 yo    Cancer Neg Hx     Colon Cancer Neg Hx     Diabetes Neg Hx     Eclampsia Neg Hx     Hypertension Neg Hx     Ovarian Cancer Neg Hx      Labor Neg Hx     Spont Abortions Neg Hx     Stroke Neg Hx        Allergies   Allergen Reactions    Aspirin      Intolerance. Upsets stomach    Duricef [Cefadroxil] Hives    Lipitor [Atorvastatin Calcium]      Muscle pain    Macrobid [Nitrofurantoin Monohyd Macro] Hives    Sulfa Antibiotics        Vitals:    22 1025   BP: (!) 147/47   Pulse: 78   SpO2: 98%       Current Outpatient Medications   Medication Sig Dispense Refill    gabapentin (NEURONTIN) 100 MG capsule Take 100 mg by mouth 3 times daily.       ferrous sulfate (IRON 325) 325 (65 Fe) MG tablet Take 325 mg by mouth daily (with breakfast) (Patient not taking: No sig reported)      levothyroxine (SYNTHROID) 50 MCG tablet Take 50 mcg by mouth Daily      acetaminophen (TYLENOL) 500 MG tablet Take 500 mg by mouth every 6 hours as needed for Pain      tiZANidine (ZANAFLEX) 2 MG tablet Take 1 tablet by mouth every 8 hours as needed (muscle spasms) (Patient not taking: No sig reported) 90 tablet 0    Multiple Vitamins-Minerals (ICAPS AREDS 2 PO) Take by mouth      guaiFENesin (MUCINEX) 600 MG extended release tablet Take 1,200 mg by mouth 2 times daily (Patient not taking: No sig reported)      carvedilol (COREG) 25 MG tablet Take 25 mg by mouth 2 times daily (with meals)      benazepril (LOTENSIN) 10 MG tablet Take 10 mg by mouth daily      traMADol (ULTRAM) 50 MG tablet Take 50 mg by mouth every 6 hours as needed for Pain.      simvastatin (ZOCOR) 40 MG tablet Take 40 mg by mouth nightly      celecoxib (CELEBREX) 200 MG capsule Take 200 mg by mouth daily. metFORMIN (GLUCOPHAGE) 500 MG tablet Take 500 mg by mouth daily (with breakfast). omeprazole (PRILOSEC) 20 MG capsule Take 20 mg by mouth daily. Multiple Vitamins-Minerals (PX SENIOR VITAMIN PO) Take  by mouth. Calcium Carbonate-Vitamin D 600-400 MG-UNIT CHEW Take  by mouth 2 times daily. No current facility-administered medications for this encounter. Review of Systems   Constitutional:  Negative for chills, fatigue and fever. Respiratory:  Negative for cough, chest tightness and shortness of breath. Cardiovascular:  Negative for chest pain and leg swelling. Gastrointestinal:  Negative for constipation, diarrhea, nausea and vomiting. Endocrine: Negative for cold intolerance and heat intolerance. Musculoskeletal:  Positive for neck pain. Negative for neck stiffness. Neurological:  Positive for headaches. Negative for dizziness, weakness, light-headedness and numbness. Objective:  General Appearance:  Well-appearing, in no acute distress, uncomfortable and in pain. Vital signs: (most recent): Blood pressure (!) 147/47, pulse 78, height 5' 2\" (1.575 m), weight 141 lb (64 kg), SpO2 98 %, not currently breastfeeding. Vital signs are normal.  No fever. Output: Producing urine and producing stool. HEENT: Normal HEENT exam.    Lungs:  Normal effort and normal respiratory rate. She is not in respiratory distress. Heart: Normal rate. Extremities: Normal range of motion. There is no deformity. Neurological: Patient is alert and oriented to person, place and time.   Patient has normal coordination. Pupils:  Pupils are equal, round, and reactive to light. Pupils are equal.   Skin:  Warm and dry. No rash or cyanosis. Gait stable  Lumbar spine:  AROM limited and associated with pain  SLR equivocal    Assessment & Plan    Is an 25-year-old female with history of chronic neck and low back pain  Neck pain is predominantly on the right side associated with occipital headache  Imaging at shown multilevel cervical facet arthropathy and moderate spinal stenosis  Patient failed conservative measures  We did a cervical facet block and epidural injection with no significant improvement in symptoms  She was recently evaluated by neurosurgery and was not advised for any surgery  Now recommended for occipital nerve block which she is a scheduled with the neurology  Discussed with patient that if occipital nerve block provide her good short-term relief then we can consider for occipital nerve stimulation    Back pain  Chronic onset more than 1 year ago located on the lumbar area  Radiates down left leg to the foot  Associated left leg numbness  Aggravated with excessive activity and standing and walking  No associated changes in bladder or bowel control  No previous lumbar spine surgical history  Had recent MRI lumbar spine  In past had epidural injection with good outcome  Last intervention was several years ago    1. Spinal stenosis of lumbar region with neurogenic claudication    2. Cervical spondylosis      EXAMINATION: MRI OF THE LUMBAR SPINE WITHOUT CONTRAST, 10/7/2022 2:09 pm     L1-L2: There is a circumferential disc bulge with facet hypertrophy. There is no canal stenosis or foraminal narrowing. L2-L3: There is a circumferential disc bulge with facet and ligamentous hypertrophy. There is no canal stenosis or foraminal narrowing. L3-L4: There is a circumferential disc bulge with facet and ligamentous hypertrophy. There is no canal stenosis or left foraminal narrowing.   There is severe right foraminal narrowing. L4-L5: There is a circumferential disc bulge with facet and ligamentous hypertrophy. There is canal stenosis measuring 9 mm in AP dimension. There is moderate right and severe left foraminal narrowing. L5-S1: There is a circumferential disc bulge with facet and ligamentous hypertrophy. There is no canal stenosis. There is mild right and severe left foraminal narrowing. MRI lumbar spine  Report is reviewed  Images reviewed independently  Finding discussed with patient  Moderate spinal stenosis with severe left-sided foraminal stenosis at L4-L5    Will recommend for lumbar epidural injection  If this do not provide good relief consider for diagnostic lumbar MBB      Orders Placed This Encounter   Procedures    UT NJX DX/THER SBST INTRLMNR LMBR/SAC W/IMG GDN        No orders of the defined types were placed in this encounter.            Electronically signed by Daxa Paz MD on 12/1/2022 at 11:17 AM

## 2023-01-09 ENCOUNTER — HOSPITAL ENCOUNTER (OUTPATIENT)
Dept: PAIN MANAGEMENT | Age: 83
Discharge: HOME | End: 2023-01-09
Payer: MEDICARE

## 2023-01-09 ENCOUNTER — HOSPITAL ENCOUNTER (OUTPATIENT)
Dept: GENERAL RADIOLOGY | Age: 83
Discharge: HOME | End: 2023-01-11
Payer: MEDICARE

## 2023-01-09 VITALS
TEMPERATURE: 97.1 F | RESPIRATION RATE: 9 BRPM | DIASTOLIC BLOOD PRESSURE: 54 MMHG | BODY MASS INDEX: 24.93 KG/M2 | WEIGHT: 135.5 LBS | OXYGEN SATURATION: 97 % | SYSTOLIC BLOOD PRESSURE: 112 MMHG | HEIGHT: 62 IN | HEART RATE: 57 BPM

## 2023-01-09 DIAGNOSIS — R52 PAIN MANAGEMENT: ICD-10-CM

## 2023-01-09 DIAGNOSIS — M48.062 SPINAL STENOSIS OF LUMBAR REGION WITH NEUROGENIC CLAUDICATION: Primary | Chronic | ICD-10-CM

## 2023-01-09 PROCEDURE — 3209999900 FLUORO FOR SURGICAL PROCEDURES

## 2023-01-09 PROCEDURE — 2500000003 HC RX 250 WO HCPCS: Performed by: ANESTHESIOLOGY

## 2023-01-09 PROCEDURE — 62323 NJX INTERLAMINAR LMBR/SAC: CPT | Performed by: ANESTHESIOLOGY

## 2023-01-09 PROCEDURE — 6360000002 HC RX W HCPCS: Performed by: ANESTHESIOLOGY

## 2023-01-09 PROCEDURE — 62323 NJX INTERLAMINAR LMBR/SAC: CPT

## 2023-01-09 PROCEDURE — 6360000004 HC RX CONTRAST MEDICATION: Performed by: ANESTHESIOLOGY

## 2023-01-09 PROCEDURE — 99152 MOD SED SAME PHYS/QHP 5/>YRS: CPT | Performed by: ANESTHESIOLOGY

## 2023-01-09 RX ORDER — FENTANYL CITRATE 0.05 MG/ML
INJECTION, SOLUTION INTRAMUSCULAR; INTRAVENOUS
Status: COMPLETED | OUTPATIENT
Start: 2023-01-09 | End: 2023-01-09

## 2023-01-09 RX ORDER — DEXAMETHASONE SODIUM PHOSPHATE 10 MG/ML
INJECTION, SOLUTION INTRAMUSCULAR; INTRAVENOUS
Status: COMPLETED | OUTPATIENT
Start: 2023-01-09 | End: 2023-01-09

## 2023-01-09 RX ORDER — LIDOCAINE HYDROCHLORIDE 10 MG/ML
INJECTION, SOLUTION EPIDURAL; INFILTRATION; INTRACAUDAL; PERINEURAL
Status: COMPLETED | OUTPATIENT
Start: 2023-01-09 | End: 2023-01-09

## 2023-01-09 RX ORDER — MIDAZOLAM HYDROCHLORIDE 1 MG/ML
INJECTION INTRAMUSCULAR; INTRAVENOUS
Status: COMPLETED | OUTPATIENT
Start: 2023-01-09 | End: 2023-01-09

## 2023-01-09 RX ADMIN — DEXAMETHASONE SODIUM PHOSPHATE 10 MG: 10 INJECTION, SOLUTION INTRAMUSCULAR; INTRAVENOUS at 10:24

## 2023-01-09 RX ADMIN — FENTANYL CITRATE 50 MCG: 0.05 INJECTION, SOLUTION INTRAMUSCULAR; INTRAVENOUS at 10:24

## 2023-01-09 RX ADMIN — LIDOCAINE HYDROCHLORIDE 3 ML: 10 INJECTION, SOLUTION EPIDURAL; INFILTRATION; INTRACAUDAL; PERINEURAL at 10:24

## 2023-01-09 RX ADMIN — MIDAZOLAM 1 MG: 1 INJECTION INTRAMUSCULAR; INTRAVENOUS at 10:24

## 2023-01-09 RX ADMIN — IOPAMIDOL 3 ML: 408 INJECTION, SOLUTION INTRATHECAL at 10:25

## 2023-01-09 ASSESSMENT — PAIN DESCRIPTION - DESCRIPTORS: DESCRIPTORS: ACHING

## 2023-01-09 ASSESSMENT — PAIN - FUNCTIONAL ASSESSMENT
PAIN_FUNCTIONAL_ASSESSMENT: 0-10
PAIN_FUNCTIONAL_ASSESSMENT: ACTIVITIES ARE NOT PREVENTED
PAIN_FUNCTIONAL_ASSESSMENT: 0-10

## 2023-01-09 ASSESSMENT — PAIN SCALES - GENERAL: PAINLEVEL_OUTOF10: 0

## 2023-01-09 NOTE — DISCHARGE INSTRUCTIONS
Discharge Instructions following Sedation or Anesthesia:  You have  received  a sedative/anesthetic therefore, you should not consume any alcoholic beverages for minimum of 12 hours. Do not drive or operate machinery for 24 hours. Do not sign legal documents for 24 hours. Dizziness, drowsiness, and unsteadiness may occur. Rest when need to. Increase diet as tolerated. Keep up on fluids if diet allows. General Instructions:  Do not take a tub bath for 72 hours after procedure (this includes hot tubs and swimming pools). You may shower, but avoid hot water to injection site. Avoid strenuous activity TODAY especially if you experience dizziness. Remove band-aid the next day. Wash off any residual iodine   Do not use heat, heating pad, or any other heating device over the injection site for 3 days after the procedure. If you experience pain after your procedure, you may continue with your current pain medication as prescribed. (DO NOT INCREASE YOUR PAIN MEDICATION WITHOUT TALKING TO DOCTOR)  Soreness and pain at injection site is common, may use ice to reduce soreness. What To Expect After A Steroid Injection  You should start to feel the effects of the steroid injection in about 48-72 hours  You may experience facial flushing, night sweats and irritability. Other common steroid related side effects are increased appetite, mood elevation, insomnia and fluid retention. These effects usually subside in a few days. Steroids used in epidural injections may cause muscle spasms for a few days. If you are diabetic, your blood sugar may be elevated after your procedure due to the steroids. You will need to monitor your blood sugar more closely while going through a series of injections (Check blood sugar at meals and bedtime for 5 days). You may require adjustment in your diabetic medications, contact your PCP office to discuss.     Call Gene Noe at [phone] if you experience:   Fever, chills or temperature over 100    Vomiting, Headache, persistent stiff neck, nausea, blurred vision   Difficulty in urinating or unable to urinate with 8 hours   Increase in weakness, numbness or loss of function   Increased redness, swelling or drainage at the injection site

## 2023-01-09 NOTE — OP NOTE
Patient Name: Juan Francisco Rangel   YOB: 1940  Room/Bed: Room/bed info not found  Medical Record Number: [id number]  Date: 1/9/2023       Sedation/ Anesthesia Plan:   intravenous sedation   as needed. Medications Planned:   midazolam (Versed) / Fentanyl  Intravenously  as needed. Preoperative Diagnosis:    1. Spinal stenosis of lumbar region with neurogenic claudication        Postoperative Diagnosis:    1. Spinal stenosis of lumbar region with neurogenic claudication        Procedure Performed:  Lumbar epidural steroid injection under fluoroscopy guidance    Blood Loss: None    Procedure: The Patient was seen in the preop area, chart was reviewed, informed consent was obtained. Patient was taken to procedure room and was placed in prone position. Vital signs were monitored through out the  Procedure. A time out was completed. The skin over the back was prepped and draped in sterile manner. The target point was marked at The interlaminar space at L5/S1 . Skin and deep tissues were anesthetized with 1 % lidocaine. A 20-gauge Tuohy epidural needlele was advanced  under fluoroscopy guidance in AP view. Epidural space was identified using INDIO technique. Position ws confirmed in Lateral view. Then after negative aspiration contrast dye Omnipaque-180 was injected with live fluoroscopy in AP views that showed  spread of the contrast in the epidural space  and no vascular runoff or intrathecal spread. Finally 5 ml of treatment solution containing 4 ml of PF NS and 1 ml of DEXAMETHASONE 10 mg / ml was injected. The needle was removed and a Band-Aid was placed over the needle  insertion site. The patient's vital signs remained stable and the patient tolerated the procedure well.       Electronically signed by Aleks Bull MD on 1/9/2023 at 10:50 AM    SEDATION NOTE:    ASA CLASSIFICATION  3  MP   CLASSIFICATION  3    Moderate intravenous conscious sedation was supervised by Dr. Padma Herr  The patient was independently monitored by a Registered Nurse assigned to the Procedure Room  Monitoring included automated blood pressure, continuous EKG, Capnography and continuous pulse oximetry. The detailed Conscious Record is permanently stored in the [facility].      The following is the conscious sedation record;  Start Time:  1020  End times:  1030  Duration:  10 MINUTES  MEDS GIVEN 1 MG VERSED AND 50 MCG FENTANYL

## 2023-01-09 NOTE — H&P
Pain Pre-Op H&P Note    Erasmo Sullivan MD    HPI: Silvia Chaney  presents with   Back pain  Chronic onset more than 1 year ago located on the lumbar area  Radiates down left leg to the foot  Associated left leg numbness  Aggravated with excessive activity and standing and walking  No associated changes in bladder or bowel control  No previous lumbar spine surgical history  Had recent MRI lumbar spine  In past had epidural injection with good outcome  Last intervention was several years ago       Past Medical History:   Diagnosis Date    Arthritis     Diabetes mellitus ([address]     High cholesterol     Migraines     Vision abnormalities        Past Surgical History:   Procedure Laterality Date    APPENDECTOMY      CHOLECYSTECTOMY      COLONOSCOPY  10/12/07    HYSTERECTOMY (CERVIX STATUS UNKNOWN)      Ovaries removed    ORIF HUMERUS DECOMPRESSION Left 2010    PAIN MANAGEMENT PROCEDURE      SALPINGO-OOPHORECTOMY      TONSILLECTOMY AND ADENOIDECTOMY         Family History   Problem Relation Age of Onset    Breast Cancer Paternal Cousin         >46 yo    Cancer Neg Hx     Colon Cancer Neg Hx     Diabetes Neg Hx     Eclampsia Neg Hx     Hypertension Neg Hx     Ovarian Cancer Neg Hx      Labor Neg Hx     Spont Abortions Neg Hx     Stroke Neg Hx        Allergies   Allergen Reactions    Aspirin      Intolerance. Upsets stomach    Duricef [Cefadroxil] Hives    Lipitor [Atorvastatin Calcium]      Muscle pain    Macrobid [Nitrofurantoin Monohyd Macro] Hives    Sulfa Antibiotics          Current Outpatient Medications:     gabapentin (NEURONTIN) 100 MG capsule, Take 100 mg by mouth 3 times daily. , Disp: , Rfl:     ferrous sulfate (IRON 325) 325 (65 Fe) MG tablet, Take 325 mg by mouth daily (with breakfast) (Patient not taking: No sig reported), Disp: , Rfl:     levothyroxine (SYNTHROID) 50 MCG tablet, Take 50 mcg by mouth Daily, Disp: , Rfl:     acetaminophen (TYLENOL) 500 MG tablet, Take 500 mg by mouth every 6 hours as needed for Pain, Disp: , Rfl:     tiZANidine (ZANAFLEX) 2 MG tablet, Take 1 tablet by mouth every 8 hours as needed (muscle spasms) (Patient not taking: No sig reported), Disp: 90 tablet, Rfl: 0    Multiple Vitamins-Minerals (ICAPS AREDS 2 PO), Take by mouth, Disp: , Rfl:     guaiFENesin (MUCINEX) 600 MG extended release tablet, Take 1,200 mg by mouth 2 times daily (Patient not taking: No sig reported), Disp: , Rfl:     carvedilol (COREG) 25 MG tablet, Take 25 mg by mouth 2 times daily (with meals), Disp: , Rfl:     benazepril (LOTENSIN) 10 MG tablet, Take 10 mg by mouth daily, Disp: , Rfl:     traMADol (ULTRAM) 50 MG tablet, Take 50 mg by mouth every 6 hours as needed for Pain., Disp: , Rfl:     simvastatin (ZOCOR) 40 MG tablet, Take 40 mg by mouth nightly, Disp: , Rfl:     celecoxib (CELEBREX) 200 MG capsule, Take 200 mg by mouth daily. , Disp: , Rfl:     metFORMIN (GLUCOPHAGE) 500 MG tablet, Take 500 mg by mouth daily (with breakfast). , Disp: , Rfl:     omeprazole (PRILOSEC) 20 MG capsule, Take 20 mg by mouth daily. , Disp: , Rfl:     Multiple Vitamins-Minerals (PX SENIOR VITAMIN PO), Take  by mouth., Disp: , Rfl:     Calcium Carbonate-Vitamin D 600-400 MG-UNIT CHEW, Take  by mouth 2 times daily. , Disp: , Rfl:     Social History     Tobacco Use    Smoking status: Never    Smokeless tobacco: Never   Substance Use Topics    Alcohol use: No     Alcohol/week: 0.0 standard drinks       Review of Systems:   Focused review of systems was performed, and negative as pertinent to diagnosis, except as stated in HPI. Physical Exam  Constitutional:       Appearance: Normal appearance. Pulmonary:      Effort: Pulmonary effort is normal.   Neurological:      Mental Status: alert. Psychiatric:         Attention and Perception: Attention and perception normal.         Mood and Affect: Mood and affect normal.   Cardiovascular:      Rate: Normal rate.          ASA: 3          Mallampati: 3       Patient's current physical status, medications, medical history, and HPI have been reviewed and updated as appropriate on this date: 01/09/23    Risk/Benefit(s): The risks, benefits, alternatives, and potential complications have been discussed with the patient/family and informed consent has been obtained for the procedure/sedation. Diagnosis:   1.  Spinal stenosis of lumbar region with neurogenic claudication            Plan:   Lumbar aron        Michael Harry MD

## 2023-01-23 ENCOUNTER — HOSPITAL ENCOUNTER (OUTPATIENT)
Dept: PAIN MANAGEMENT | Age: 83
Discharge: HOME | End: 2023-01-23
Payer: MEDICARE

## 2023-01-23 VITALS
DIASTOLIC BLOOD PRESSURE: 53 MMHG | BODY MASS INDEX: 24.84 KG/M2 | HEART RATE: 60 BPM | OXYGEN SATURATION: 98 % | SYSTOLIC BLOOD PRESSURE: 132 MMHG | HEIGHT: 62 IN | WEIGHT: 135 LBS

## 2023-01-23 DIAGNOSIS — M54.81 OCCIPITAL NEURALGIA OF RIGHT SIDE: ICD-10-CM

## 2023-01-23 DIAGNOSIS — G89.29 CHRONIC LOW BACK PAIN, UNSPECIFIED BACK PAIN LATERALITY, UNSPECIFIED WHETHER SCIATICA PRESENT: ICD-10-CM

## 2023-01-23 DIAGNOSIS — M47.812 CERVICAL SPONDYLOSIS: ICD-10-CM

## 2023-01-23 DIAGNOSIS — M54.16 LUMBAR RADICULOPATHY: ICD-10-CM

## 2023-01-23 DIAGNOSIS — M54.2 CERVICALGIA: ICD-10-CM

## 2023-01-23 DIAGNOSIS — M48.062 SPINAL STENOSIS OF LUMBAR REGION WITH NEUROGENIC CLAUDICATION: Primary | ICD-10-CM

## 2023-01-23 DIAGNOSIS — M47.817 LUMBOSACRAL SPONDYLOSIS WITHOUT MYELOPATHY: ICD-10-CM

## 2023-01-23 DIAGNOSIS — M54.50 CHRONIC LOW BACK PAIN, UNSPECIFIED BACK PAIN LATERALITY, UNSPECIFIED WHETHER SCIATICA PRESENT: ICD-10-CM

## 2023-01-23 PROCEDURE — 99213 OFFICE O/P EST LOW 20 MIN: CPT | Performed by: NURSE PRACTITIONER

## 2023-01-23 PROCEDURE — 99213 OFFICE O/P EST LOW 20 MIN: CPT

## 2023-01-23 ASSESSMENT — ENCOUNTER SYMPTOMS
SHORTNESS OF BREATH: 0
CONSTIPATION: 0
COUGH: 0
BACK PAIN: 1

## 2023-01-23 NOTE — PROGRESS NOTES
Chief Complaint   Patient presents with    Back Pain    Follow Up After Procedure     Lumbar epidural steroid injection          [facility] Pt is here for follow up after lumbar epidural steroid injection 1/9/23. She reports no relief following the procedure. Discussed trying lumbar MBB/RFA per POC and she would like to schedule this. Back Pain  This is a chronic problem. The current episode started more than 1 year ago. The problem occurs constantly. The problem is unchanged. The pain is present in the lumbar spine. The quality of the pain is described as stabbing. The pain radiates to the left thigh, left knee, right knee and right thigh. The pain is at a severity of 6/10. The pain is moderate. The pain is Worse during the night. The symptoms are aggravated by standing. Stiffness is present: na. Pertinent negatives include no chest pain or fever. (na) Risk factors include lack of exercise. She has tried heat, ice, home exercises, muscle relaxant and NSAIDs for the symptoms. The treatment provided mild relief. Dx:  S/P:Lumbar epidural steroid injection       Outcome   Any improvement of activity? No   Any side effects (appetite,leg cramping,facial fleshing):no   Increase of pain:  no  Pain score Today:  6  % of pain relief: 10%  Pain diary (medial branch block): No    No results found for: LABA1C      Patient denies any new neurological symptoms. No bowel or bladder incontinence, no weakness, and no falling.       Past Medical History:   Diagnosis Date    Arthritis     Diabetes mellitus ([address]     High cholesterol     Migraines     Vision abnormalities        Past Surgical History:   Procedure Laterality Date    [facility]    COLONOSCOPY  10/12/07    HYSTERECTOMY (CERVIX STATUS UNKNOWN)  1983    Ovaries removed    ORIF HUMERUS DECOMPRESSION Left 2010    PAIN MANAGEMENT PROCEDURE      SALPINGO-OOPHORECTOMY  1983    TONSILLECTOMY AND ADENOIDECTOMY  1962       Allergies Allergen Reactions    Aspirin      Intolerance. Upsets stomach    Duricef [Cefadroxil] Hives    Lipitor [Atorvastatin Calcium]      Muscle pain    Macrobid [Nitrofurantoin Monohyd Macro] Hives    Sulfa Antibiotics          Current Outpatient Medications:     gabapentin (NEURONTIN) 100 MG capsule, Take 100 mg by mouth 3 times daily. , Disp: , Rfl:     ferrous sulfate (IRON 325) 325 (65 Fe) MG tablet, Take 325 mg by mouth daily (with breakfast) (Patient not taking: No sig reported), Disp: , Rfl:     levothyroxine (SYNTHROID) 50 MCG tablet, Take 50 mcg by mouth Daily, Disp: , Rfl:     acetaminophen (TYLENOL) 500 MG tablet, Take 500 mg by mouth every 6 hours as needed for Pain, Disp: , Rfl:     tiZANidine (ZANAFLEX) 2 MG tablet, Take 1 tablet by mouth every 8 hours as needed (muscle spasms) (Patient not taking: No sig reported), Disp: 90 tablet, Rfl: 0    Multiple Vitamins-Minerals (ICAPS AREDS 2 PO), Take by mouth, Disp: , Rfl:     guaiFENesin (MUCINEX) 600 MG extended release tablet, Take 1,200 mg by mouth 2 times daily (Patient not taking: No sig reported), Disp: , Rfl:     carvedilol (COREG) 25 MG tablet, Take 25 mg by mouth 2 times daily (with meals), Disp: , Rfl:     benazepril (LOTENSIN) 10 MG tablet, Take 10 mg by mouth daily, Disp: , Rfl:     traMADol (ULTRAM) 50 MG tablet, Take 50 mg by mouth every 6 hours as needed for Pain., Disp: , Rfl:     simvastatin (ZOCOR) 40 MG tablet, Take 40 mg by mouth nightly, Disp: , Rfl:     celecoxib (CELEBREX) 200 MG capsule, Take 200 mg by mouth daily. , Disp: , Rfl:     metFORMIN (GLUCOPHAGE) 500 MG tablet, Take 500 mg by mouth daily (with breakfast). , Disp: , Rfl:     omeprazole (PRILOSEC) 20 MG capsule, Take 20 mg by mouth daily. , Disp: , Rfl:     Multiple Vitamins-Minerals (PX SENIOR VITAMIN PO), Take  by mouth., Disp: , Rfl:     Calcium Carbonate-Vitamin D 600-400 MG-UNIT CHEW, Take  by mouth 2 times daily. , Disp: , Rfl:     Family History   Problem Relation Age of Onset Breast Cancer Paternal Cousin         >48 yo    Cancer Neg Hx     Colon Cancer Neg Hx     Diabetes Neg Hx     Eclampsia Neg Hx     Hypertension Neg Hx     Ovarian Cancer Neg Hx      Labor Neg Hx     Spont Abortions Neg Hx     Stroke Neg Hx        Social History     Socioeconomic History    Marital status:      Spouse name: Not on file    Number of children: Not on file    Years of education: Not on file    Highest education level: Not on file   Occupational History    Not on file   Tobacco Use    Smoking status: Never    Smokeless tobacco: Never   Vaping Use    Vaping Use: Never used   Substance and Sexual Activity    Alcohol use: No     Alcohol/week: 0.0 standard drinks    Drug use: No    Sexual activity: Yes     Partners: Male     Birth control/protection: Surgical, Post-menopausal     Comment: hysterectomy BSO   Other Topics Concern    Not on file   Social History Narrative    Not on file     Social Determinants of Health     Financial Resource Strain: Not on file   Food Insecurity: Not on file   Transportation Needs: Not on file   Physical Activity: Not on file   Stress: Not on file   Social Connections: Not on file   Intimate Partner Violence: Not on file   Housing Stability: Not on file       Review of Systems:  Review of Systems   Constitutional: Negative for chills and fever. Cardiovascular:  Negative for chest pain and palpitations. Respiratory:  Negative for cough and shortness of breath. Musculoskeletal:  Positive for back pain and neck pain. Gastrointestinal:  Negative for constipation. Neurological:  Negative for disturbances in coordination and loss of balance. Physical Exam:  BP (!) 132/53   Pulse 60   Ht 5' 2\" (1.575 m)   Wt 135 lb (61.2 kg)   LMP  (LMP Unknown)   SpO2 98%   BMI 24.69 kg/m²     Physical Exam  HENT:      Head: Normocephalic. Pulmonary:      Effort: Pulmonary effort is normal.   Musculoskeletal:         General: Normal range of motion. Cervical back: Normal range of motion. Lumbar back: Tenderness present. Skin:     General: Skin is warm and dry. Neurological:      Mental Status: She is alert and oriented to person, place, and time. Record/Diagnostics Review:    FINDINGS:   BONES/ALIGNMENT: There is levocurvature of the lumbar spine. The vertebral   body heights are maintained. There is age-appropriate bone marrow signal.   There is multilevel degenerative disc disease with loss of disc signal.   There is disc space narrowing in the mid and lower lumbar spine. There is no   spondylolisthesis. SPINAL CORD: The conus is normal in caliber and signal and terminates at the   L1-2 level. The cauda equina is unremarkable. SOFT TISSUES: The posterior paraspinal soft tissues are unremarkable. The   visualized abdominal structures are unremarkable. L1-L2: There is a circumferential disc bulge with facet hypertrophy. There   is no canal stenosis or foraminal narrowing. L2-L3: There is a circumferential disc bulge with facet and ligamentous   hypertrophy. There is no canal stenosis or foraminal narrowing. L3-L4: There is a circumferential disc bulge with facet and ligamentous   hypertrophy. There is no canal stenosis or left foraminal narrowing. There   is severe right foraminal narrowing. L4-L5: There is a circumferential disc bulge with facet and ligamentous   hypertrophy. There is canal stenosis measuring 9 mm in AP dimension. There   is moderate right and severe left foraminal narrowing. L5-S1: There is a circumferential disc bulge with facet and ligamentous   hypertrophy. There is no canal stenosis. There is mild right and severe   left foraminal narrowing. Impression   Levocurvature with multilevel degenerative disc disease and multilevel   degenerative facet/ligamentous hypertrophy resulting in mild canal stenosis   at L4-5.        Bilateral foraminal narrowing as described above.       Assessment:  Problem List Items Addressed This Visit       Cervicalgia    Occipital neuralgia of right side    Spinal stenosis of lumbar region with neurogenic claudication - Primary (Chronic)    Lumbar radiculopathy    Lumbosacral spondylosis without myelopathy    Relevant Orders    FACET JOINT L/S    Cervical spondylosis     Other Visit Diagnoses       Chronic low back pain, unspecified back pain laterality, unspecified whether sciatica present        Relevant Orders    FACET JOINT L/S               Treatment Plan:  No relief following LESI    Has failed conservative options, significant axial low back pain with degenerative facet changes on MRI, good candidate for diagnostic bilateral lumbar facets to see if pain is facet mediated, if this is beneficial would be a candidate for radiofrequency ablation. Per Dr. Marlyn Junior, consider cervical MBB/RFA before occipital blocks          I have reviewed the chief complaint and history of present illness (including ROS and [address]) and vital documentation by my staff and I agree with their documentation and have added where applicable.

## 2023-02-08 ENCOUNTER — HOSPITAL ENCOUNTER (OUTPATIENT)
Dept: PAIN MANAGEMENT | Facility: CLINIC | Age: 83
Discharge: HOME | End: 2023-02-08
Payer: MEDICARE

## 2023-02-08 VITALS
SYSTOLIC BLOOD PRESSURE: 168 MMHG | HEART RATE: 60 BPM | RESPIRATION RATE: 10 BRPM | OXYGEN SATURATION: 100 % | TEMPERATURE: 97.7 F | DIASTOLIC BLOOD PRESSURE: 72 MMHG

## 2023-02-08 DIAGNOSIS — M47.817 LUMBOSACRAL SPONDYLOSIS WITHOUT MYELOPATHY: Primary | ICD-10-CM

## 2023-02-08 DIAGNOSIS — R52 PAIN MANAGEMENT: ICD-10-CM

## 2023-02-08 LAB — GLUCOSE BLD-MCNC: 68 MG/DL (ref 65–105)

## 2023-02-08 PROCEDURE — 64494 INJ PARAVERT F JNT L/S 2 LEV: CPT

## 2023-02-08 PROCEDURE — 6360000004 HC RX CONTRAST MEDICATION: Performed by: ANESTHESIOLOGY

## 2023-02-08 PROCEDURE — 64495 INJ PARAVERT F JNT L/S 3 LEV: CPT

## 2023-02-08 PROCEDURE — 82947 ASSAY GLUCOSE BLOOD QUANT: CPT

## 2023-02-08 PROCEDURE — 6360000002 HC RX W HCPCS: Performed by: ANESTHESIOLOGY

## 2023-02-08 PROCEDURE — 2500000003 HC RX 250 WO HCPCS: Performed by: ANESTHESIOLOGY

## 2023-02-08 PROCEDURE — 64493 INJ PARAVERT F JNT L/S 1 LEV: CPT

## 2023-02-08 RX ORDER — MIDAZOLAM HYDROCHLORIDE 2 MG/2ML
INJECTION, SOLUTION INTRAMUSCULAR; INTRAVENOUS
Status: COMPLETED | OUTPATIENT
Start: 2023-02-08 | End: 2023-02-08

## 2023-02-08 RX ORDER — LIDOCAINE HYDROCHLORIDE 10 MG/ML
INJECTION, SOLUTION EPIDURAL; INFILTRATION; INTRACAUDAL; PERINEURAL
Status: COMPLETED | OUTPATIENT
Start: 2023-02-08 | End: 2023-02-08

## 2023-02-08 RX ORDER — BUPIVACAINE HYDROCHLORIDE 5 MG/ML
INJECTION, SOLUTION EPIDURAL; INTRACAUDAL
Status: COMPLETED | OUTPATIENT
Start: 2023-02-08 | End: 2023-02-08

## 2023-02-08 RX ORDER — FENTANYL CITRATE 50 UG/ML
INJECTION, SOLUTION INTRAMUSCULAR; INTRAVENOUS
Status: COMPLETED | OUTPATIENT
Start: 2023-02-08 | End: 2023-02-08

## 2023-02-08 RX ADMIN — LIDOCAINE HYDROCHLORIDE 5 ML: 10 INJECTION, SOLUTION EPIDURAL; INFILTRATION; INTRACAUDAL at 16:23

## 2023-02-08 RX ADMIN — MIDAZOLAM HYDROCHLORIDE 1 MG: 1 INJECTION, SOLUTION INTRAMUSCULAR; INTRAVENOUS at 16:23

## 2023-02-08 RX ADMIN — BUPIVACAINE HYDROCHLORIDE 5 ML: 5 INJECTION, SOLUTION EPIDURAL; INTRACAUDAL; PERINEURAL at 16:23

## 2023-02-08 RX ADMIN — FENTANYL CITRATE 50 MCG: 50 INJECTION, SOLUTION INTRAMUSCULAR; INTRAVENOUS at 16:23

## 2023-02-08 RX ADMIN — IOHEXOL 3 ML: 180 INJECTION INTRAVENOUS at 16:23

## 2023-02-08 ASSESSMENT — PAIN - FUNCTIONAL ASSESSMENT
PAIN_FUNCTIONAL_ASSESSMENT: 0-10
PAIN_FUNCTIONAL_ASSESSMENT: NONE - DENIES PAIN
PAIN_FUNCTIONAL_ASSESSMENT: PREVENTS OR INTERFERES SOME ACTIVE ACTIVITIES AND ADLS

## 2023-02-08 ASSESSMENT — PAIN DESCRIPTION - DESCRIPTORS: DESCRIPTORS: ACHING;DISCOMFORT

## 2023-02-08 NOTE — H&P
UPDATE:  Office visit pain clinic in [facility] with all required elements of H&P dated 01/23/23  Patient seen preop, chart reviewed,   No changes in medical history and health assessment since last evaluation. PE:  AAO x 3, in NAD, VSS,. Resp: breathing on RA, no respiratory distress  Cardiac: rate normal    Risk / Benefits explained to patient, patient agree to proceed with plan.   ASA 3  MP 3

## 2023-02-08 NOTE — OP NOTE
Patient Name: Germania Mcdonald   YOB: 1940  Room/Bed: Room/bed info not found  Medical Record Number: [id number]  Date: 2/8/2023       Sedation/ Anesthesia Plan:   intravenous sedation   as needed. Medications Planned:   midazolam (Versed) / Fentanyl  Intravenously  as needed. Preoperative Diagnosis: Lumbar spondylosis w/o myelopathy or radiculopathy  Postoperative Diagnosis: Lumbar spondylosis w/o myelopathy or radiculopathy  Blood Loss: none    Procedure Performed:  Bilateral Lumbar Medial Branch nerve Blocks at the transverse processes of L3, L4, L5 under fluoroscopy guidance    Procedure: The Patient was seen in the preop area, chart was reviewed, informed consent was obtained. Patient was taken to procedure room and was placed in prone position. Vital signs were monitored through out the  Procedure. A time out was completed. The skin over the back was prepped and draped in sterile manner. The target point was marked at the junction of Transverse process and superior articular process at the target levels. Skin and deep tissues were anesthetized with 1 % lidocaine. A 25-gauge needlele was advanced to the target spots under fluoroscopy guidance in AP / Lateral and Oblique views. Then after negative aspiration contrast dye was injected with live fluoroscopy in AP views that showed  spread of the contrast with no epidural space and no vascular runoff or intrathecal spread. Finally 0.5 ml of treatment solution 0.5% bupivacaine  was injected at each level. The needle was removed and a Band-Aid was placed over the needle  insertion site. The patient's vital signs remained stable and the patient tolerated the procedure well.         Electronically signed by Shashi Neri MD on 2/8/2023 at 4:29 PM    SEDATION NOTE:    ASA CLASSIFICATION  3  MP   CLASSIFICATION  3    Moderate intravenous conscious sedation was supervised by Dr. Tiffani Bhatti  The patient was independently monitored by a Registered Nurse assigned to the Procedure Room  Monitoring included automated blood pressure, continuous EKG, Capnography and continuous pulse oximetry. The detailed Conscious Record is permanently stored in the [facility].      The following is the conscious sedation record;  Start Time:  1618  End times:  1630  Duration:  12 minutes  MEDS GIVEN 1 MG VERSED AND 50 MCG FENTANYL

## 2023-02-08 NOTE — DISCHARGE INSTRUCTIONS
Discharge Instructions following Sedation or Anesthesia:  You have  received  a sedative/anesthetic therefore, you should not consume any alcoholic beverages for minimum of 12 hours. Do not drive or operate machinery for 24 hours. Do not sign legal documents for 24 hours. Dizziness, drowsiness, and unsteadiness may occur. Rest when need to. Increase diet as tolerated. Keep up on fluids if diet allows. General Instructions:  Do not take a tub bath for 72 hours after procedure (this includes hot tubs and swimming pools). You may shower, but avoid hot water to injection site. Avoid strenuous activity TODAY especially if you experience dizziness. Remove band-aid the next day. Wash off any residual iodine   Do not use heat, heating pad, or any other heating device over the injection site for 3 days after the procedure. If you experience pain after your procedure, you may continue with your current pain medication as prescribed. (DO NOT INCREASE YOUR PAIN MEDICATION WITHOUT TALKING TO DOCTOR)  Soreness and pain at injection site is common, may use ice to reduce soreness. Please complete pain diary as instructed.      Call Gene Noe at [phone] if you experience:   Fever, chills or temperature over 100    Vomiting, Headache, persistent stiff neck, nausea, blurred vision   Difficulty in urinating or unable to urinate with 8 hours   Increase in weakness, numbness or loss of function   Increased redness, swelling or drainage at the injection site

## 2023-02-17 ENCOUNTER — TELEPHONE (OUTPATIENT)
Dept: PAIN MANAGEMENT | Age: 83
End: 2023-02-17

## 2023-02-20 NOTE — TELEPHONE ENCOUNTER
Called pt to go over MBB pain diary questions   Pain before MBB - 10  Pain after MBB - 2  Patient states that he did some housework  Patient reports 80% pain relief    Please place order for RFA  Patient is already approved and on for 02/27/2023

## 2023-02-27 ENCOUNTER — HOSPITAL ENCOUNTER (OUTPATIENT)
Dept: PAIN MANAGEMENT | Facility: CLINIC | Age: 83
Discharge: HOME | End: 2023-02-27
Payer: MEDICARE

## 2023-02-27 VITALS
DIASTOLIC BLOOD PRESSURE: 70 MMHG | HEART RATE: 52 BPM | OXYGEN SATURATION: 100 % | SYSTOLIC BLOOD PRESSURE: 159 MMHG | WEIGHT: 134 LBS | RESPIRATION RATE: 15 BRPM | HEIGHT: 62 IN | BODY MASS INDEX: 24.66 KG/M2 | TEMPERATURE: 97.8 F

## 2023-02-27 DIAGNOSIS — R52 PAIN: ICD-10-CM

## 2023-02-27 DIAGNOSIS — M47.817 LUMBOSACRAL SPONDYLOSIS WITHOUT MYELOPATHY: Primary | ICD-10-CM

## 2023-02-27 LAB — GLUCOSE BLD-MCNC: 88 MG/DL (ref 65–105)

## 2023-02-27 PROCEDURE — 82947 ASSAY GLUCOSE BLOOD QUANT: CPT

## 2023-02-27 PROCEDURE — 64635 DESTROY LUMB/SAC FACET JNT: CPT | Performed by: ANESTHESIOLOGY

## 2023-02-27 PROCEDURE — 64635 DESTROY LUMB/SAC FACET JNT: CPT

## 2023-02-27 PROCEDURE — 64636 DESTROY L/S FACET JNT ADDL: CPT | Performed by: ANESTHESIOLOGY

## 2023-02-27 PROCEDURE — 99152 MOD SED SAME PHYS/QHP 5/>YRS: CPT | Performed by: ANESTHESIOLOGY

## 2023-02-27 PROCEDURE — 2500000003 HC RX 250 WO HCPCS: Performed by: ANESTHESIOLOGY

## 2023-02-27 PROCEDURE — 6360000002 HC RX W HCPCS: Performed by: ANESTHESIOLOGY

## 2023-02-27 PROCEDURE — 64636 DESTROY L/S FACET JNT ADDL: CPT

## 2023-02-27 RX ORDER — LIDOCAINE HYDROCHLORIDE 40 MG/ML
INJECTION, SOLUTION RETROBULBAR; TOPICAL
Status: COMPLETED | OUTPATIENT
Start: 2023-02-27 | End: 2023-02-27

## 2023-02-27 RX ORDER — LIDOCAINE HYDROCHLORIDE 10 MG/ML
INJECTION, SOLUTION EPIDURAL; INFILTRATION; INTRACAUDAL; PERINEURAL
Status: COMPLETED | OUTPATIENT
Start: 2023-02-27 | End: 2023-02-27

## 2023-02-27 RX ORDER — MIDAZOLAM HYDROCHLORIDE 2 MG/2ML
INJECTION, SOLUTION INTRAMUSCULAR; INTRAVENOUS
Status: COMPLETED | OUTPATIENT
Start: 2023-02-27 | End: 2023-02-27

## 2023-02-27 RX ORDER — FENTANYL CITRATE 50 UG/ML
INJECTION, SOLUTION INTRAMUSCULAR; INTRAVENOUS
Status: COMPLETED | OUTPATIENT
Start: 2023-02-27 | End: 2023-02-27

## 2023-02-27 RX ADMIN — LIDOCAINE HYDROCHLORIDE 5 ML: 40 SOLUTION RETROBULBAR; TOPICAL at 12:23

## 2023-02-27 RX ADMIN — MIDAZOLAM HYDROCHLORIDE 1 MG: 1 INJECTION, SOLUTION INTRAMUSCULAR; INTRAVENOUS at 12:14

## 2023-02-27 RX ADMIN — FENTANYL CITRATE 50 MCG: 50 INJECTION, SOLUTION INTRAMUSCULAR; INTRAVENOUS at 12:14

## 2023-02-27 RX ADMIN — LIDOCAINE HYDROCHLORIDE 5 ML: 10 INJECTION, SOLUTION EPIDURAL; INFILTRATION; INTRACAUDAL at 12:15

## 2023-02-27 RX ADMIN — LIDOCAINE HYDROCHLORIDE 5 ML: 10 INJECTION, SOLUTION EPIDURAL; INFILTRATION; INTRACAUDAL at 12:25

## 2023-02-27 ASSESSMENT — PAIN DESCRIPTION - DESCRIPTORS: DESCRIPTORS: ACHING;SHOOTING

## 2023-02-27 ASSESSMENT — PAIN - FUNCTIONAL ASSESSMENT
PAIN_FUNCTIONAL_ASSESSMENT: NONE - DENIES PAIN
PAIN_FUNCTIONAL_ASSESSMENT: 0-10

## 2023-02-27 NOTE — OP NOTE
Preoperative Diagnosis: Lumbar spondylosis w/o myelopathy, chronic low back pain  Postoperative Diagnosis: Lumbar spondylosis w/o myelopathy, chronic low back pain  SEDATION: SEE SEDATION NOTE  BLOOD LOSS: NONE    Procedure Performed:  :Radiofrequency ablation of median branches at the Transverse processes of L3 / L4 / L5 for L3/4 AND L4/5 facet joints on Bilateral under fluoroscopic guidance with IV sedation. t    Procedure:    After starting an IV, the patient was taken to procedure room. The patient was placed in prone position and skin over the back was prepped and draped in sterile manner. Standard monitors were connected and vitals were monitored during the case and they remained stable during the procedure. A meaningful communication was kept up with the patient throughout the procedure. Then using fluoroscopy the junction of the transverse process of the target vertebra with the superior process of the facet joint was observed and the view was optimized. The skin and deep tissues were infiltrated with 2 ml of  1 % lidocaine. The RF canula with the 10 mm active tip was introduced through the skin wheal under fluoroscopy guidance such that the tip of the needle lies in the groove of the transverse process with the superior processes of the facet joint. Then a lateral and AP view of the lumbar spine was obtained to make sure the tip of needle is not in the neural foramen. Then electric impedence was checked to make sure it is acceptable. Then a sensory stimulus was applied at 50 Hz up to 0.5 volt and concordant pain symptoms were reproduced. Then a motor stimulus was applied at 2 Hz up to 2 volts or 3 x times the sensory stimulus and no motor stimulation was seen in lower extremities. Some multifidus stimulus was seen. Then after negative aspiration 1 ml of 4% lidocaine was injected through the needle at each level. The radiofrequency lesion was done at 85 degrees centigrade for 110 seconds. Patient's vital signs and neurological status remained stable throughout the procedure and post procedural period. The patient tolerated the procedure well and was discharged home in stable condition. SEDATION NOTE:    ASA CLASSIFICATION  3  MP   CLASSIFICATION  3    Moderate intravenous conscious sedation was supervised by Dr. Darion Lundberg  The patient was independently monitored by a Registered Nurse assigned to the Procedure Room  Monitoring included automated blood pressure, continuous EKG, Capnography and continuous pulse oximetry. The detailed Conscious Record is permanently stored in the [facility] .      The following is the conscious sedation record;  Start Time:  1210  End times:  1235  Duration:  25 MINUTES  MEDS GIVEN 1 MG VERSED AND 50 MCG FENTANYL

## 2023-02-27 NOTE — DISCHARGE INSTRUCTIONS
Discharge Instructions following Sedation or Anesthesia:  You have  received  a sedative/anesthetic therefore, you should not consume any alcoholic beverages for minimum of 12 hours. Do not drive or operate machinery for 24 hours. Do not sign legal documents for 24 hours. Dizziness, drowsiness, and unsteadiness may occur. Rest when need to. Increase diet as tolerated. Keep up on fluids if diet allows. General Instructions:  Do not take a tub bath for 72 hours after procedure (this includes hot tubs and swimming pools). You may shower, but avoid hot water to injection site. Avoid strenuous activity TODAY especially if you experience dizziness. Remove band-aid the next day. Wash off any residual iodine   Do not use heat, heating pad, or any other heating device over the injection site for 3 days after the procedure. If you experience pain after your procedure, you may continue with your current pain medication as prescribed. (DO NOT INCREASE YOUR PAIN MEDICATION WITHOUT TALKING TO DOCTOR)  Soreness and pain at injection site is common, may use ice to reduce soreness.     Call Gene Noe at [phone] if you experience:   Fever, chills or temperature over 100    Vomiting, Headache, persistent stiff neck, nausea, blurred vision   Difficulty in urinating or unable to urinate with 8 hours   Increase in weakness, numbness or loss of function   Increased redness, swelling or drainage at the injection site

## 2023-02-27 NOTE — H&P
Pain Pre-Op H&P Note    Abel Templeton MD    HPI: Alexey Mata  presents with     Back pain  Chronic onset more than 1 year ago located on the lumbar area  Aggravated with excessive activity and standing and walking  No associated changes in bladder or bowel control  No previous lumbar spine surgical history  Had recent MRI lumbar spine     Past Medical History:   Diagnosis Date    Arthritis     Diabetes mellitus ([address]     High cholesterol     Migraines     Vision abnormalities        Past Surgical History:   Procedure Laterality Date    APPENDECTOMY      CHOLECYSTECTOMY      COLONOSCOPY  10/12/07    HYSTERECTOMY (CERVIX STATUS UNKNOWN)      Ovaries removed    ORIF HUMERUS DECOMPRESSION Left 2010    PAIN MANAGEMENT PROCEDURE      SALPINGO-OOPHORECTOMY      TONSILLECTOMY AND ADENOIDECTOMY         Family History   Problem Relation Age of Onset    Breast Cancer Paternal Cousin         >46 yo    Cancer Neg Hx     Colon Cancer Neg Hx     Diabetes Neg Hx     Eclampsia Neg Hx     Hypertension Neg Hx     Ovarian Cancer Neg Hx      Labor Neg Hx     Spont Abortions Neg Hx     Stroke Neg Hx        Allergies   Allergen Reactions    Aspirin      Intolerance. Upsets stomach    Duricef [Cefadroxil] Hives    Lipitor [Atorvastatin Calcium]      Muscle pain    Macrobid [Nitrofurantoin Monohyd Macro] Hives    Sulfa Antibiotics          Current Outpatient Medications:     gabapentin (NEURONTIN) 100 MG capsule, Take 100 mg by mouth 3 times daily. , Disp: , Rfl:     ferrous sulfate (IRON 325) 325 (65 Fe) MG tablet, Take 325 mg by mouth daily (with breakfast) (Patient not taking: No sig reported), Disp: , Rfl:     levothyroxine (SYNTHROID) 50 MCG tablet, Take 50 mcg by mouth Daily, Disp: , Rfl:     acetaminophen (TYLENOL) 500 MG tablet, Take 500 mg by mouth every 6 hours as needed for Pain, Disp: , Rfl:     tiZANidine (ZANAFLEX) 2 MG tablet, Take 1 tablet by mouth every 8 hours as needed (muscle spasms) (Patient not taking: No sig reported), Disp: 90 tablet, Rfl: 0    Multiple Vitamins-Minerals (ICAPS AREDS 2 PO), Take by mouth, Disp: , Rfl:     guaiFENesin (MUCINEX) 600 MG extended release tablet, Take 1,200 mg by mouth 2 times daily (Patient not taking: No sig reported), Disp: , Rfl:     carvedilol (COREG) 25 MG tablet, Take 25 mg by mouth 2 times daily (with meals), Disp: , Rfl:     benazepril (LOTENSIN) 10 MG tablet, Take 10 mg by mouth daily, Disp: , Rfl:     traMADol (ULTRAM) 50 MG tablet, Take 50 mg by mouth every 6 hours as needed for Pain., Disp: , Rfl:     simvastatin (ZOCOR) 40 MG tablet, Take 40 mg by mouth nightly, Disp: , Rfl:     celecoxib (CELEBREX) 200 MG capsule, Take 200 mg by mouth daily., Disp: , Rfl:     metFORMIN (GLUCOPHAGE) 500 MG tablet, Take 500 mg by mouth daily (with breakfast)., Disp: , Rfl:     omeprazole (PRILOSEC) 20 MG capsule, Take 20 mg by mouth daily., Disp: , Rfl:     Multiple Vitamins-Minerals (PX SENIOR VITAMIN PO), Take  by mouth., Disp: , Rfl:     Calcium Carbonate-Vitamin D 600-400 MG-UNIT CHEW, Take  by mouth 2 times daily., Disp: , Rfl:     Social History     Tobacco Use    Smoking status: Never    Smokeless tobacco: Never   Substance Use Topics    Alcohol use: No     Alcohol/week: 0.0 standard drinks       Review of Systems:   Focused review of systems was performed, and negative as pertinent to diagnosis, except as stated in HPI.      Physical Exam  Constitutional:       Appearance: Normal appearance.   Pulmonary:      Effort: Pulmonary effort is normal.   Neurological:      Mental Status: alert.   Psychiatric:         Attention and Perception: Attention and perception normal.         Mood and Affect: Mood and affect normal.   Cardiovascular:      Rate: Normal rate.         ASA: 3          Mallampati: 3       Patient's current physical status, medications, medical history, and HPI have been reviewed and updated as appropriate on this date: 02/27/23    Risk/Benefit(s): The  risks, benefits, alternatives, and potential complications have been discussed with the patient/family and informed consent has been obtained for the procedure/sedation. Diagnosis:   1.  Lumbosacral spondylosis without myelopathy            Plan:   Bilateral lumbar medial branch nerve Rfa at [address] Sumi Farooq MD

## 2023-03-13 ENCOUNTER — HOSPITAL ENCOUNTER (OUTPATIENT)
Dept: PAIN MANAGEMENT | Age: 83
Discharge: HOME | End: 2023-03-13
Payer: MEDICARE

## 2023-03-13 VITALS
OXYGEN SATURATION: 98 % | WEIGHT: 134.5 LBS | HEART RATE: 63 BPM | BODY MASS INDEX: 24.75 KG/M2 | SYSTOLIC BLOOD PRESSURE: 135 MMHG | HEIGHT: 62 IN | TEMPERATURE: 97.3 F | DIASTOLIC BLOOD PRESSURE: 54 MMHG | RESPIRATION RATE: 16 BRPM

## 2023-03-13 DIAGNOSIS — M54.16 LUMBAR RADICULOPATHY: ICD-10-CM

## 2023-03-13 DIAGNOSIS — M47.812 CERVICAL SPONDYLOSIS: ICD-10-CM

## 2023-03-13 DIAGNOSIS — M48.062 SPINAL STENOSIS OF LUMBAR REGION WITH NEUROGENIC CLAUDICATION: Primary | ICD-10-CM

## 2023-03-13 DIAGNOSIS — M47.817 LUMBOSACRAL SPONDYLOSIS WITHOUT MYELOPATHY: ICD-10-CM

## 2023-03-13 PROCEDURE — 99213 OFFICE O/P EST LOW 20 MIN: CPT

## 2023-03-13 PROCEDURE — 99213 OFFICE O/P EST LOW 20 MIN: CPT | Performed by: NURSE PRACTITIONER

## 2023-03-13 ASSESSMENT — ENCOUNTER SYMPTOMS
BOWEL INCONTINENCE: 0
SHORTNESS OF BREATH: 0
CONSTIPATION: 0
BACK PAIN: 1
COUGH: 0

## 2023-03-13 ASSESSMENT — PAIN SCALES - GENERAL: PAINLEVEL_OUTOF10: 3

## 2023-03-13 ASSESSMENT — PAIN DESCRIPTION - LOCATION: LOCATION: BACK

## 2023-03-13 NOTE — PROGRESS NOTES
Chief Complaint   Patient presents with    Back Pain        PMH  Pt is here for follow up after bilateral lumbar RFA 2/27/23. She reports significant relief and continues to improve daily. She states she is tolerating prolonged standing following the procedure. She has a referral to neurology from Dr. Kris Costa for occipital blocks. Has been unable to get an appointment for this. She is requesting to see Dr. Sarah Ferrer for this procedure. Back Pain  This is a chronic problem. The current episode started more than 1 year ago. The problem occurs constantly. The problem has been gradually improving since onset. The pain is present in the lumbar spine. The quality of the pain is described as aching and burning. The pain radiates to the left knee and left thigh. The pain is at a severity of 3/10. The symptoms are aggravated by standing. Pertinent negatives include no bladder incontinence, bowel incontinence, chest pain, fever, numbness, tingling or weakness. She has tried bed rest for the symptoms. Patient denies any new neurological symptoms. No bowel or bladder incontinence, no weakness, and no falling. Past Medical History:   Diagnosis Date    Arthritis     Diabetes mellitus ([address]     High cholesterol     Migraines     Vision abnormalities        Past Surgical History:   Procedure Laterality Date    APPENDECTOMY  1949    CHOLECYSTECTOMY  1971    COLONOSCOPY  10/12/07    HYSTERECTOMY (CERVIX STATUS UNKNOWN)  1983    Ovaries removed    ORIF HUMERUS DECOMPRESSION Left 2010    PAIN MANAGEMENT PROCEDURE      SALPINGO-OOPHORECTOMY  1983    TONSILLECTOMY AND ADENOIDECTOMY  1962       Allergies   Allergen Reactions    Aspirin      Intolerance.  Upsets stomach    Duricef [Cefadroxil] Hives    Lipitor [Atorvastatin Calcium]      Muscle pain    Macrobid [Nitrofurantoin Monohyd Macro] Hives    Sulfa Antibiotics          Current Outpatient Medications:     gabapentin (NEURONTIN) 100 MG capsule, Take 100 mg by mouth 3 times daily. , Disp: , Rfl:     ferrous sulfate (IRON 325) 325 (65 Fe) MG tablet, Take 325 mg by mouth daily (with breakfast) (Patient not taking: No sig reported), Disp: , Rfl:     levothyroxine (SYNTHROID) 50 MCG tablet, Take 50 mcg by mouth Daily, Disp: , Rfl:     acetaminophen (TYLENOL) 500 MG tablet, Take 500 mg by mouth every 6 hours as needed for Pain, Disp: , Rfl:     tiZANidine (ZANAFLEX) 2 MG tablet, Take 1 tablet by mouth every 8 hours as needed (muscle spasms) (Patient not taking: No sig reported), Disp: 90 tablet, Rfl: 0    Multiple Vitamins-Minerals (ICAPS AREDS 2 PO), Take by mouth, Disp: , Rfl:     guaiFENesin (MUCINEX) 600 MG extended release tablet, Take 1,200 mg by mouth 2 times daily (Patient not taking: No sig reported), Disp: , Rfl:     carvedilol (COREG) 25 MG tablet, Take 25 mg by mouth 2 times daily (with meals), Disp: , Rfl:     benazepril (LOTENSIN) 10 MG tablet, Take 10 mg by mouth daily, Disp: , Rfl:     traMADol (ULTRAM) 50 MG tablet, Take 50 mg by mouth every 6 hours as needed for Pain., Disp: , Rfl:     simvastatin (ZOCOR) 40 MG tablet, Take 40 mg by mouth nightly, Disp: , Rfl:     celecoxib (CELEBREX) 200 MG capsule, Take 200 mg by mouth daily. , Disp: , Rfl:     metFORMIN (GLUCOPHAGE) 500 MG tablet, Take 500 mg by mouth daily (with breakfast). , Disp: , Rfl:     omeprazole (PRILOSEC) 20 MG capsule, Take 20 mg by mouth daily. , Disp: , Rfl:     Multiple Vitamins-Minerals (PX SENIOR VITAMIN PO), Take  by mouth., Disp: , Rfl:     Calcium Carbonate-Vitamin D 600-400 MG-UNIT CHEW, Take  by mouth 2 times daily. , Disp: , Rfl:     Family History   Problem Relation Age of Onset    Breast Cancer Paternal Cousin         >48 yo    Cancer Neg Hx     Colon Cancer Neg Hx     Diabetes Neg Hx     Eclampsia Neg Hx     Hypertension Neg Hx     Ovarian Cancer Neg Hx      Labor Neg Hx     Spont Abortions Neg Hx     Stroke Neg Hx        Social History     Socioeconomic History    Marital status:      Spouse name: Not on file    Number of children: Not on file    Years of education: Not on file    Highest education level: Not on file   Occupational History    Not on file   Tobacco Use    Smoking status: Never    Smokeless tobacco: Never   Vaping Use    Vaping Use: Never used   Substance and Sexual Activity    Alcohol use: No     Alcohol/week: 0.0 standard drinks    Drug use: No    Sexual activity: Yes     Partners: Male     Birth control/protection: Surgical, Post-menopausal     Comment: hysterectomy BSO   Other Topics Concern    Not on file   Social History Narrative    Not on file     Social Determinants of Health     Financial Resource Strain: Not on file   Food Insecurity: Not on file   Transportation Needs: Not on file   Physical Activity: Not on file   Stress: Not on file   Social Connections: Not on file   Intimate Partner Violence: Not on file   Housing Stability: Not on file       Review of Systems:  Review of Systems   Constitutional: Negative for chills and fever. Cardiovascular:  Negative for chest pain and palpitations. Respiratory:  Negative for cough and shortness of breath. Musculoskeletal:  Positive for back pain. Gastrointestinal:  Negative for bowel incontinence and constipation. Genitourinary:  Negative for bladder incontinence. Neurological:  Negative for disturbances in coordination, loss of balance, numbness, tingling and weakness. Physical Exam:  BP (!) 135/54   Pulse 63   Temp 97.3 °F (36.3 °C)   Resp 16   Ht 5' 2\" (1.575 m)   Wt 134 lb 8 oz (61 kg)   LMP  (LMP Unknown)   SpO2 98%   BMI 24.60 kg/m²     Physical Exam  HENT:      Head: Normocephalic. Pulmonary:      Effort: Pulmonary effort is normal.   Musculoskeletal:         General: Normal range of motion. Cervical back: Normal range of motion. Lumbar back: Tenderness present. Skin:     General: Skin is warm and dry.    Neurological:      Mental Status: She is alert and oriented to person, place, and time. Record/Diagnostics Review:    FINDINGS:   BONES/ALIGNMENT: There is levocurvature of the lumbar spine. The vertebral   body heights are maintained. There is age-appropriate bone marrow signal.   There is multilevel degenerative disc disease with loss of disc signal.   There is disc space narrowing in the mid and lower lumbar spine. There is no   spondylolisthesis. SPINAL CORD: The conus is normal in caliber and signal and terminates at the   L1-2 level. The cauda equina is unremarkable. SOFT TISSUES: The posterior paraspinal soft tissues are unremarkable. The   visualized abdominal structures are unremarkable. L1-L2: There is a circumferential disc bulge with facet hypertrophy. There   is no canal stenosis or foraminal narrowing. L2-L3: There is a circumferential disc bulge with facet and ligamentous   hypertrophy. There is no canal stenosis or foraminal narrowing. L3-L4: There is a circumferential disc bulge with facet and ligamentous   hypertrophy. There is no canal stenosis or left foraminal narrowing. There   is severe right foraminal narrowing. L4-L5: There is a circumferential disc bulge with facet and ligamentous   hypertrophy. There is canal stenosis measuring 9 mm in AP dimension. There   is moderate right and severe left foraminal narrowing. L5-S1: There is a circumferential disc bulge with facet and ligamentous   hypertrophy. There is no canal stenosis. There is mild right and severe   left foraminal narrowing. Impression   Levocurvature with multilevel degenerative disc disease and multilevel   degenerative facet/ligamentous hypertrophy resulting in mild canal stenosis   at L4-5. Bilateral foraminal narrowing as described above.            Assessment:  Problem List Items Addressed This Visit       Spinal stenosis of lumbar region with neurogenic claudication - Primary (Chronic)    Lumbar radiculopathy Lumbosacral spondylosis without myelopathy    Cervical spondylosis          Treatment Plan:  Significant relief following lumbar RFA - 75% relief and improving    She continues to complain of pain in the neck and head - did see Dr. Osman Seen and has a referral to neurology for occipital neuralgia for blocks but could not get an appointment   She would like an OV with Dr. Rachid Mathias to discuss occipital blocks    I have reviewed the chief complaint and history of present illness (including ROS and [facility] [place] [facility]) and vital documentation by my staff and I agree with their documentation and have added where applicable.

## 2023-03-13 NOTE — PROGRESS NOTES
Chief Complaint   Patient presents with    Back Pain        PM       HPI:     Back Pain  This is a chronic problem. The current episode started more than 1 year ago. The problem occurs constantly. The problem has been gradually improving since onset. The pain is present in the lumbar spine. The quality of the pain is described as aching and burning. The pain radiates to the left knee and left thigh. The pain is at a severity of 3/10. The symptoms are aggravated by standing. Pertinent negatives include no bladder incontinence, bowel incontinence, numbness, tingling or weakness. She has tried bed rest for the symptoms. Patient denies any new neurological symptoms. No bowel or bladder incontinence, no weakness, and no falling. Past Medical History:   Diagnosis Date    Arthritis     Diabetes mellitus ([facility] [address]     High cholesterol     Migraines     Vision abnormalities        Past Surgical History:   Procedure Laterality Date    APPENDECTOMY  1949    CHOLECYSTECTOMY  1971    COLONOSCOPY  10/12/07    HYSTERECTOMY (CERVIX STATUS UNKNOWN)  1983    Ovaries removed    ORIF HUMERUS DECOMPRESSION Left 2010    PAIN MANAGEMENT PROCEDURE      SALPINGO-OOPHORECTOMY  1983    TONSILLECTOMY AND ADENOIDECTOMY  1962       Allergies   Allergen Reactions    Aspirin      Intolerance. Upsets stomach    Duricef [Cefadroxil] Hives    Lipitor [Atorvastatin Calcium]      Muscle pain    Macrobid [Nitrofurantoin Monohyd Macro] Hives    Sulfa Antibiotics          Current Outpatient Medications:     gabapentin (NEURONTIN) 100 MG capsule, Take 100 mg by mouth 3 times daily. , Disp: , Rfl:     ferrous sulfate (IRON 325) 325 (65 Fe) MG tablet, Take 325 mg by mouth daily (with breakfast) (Patient not taking: No sig reported), Disp: , Rfl:     levothyroxine (SYNTHROID) 50 MCG tablet, Take 50 mcg by mouth Daily, Disp: , Rfl:     acetaminophen (TYLENOL) 500 MG tablet, Take 500 mg by mouth every 6 hours as needed for Pain, Disp: , Rfl: tiZANidine (ZANAFLEX) 2 MG tablet, Take 1 tablet by mouth every 8 hours as needed (muscle spasms) (Patient not taking: No sig reported), Disp: 90 tablet, Rfl: 0    Multiple Vitamins-Minerals (ICAPS AREDS 2 PO), Take by mouth, Disp: , Rfl:     guaiFENesin (MUCINEX) 600 MG extended release tablet, Take 1,200 mg by mouth 2 times daily (Patient not taking: No sig reported), Disp: , Rfl:     carvedilol (COREG) 25 MG tablet, Take 25 mg by mouth 2 times daily (with meals), Disp: , Rfl:     benazepril (LOTENSIN) 10 MG tablet, Take 10 mg by mouth daily, Disp: , Rfl:     traMADol (ULTRAM) 50 MG tablet, Take 50 mg by mouth every 6 hours as needed for Pain., Disp: , Rfl:     simvastatin (ZOCOR) 40 MG tablet, Take 40 mg by mouth nightly, Disp: , Rfl:     celecoxib (CELEBREX) 200 MG capsule, Take 200 mg by mouth daily. , Disp: , Rfl:     metFORMIN (GLUCOPHAGE) 500 MG tablet, Take 500 mg by mouth daily (with breakfast). , Disp: , Rfl:     omeprazole (PRILOSEC) 20 MG capsule, Take 20 mg by mouth daily. , Disp: , Rfl:     Multiple Vitamins-Minerals (PX SENIOR VITAMIN PO), Take  by mouth., Disp: , Rfl:     Calcium Carbonate-Vitamin D 600-400 MG-UNIT CHEW, Take  by mouth 2 times daily. , Disp: , Rfl:     Family History   Problem Relation Age of Onset    Breast Cancer Paternal Cousin         >46 yo    Cancer Neg Hx     Colon Cancer Neg Hx     Diabetes Neg Hx     Eclampsia Neg Hx     Hypertension Neg Hx     Ovarian Cancer Neg Hx      Labor Neg Hx     Spont Abortions Neg Hx     Stroke Neg Hx        Social History     Socioeconomic History    Marital status:      Spouse name: Not on file    Number of children: Not on file    Years of education: Not on file    Highest education level: Not on file   Occupational History    Not on file   Tobacco Use    Smoking status: Never    Smokeless tobacco: Never   Vaping Use    Vaping Use: Never used   Substance and Sexual Activity    Alcohol use: No     Alcohol/week: 0.0 standard drinks Drug use: No    Sexual activity: Yes     Partners: Male     Birth control/protection: Surgical, Post-menopausal     Comment: hysterectomy BSO   Other Topics Concern    Not on file   Social History Narrative    Not on file     Social Determinants of Health     Financial Resource Strain: Not on file   Food Insecurity: Not on file   Transportation Needs: Not on file   Physical Activity: Not on file   Stress: Not on file   Social Connections: Not on file   Intimate Partner Violence: Not on file   Housing Stability: Not on file       Review of Systems:  Review of Systems   Musculoskeletal:  Positive for back pain. Gastrointestinal:  Negative for bowel incontinence. Genitourinary:  Negative for bladder incontinence. Neurological:  Negative for numbness, tingling and weakness. Physical Exam:  LMP  (LMP Unknown)     Physical Exam    Record/Diagnostics Review:    Last cecy  and was appropriate     Assessment:  Problem List Items Addressed This Visit          Other    Spinal stenosis of lumbar region with neurogenic claudication - Primary (Chronic)    Cervical spondylosis          Treatment Plan:  Patient relates current medications are helping the pain. Patient reports taking pain medications as prescribed, denies obtaining medications from different sources and denies use of illegal drugs. Medication risk and benefits have been discussed. Patient denies side effects from medications like nausea, vomiting, constipation or drowsiness. Patient reports current activities of daily living are possible due to medications and would like to continue them. As always, we encourage daily stretching and strengthening exercises, and recommend minimizing use of pain medications unless patient cannot get through daily activities due to pain. Due to the high risk nature of this patient's pain medication close monitoring is required. Continue current medication management, pt has been stable and compliant.   Script written for  Follow up appointment made for 4 weeks    I have reviewed the chief complaint and history of present illness (including ROS and PFSH) and vital documentation by my staff and I agree with their documentation and have added where applicable.

## 2023-03-17 ENCOUNTER — TELEPHONE (OUTPATIENT)
Dept: PAIN MANAGEMENT | Age: 83
End: 2023-03-17

## 2023-03-20 ENCOUNTER — TELEPHONE (OUTPATIENT)
Dept: PAIN MANAGEMENT | Age: 83
End: 2023-03-20

## 2023-03-20 DIAGNOSIS — M47.812 CERVICAL SPONDYLOSIS: ICD-10-CM

## 2023-03-20 DIAGNOSIS — M54.81 OCCIPITAL NEURALGIA OF RIGHT SIDE: Primary | ICD-10-CM

## 2023-03-20 NOTE — TELEPHONE ENCOUNTER
Patient calls and states she would like to see Dr. Reese Franco.  [facility] neurologist.  Will route to [employer] CNP

## 2023-03-30 ENCOUNTER — HOSPITAL ENCOUNTER (OUTPATIENT)
Dept: PAIN MANAGEMENT | Age: 83
Discharge: HOME | End: 2023-03-30
Payer: MEDICARE

## 2023-03-30 VITALS
SYSTOLIC BLOOD PRESSURE: 160 MMHG | HEART RATE: 62 BPM | OXYGEN SATURATION: 98 % | HEIGHT: 62 IN | WEIGHT: 134 LBS | DIASTOLIC BLOOD PRESSURE: 55 MMHG | BODY MASS INDEX: 24.66 KG/M2

## 2023-03-30 DIAGNOSIS — M54.81 OCCIPITAL NEURALGIA OF RIGHT SIDE: ICD-10-CM

## 2023-03-30 DIAGNOSIS — M47.812 CERVICAL SPONDYLOSIS: ICD-10-CM

## 2023-03-30 DIAGNOSIS — M48.062 SPINAL STENOSIS OF LUMBAR REGION WITH NEUROGENIC CLAUDICATION: Primary | ICD-10-CM

## 2023-03-30 DIAGNOSIS — M47.817 LUMBOSACRAL SPONDYLOSIS WITHOUT MYELOPATHY: ICD-10-CM

## 2023-03-30 PROCEDURE — 99214 OFFICE O/P EST MOD 30 MIN: CPT | Performed by: ANESTHESIOLOGY

## 2023-03-30 PROCEDURE — 99213 OFFICE O/P EST LOW 20 MIN: CPT

## 2023-03-30 ASSESSMENT — ENCOUNTER SYMPTOMS
DIARRHEA: 0
NAUSEA: 0
CONSTIPATION: 0
VOMITING: 0
BACK PAIN: 0

## 2023-03-30 NOTE — PROGRESS NOTES
Smoking status: Never    Smokeless tobacco: Never   Vaping Use    Vaping Use: Never used   Substance and Sexual Activity    Alcohol use: No     Alcohol/week: 0.0 standard drinks    Drug use: No    Sexual activity: Yes     Partners: Male     Birth control/protection: Surgical, Post-menopausal     Comment: hysterectomy BSO       Family History   Problem Relation Age of Onset    Breast Cancer Paternal Cousin         >48 yo    Cancer Neg Hx     Colon Cancer Neg Hx     Diabetes Neg Hx     Eclampsia Neg Hx     Hypertension Neg Hx     Ovarian Cancer Neg Hx      Labor Neg Hx     Spont Abortions Neg Hx     Stroke Neg Hx        Allergies   Allergen Reactions    Aspirin      Intolerance. Upsets stomach    Duricef [Cefadroxil] Hives    Lipitor [Atorvastatin Calcium]      Muscle pain    Macrobid [Nitrofurantoin Monohyd Macro] Hives    Sulfa Antibiotics        Vitals:    23 1006   BP: (!) 160/55   Pulse: 62   SpO2: 98%       Current Outpatient Medications   Medication Sig Dispense Refill    gabapentin (NEURONTIN) 100 MG capsule Take 100 mg by mouth 3 times daily.       ferrous sulfate (IRON 325) 325 (65 Fe) MG tablet Take 325 mg by mouth daily (with breakfast) (Patient not taking: No sig reported)      levothyroxine (SYNTHROID) 50 MCG tablet Take 50 mcg by mouth Daily      acetaminophen (TYLENOL) 500 MG tablet Take 500 mg by mouth every 6 hours as needed for Pain      tiZANidine (ZANAFLEX) 2 MG tablet Take 1 tablet by mouth every 8 hours as needed (muscle spasms) (Patient not taking: No sig reported) 90 tablet 0    Multiple Vitamins-Minerals (ICAPS AREDS 2 PO) Take by mouth      guaiFENesin (MUCINEX) 600 MG extended release tablet Take 1,200 mg by mouth 2 times daily (Patient not taking: No sig reported)      carvedilol (COREG) 25 MG tablet Take 25 mg by mouth 2 times daily (with meals)      benazepril (LOTENSIN) 10 MG tablet Take 10 mg by mouth daily      traMADol (ULTRAM) 50 MG tablet Take 50 mg by mouth every 6

## 2023-04-06 ENCOUNTER — HOSPITAL ENCOUNTER (OUTPATIENT)
Dept: PAIN MANAGEMENT | Age: 83
Discharge: HOME | End: 2023-04-06
Payer: MEDICARE

## 2023-04-06 VITALS
HEIGHT: 62 IN | DIASTOLIC BLOOD PRESSURE: 60 MMHG | OXYGEN SATURATION: 97 % | HEART RATE: 58 BPM | WEIGHT: 134 LBS | BODY MASS INDEX: 24.66 KG/M2 | SYSTOLIC BLOOD PRESSURE: 128 MMHG

## 2023-04-06 DIAGNOSIS — M48.062 SPINAL STENOSIS OF LUMBAR REGION WITH NEUROGENIC CLAUDICATION: Primary | Chronic | ICD-10-CM

## 2023-04-06 DIAGNOSIS — M47.812 CERVICAL SPONDYLOSIS: ICD-10-CM

## 2023-04-06 DIAGNOSIS — M54.81 OCCIPITAL NEURALGIA OF RIGHT SIDE: ICD-10-CM

## 2023-04-06 PROCEDURE — 99213 OFFICE O/P EST LOW 20 MIN: CPT

## 2023-04-06 PROCEDURE — 2500000003 HC RX 250 WO HCPCS: Performed by: ANESTHESIOLOGY

## 2023-04-06 PROCEDURE — 6360000002 HC RX W HCPCS: Performed by: ANESTHESIOLOGY

## 2023-04-06 PROCEDURE — 64405 NJX AA&/STRD GR OCPL NRV: CPT

## 2023-04-06 RX ORDER — BUPIVACAINE HYDROCHLORIDE 2.5 MG/ML
2 INJECTION, SOLUTION EPIDURAL; INFILTRATION; INTRACAUDAL ONCE
Status: COMPLETED | OUTPATIENT
Start: 2023-04-06 | End: 2023-04-06

## 2023-04-06 RX ORDER — TRIAMCINOLONE ACETONIDE 40 MG/ML
40 INJECTION, SUSPENSION INTRA-ARTICULAR; INTRAMUSCULAR ONCE
Status: COMPLETED | OUTPATIENT
Start: 2023-04-06 | End: 2023-04-06

## 2023-04-06 RX ADMIN — BUPIVACAINE HYDROCHLORIDE 5 MG: 2.5 INJECTION, SOLUTION EPIDURAL; INFILTRATION; INTRACAUDAL; PERINEURAL at 09:27

## 2023-04-06 RX ADMIN — TRIAMCINOLONE ACETONIDE 40 MG: 40 INJECTION, SUSPENSION INTRA-ARTICULAR; INTRAMUSCULAR at 09:31

## 2023-04-06 ASSESSMENT — ENCOUNTER SYMPTOMS
WHEEZING: 0
CONSTIPATION: 0
NAUSEA: 0
CHEST TIGHTNESS: 0
SHORTNESS OF BREATH: 0
DIARRHEA: 0
VOMITING: 0

## 2023-04-06 ASSESSMENT — PAIN DESCRIPTION - FREQUENCY: FREQUENCY: CONTINUOUS

## 2023-04-06 ASSESSMENT — PAIN DESCRIPTION - PAIN TYPE: TYPE: CHRONIC PAIN

## 2023-04-06 ASSESSMENT — PAIN SCALES - GENERAL: PAINLEVEL_OUTOF10: 6

## 2023-04-06 ASSESSMENT — PAIN DESCRIPTION - LOCATION: LOCATION: NECK

## 2023-04-06 ASSESSMENT — PAIN DESCRIPTION - DESCRIPTORS: DESCRIPTORS: BURNING

## 2023-04-06 NOTE — PROGRESS NOTES
The patient is a 78 y. o. Non- / non  female. Chief Complaint   Patient presents with    Neck Pain        HPI        Past Medical History:   Diagnosis Date    Arthritis     Diabetes mellitus ([address]     High cholesterol     Migraines     Vision abnormalities         Past Surgical History:   Procedure Laterality Date    APPENDECTOMY      CHOLECYSTECTOMY      COLONOSCOPY  10/12/07    HYSTERECTOMY (CERVIX STATUS UNKNOWN)      Ovaries removed    ORIF HUMERUS DECOMPRESSION Left 2010    PAIN MANAGEMENT PROCEDURE      SALPINGO-OOPHORECTOMY      TONSILLECTOMY AND ADENOIDECTOMY         Social History     Socioeconomic History    Marital status:      Spouse name: None    Number of children: None    Years of education: None    Highest education level: None   Tobacco Use    Smoking status: Never    Smokeless tobacco: Never   Vaping Use    Vaping Use: Never used   Substance and Sexual Activity    Alcohol use: No     Alcohol/week: 0.0 standard drinks    Drug use: No    Sexual activity: Yes     Partners: Male     Birth control/protection: Surgical, Post-menopausal     Comment: hysterectomy BSO       Family History   Problem Relation Age of Onset    Breast Cancer Paternal Cousin         >46 yo    Cancer Neg Hx     Colon Cancer Neg Hx     Diabetes Neg Hx     Eclampsia Neg Hx     Hypertension Neg Hx     Ovarian Cancer Neg Hx      Labor Neg Hx     Spont Abortions Neg Hx     Stroke Neg Hx        Allergies   Allergen Reactions    Aspirin      Intolerance. Upsets stomach    Duricef [Cefadroxil] Hives    Lipitor [Atorvastatin Calcium]      Muscle pain    Macrobid [Nitrofurantoin Monohyd Macro] Hives    Sulfa Antibiotics        Vitals:    23 0846   BP: 128/60   Pulse: 58   SpO2: 97%       Current Outpatient Medications   Medication Sig Dispense Refill    gabapentin (NEURONTIN) 100 MG capsule Take 100 mg by mouth 3 times daily.       ferrous sulfate (IRON 325) 325 (65 Fe) MG tablet Take
Lipitor [Atorvastatin Calcium]      Muscle pain    Macrobid [Nitrofurantoin Monohyd Macro] Hives    Sulfa Antibiotics        Vitals:    04/06/23 0846   Pulse: 58   SpO2: 97%       Current Outpatient Medications   Medication Sig Dispense Refill    gabapentin (NEURONTIN) 100 MG capsule Take 100 mg by mouth 3 times daily. ferrous sulfate (IRON 325) 325 (65 Fe) MG tablet Take 325 mg by mouth daily (with breakfast) (Patient not taking: No sig reported)      levothyroxine (SYNTHROID) 50 MCG tablet Take 50 mcg by mouth Daily      acetaminophen (TYLENOL) 500 MG tablet Take 500 mg by mouth every 6 hours as needed for Pain      tiZANidine (ZANAFLEX) 2 MG tablet Take 1 tablet by mouth every 8 hours as needed (muscle spasms) (Patient not taking: No sig reported) 90 tablet 0    Multiple Vitamins-Minerals (ICAPS AREDS 2 PO) Take by mouth      guaiFENesin (MUCINEX) 600 MG extended release tablet Take 1,200 mg by mouth 2 times daily (Patient not taking: No sig reported)      carvedilol (COREG) 25 MG tablet Take 25 mg by mouth 2 times daily (with meals)      benazepril (LOTENSIN) 10 MG tablet Take 10 mg by mouth daily      traMADol (ULTRAM) 50 MG tablet Take 50 mg by mouth every 6 hours as needed for Pain.      simvastatin (ZOCOR) 40 MG tablet Take 40 mg by mouth nightly      celecoxib (CELEBREX) 200 MG capsule Take 200 mg by mouth daily. metFORMIN (GLUCOPHAGE) 500 MG tablet Take 500 mg by mouth daily (with breakfast). omeprazole (PRILOSEC) 20 MG capsule Take 20 mg by mouth daily. Multiple Vitamins-Minerals (PX SENIOR VITAMIN PO) Take  by mouth. Calcium Carbonate-Vitamin D 600-400 MG-UNIT CHEW Take  by mouth 2 times daily. No current facility-administered medications for this encounter. Review of Systems   Constitutional:  Negative for chills, fatigue and fever. Respiratory:  Negative for chest tightness, shortness of breath and wheezing. Cardiovascular:  Negative for leg swelling.

## 2023-05-15 ENCOUNTER — HOSPITAL ENCOUNTER (OUTPATIENT)
Dept: PAIN MANAGEMENT | Age: 83
Discharge: HOME | End: 2023-05-15
Payer: MEDICARE

## 2023-05-15 VITALS
RESPIRATION RATE: 16 BRPM | HEART RATE: 62 BPM | SYSTOLIC BLOOD PRESSURE: 114 MMHG | DIASTOLIC BLOOD PRESSURE: 52 MMHG | HEIGHT: 62 IN | TEMPERATURE: 97.3 F | OXYGEN SATURATION: 95 % | BODY MASS INDEX: 25.03 KG/M2 | WEIGHT: 136 LBS

## 2023-05-15 DIAGNOSIS — M48.062 SPINAL STENOSIS OF LUMBAR REGION WITH NEUROGENIC CLAUDICATION: Primary | Chronic | ICD-10-CM

## 2023-05-15 DIAGNOSIS — M47.817 LUMBOSACRAL SPONDYLOSIS WITHOUT MYELOPATHY: ICD-10-CM

## 2023-05-15 DIAGNOSIS — M47.812 CERVICAL SPONDYLOSIS: ICD-10-CM

## 2023-05-15 DIAGNOSIS — M54.2 CERVICALGIA: ICD-10-CM

## 2023-05-15 PROCEDURE — 99213 OFFICE O/P EST LOW 20 MIN: CPT | Performed by: NURSE PRACTITIONER

## 2023-05-15 PROCEDURE — 99213 OFFICE O/P EST LOW 20 MIN: CPT

## 2023-05-15 ASSESSMENT — ENCOUNTER SYMPTOMS
BOWEL INCONTINENCE: 0
COUGH: 0
ABDOMINAL PAIN: 0
CONSTIPATION: 0
SHORTNESS OF BREATH: 0
BACK PAIN: 1

## 2023-05-15 ASSESSMENT — PAIN DESCRIPTION - LOCATION: LOCATION: BACK

## 2023-05-15 ASSESSMENT — PAIN SCALES - GENERAL: PAINLEVEL_OUTOF10: 7

## 2023-05-15 NOTE — PROGRESS NOTES
Rfl:     Multiple Vitamins-Minerals (PX SENIOR VITAMIN PO), Take  by mouth., Disp: , Rfl:     Calcium Carbonate-Vitamin D 600-400 MG-UNIT CHEW, Take  by mouth 2 times daily. , Disp: , Rfl:     Family History   Problem Relation Age of Onset    Breast Cancer Paternal Cousin         >46 yo    Cancer Neg Hx     Colon Cancer Neg Hx     Diabetes Neg Hx     Eclampsia Neg Hx     Hypertension Neg Hx     Ovarian Cancer Neg Hx      Labor Neg Hx     Spont Abortions Neg Hx     Stroke Neg Hx        Social History     Socioeconomic History    Marital status:      Spouse name: Not on file    Number of children: Not on file    Years of education: Not on file    Highest education level: Not on file   Occupational History    Not on file   Tobacco Use    Smoking status: Never    Smokeless tobacco: Never   Vaping Use    Vaping Use: Never used   Substance and Sexual Activity    Alcohol use: No     Alcohol/week: 0.0 standard drinks    Drug use: No    Sexual activity: Yes     Partners: Male     Birth control/protection: Surgical, Post-menopausal     Comment: hysterectomy BSO   Other Topics Concern    Not on file   Social History Narrative    Not on file     Social Determinants of Health     Financial Resource Strain: Not on file   Food Insecurity: Not on file   Transportation Needs: Not on file   Physical Activity: Not on file   Stress: Not on file   Social Connections: Not on file   Intimate Partner Violence: Not on file   Housing Stability: Not on file       Review of Systems:  Review of Systems   Constitutional: Negative for chills and fever. Cardiovascular:  Negative for chest pain and palpitations. Respiratory:  Negative for cough and shortness of breath. Musculoskeletal:  Positive for back pain and neck pain. Gastrointestinal:  Negative for abdominal pain, bowel incontinence and constipation. Genitourinary:  Negative for bladder incontinence.    Neurological:  Negative for disturbances in coordination, loss of

## 2023-05-15 NOTE — PROGRESS NOTES
Abortions Neg Hx     Stroke Neg Hx        Social History     Socioeconomic History    Marital status:      Spouse name: Not on file    Number of children: Not on file    Years of education: Not on file    Highest education level: Not on file   Occupational History    Not on file   Tobacco Use    Smoking status: Never    Smokeless tobacco: Never   Vaping Use    Vaping Use: Never used   Substance and Sexual Activity    Alcohol use: No     Alcohol/week: 0.0 standard drinks    Drug use: No    Sexual activity: Yes     Partners: Male     Birth control/protection: Surgical, Post-menopausal     Comment: hysterectomy BSO   Other Topics Concern    Not on file   Social History Narrative    Not on file     Social Determinants of Health     Financial Resource Strain: Not on file   Food Insecurity: Not on file   Transportation Needs: Not on file   Physical Activity: Not on file   Stress: Not on file   Social Connections: Not on file   Intimate Partner Violence: Not on file   Housing Stability: Not on file       Review of Systems:  Review of Systems   Musculoskeletal:  Positive for back pain and neck pain. Gastrointestinal:  Negative for abdominal pain and bowel incontinence. Genitourinary:  Negative for bladder incontinence. Neurological:  Negative for numbness, tingling and weakness. Physical Exam:  BP (!) 114/52   Pulse 62   Temp 97.3 °F (36.3 °C)   Resp 16   Ht 5' 2\" (1.575 m)   Wt 136 lb (61.7 kg)   LMP  (LMP Unknown)   SpO2 95%   BMI 24.87 kg/m²     Physical Exam    Record/Diagnostics Review:    Last cecy  and was appropriate     Assessment:  Problem List Items Addressed This Visit          Other    Spinal stenosis of lumbar region with neurogenic claudication - Primary (Chronic)    Cervical spondylosis          Treatment Plan:  Patient relates current medications are helping the pain.  Patient reports taking pain medications as prescribed, denies obtaining medications from different sources and denies

## 2023-07-24 ENCOUNTER — TELEPHONE (OUTPATIENT)
Dept: PAIN MANAGEMENT | Age: 83
End: 2023-07-24

## 2023-08-07 ENCOUNTER — HOSPITAL ENCOUNTER (OUTPATIENT)
Dept: PAIN MANAGEMENT | Age: 83
Discharge: HOME | End: 2023-08-07
Payer: MEDICARE

## 2023-08-07 VITALS
HEART RATE: 55 BPM | HEIGHT: 62 IN | SYSTOLIC BLOOD PRESSURE: 161 MMHG | DIASTOLIC BLOOD PRESSURE: 70 MMHG | BODY MASS INDEX: 25.03 KG/M2 | OXYGEN SATURATION: 99 % | WEIGHT: 136 LBS

## 2023-08-07 DIAGNOSIS — M47.817 LUMBOSACRAL SPONDYLOSIS WITHOUT MYELOPATHY: ICD-10-CM

## 2023-08-07 DIAGNOSIS — G89.29 CHRONIC LOW BACK PAIN, UNSPECIFIED BACK PAIN LATERALITY, UNSPECIFIED WHETHER SCIATICA PRESENT: ICD-10-CM

## 2023-08-07 DIAGNOSIS — M48.062 SPINAL STENOSIS OF LUMBAR REGION WITH NEUROGENIC CLAUDICATION: Primary | Chronic | ICD-10-CM

## 2023-08-07 DIAGNOSIS — M47.812 CERVICAL SPONDYLOSIS: ICD-10-CM

## 2023-08-07 DIAGNOSIS — M54.50 CHRONIC LOW BACK PAIN, UNSPECIFIED BACK PAIN LATERALITY, UNSPECIFIED WHETHER SCIATICA PRESENT: ICD-10-CM

## 2023-08-07 PROCEDURE — 99213 OFFICE O/P EST LOW 20 MIN: CPT

## 2023-08-07 PROCEDURE — 99214 OFFICE O/P EST MOD 30 MIN: CPT | Performed by: NURSE PRACTITIONER

## 2023-08-07 ASSESSMENT — ENCOUNTER SYMPTOMS
SHORTNESS OF BREATH: 0
BACK PAIN: 1
CONSTIPATION: 0
BOWEL INCONTINENCE: 0
COUGH: 0

## 2023-08-07 NOTE — H&P (VIEW-ONLY)
Chief Complaint   Patient presents with    Back Pain     Wants to discuss injection          PMH Pt complains of low back pain. MRI lumbar 10/2022 with multilevel degenerative changes. She had Radiofrequency ablation of median branches at the Transverse processes of L3 / L4 / L5 for L3/4 AND L4/5 facet joints 2/27/23 with significant relief. The pain has returned to baseline and she is requesting to repeat the procedure. She is scheduled for consult with neurology 10/4/23 for headaches. Back Pain  This is a chronic problem. The current episode started more than 1 year ago. The problem occurs constantly. The problem is unchanged. The pain is present in the lumbar spine. The quality of the pain is described as aching. The pain radiates to the left knee, left foot and left thigh. The pain is at a severity of 6/10. The pain is moderate. The pain is Worse during the day. The symptoms are aggravated by bending. Associated symptoms include leg pain. Pertinent negatives include no bladder incontinence, bowel incontinence, chest pain, fever, numbness, tingling or weakness. The treatment provided no relief. Patient denies any new neurological symptoms. No bowel or bladder incontinence, no weakness, and no falling. Past Medical History:   Diagnosis Date    Arthritis     Diabetes mellitus ([address])     High cholesterol     Migraines     Vision abnormalities        Past Surgical History:   Procedure Laterality Date    APPENDECTOMY  1949    CHOLECYSTECTOMY  1971    COLONOSCOPY  10/12/07    HYSTERECTOMY (CERVIX STATUS UNKNOWN)  1983    Ovaries removed    ORIF HUMERUS DECOMPRESSION Left 2010    PAIN MANAGEMENT PROCEDURE      SALPINGO-OOPHORECTOMY  1983    TONSILLECTOMY AND ADENOIDECTOMY  1962       Allergies   Allergen Reactions    Aspirin      Intolerance.  Upsets stomach    Duricef [Cefadroxil] Hives    Lipitor [Atorvastatin Calcium]      Muscle pain    Macrobid [Nitrofurantoin Monohyd Macro] Hives

## 2023-09-06 ENCOUNTER — HOSPITAL ENCOUNTER (OUTPATIENT)
Dept: PAIN MANAGEMENT | Facility: CLINIC | Age: 83
Discharge: HOME | End: 2023-09-06
Payer: MEDICARE

## 2023-09-06 VITALS
WEIGHT: 140 LBS | BODY MASS INDEX: 25.76 KG/M2 | RESPIRATION RATE: 10 BRPM | DIASTOLIC BLOOD PRESSURE: 67 MMHG | HEIGHT: 62 IN | TEMPERATURE: 96.7 F | SYSTOLIC BLOOD PRESSURE: 179 MMHG | OXYGEN SATURATION: 95 % | HEART RATE: 60 BPM

## 2023-09-06 DIAGNOSIS — M47.817 LUMBOSACRAL SPONDYLOSIS WITHOUT MYELOPATHY: Primary | ICD-10-CM

## 2023-09-06 DIAGNOSIS — R52 PAIN MANAGEMENT: ICD-10-CM

## 2023-09-06 LAB — GLUCOSE BLD-MCNC: 102 MG/DL (ref 65–105)

## 2023-09-06 PROCEDURE — 82947 ASSAY GLUCOSE BLOOD QUANT: CPT

## 2023-09-06 PROCEDURE — 64635 DESTROY LUMB/SAC FACET JNT: CPT

## 2023-09-06 PROCEDURE — 6360000002 HC RX W HCPCS: Performed by: ANESTHESIOLOGY

## 2023-09-06 PROCEDURE — 64636 DESTROY L/S FACET JNT ADDL: CPT

## 2023-09-06 PROCEDURE — 2500000003 HC RX 250 WO HCPCS: Performed by: ANESTHESIOLOGY

## 2023-09-06 RX ORDER — LIDOCAINE HYDROCHLORIDE 40 MG/ML
INJECTION, SOLUTION RETROBULBAR; TOPICAL
Status: COMPLETED | OUTPATIENT
Start: 2023-09-06 | End: 2023-09-06

## 2023-09-06 RX ORDER — MIDAZOLAM HYDROCHLORIDE 2 MG/2ML
INJECTION, SOLUTION INTRAMUSCULAR; INTRAVENOUS
Status: COMPLETED | OUTPATIENT
Start: 2023-09-06 | End: 2023-09-06

## 2023-09-06 RX ORDER — FENTANYL CITRATE 50 UG/ML
INJECTION, SOLUTION INTRAMUSCULAR; INTRAVENOUS
Status: COMPLETED | OUTPATIENT
Start: 2023-09-06 | End: 2023-09-06

## 2023-09-06 RX ORDER — LIDOCAINE HYDROCHLORIDE 10 MG/ML
INJECTION, SOLUTION EPIDURAL; INFILTRATION; INTRACAUDAL; PERINEURAL
Status: COMPLETED | OUTPATIENT
Start: 2023-09-06 | End: 2023-09-06

## 2023-09-06 RX ADMIN — LIDOCAINE HYDROCHLORIDE 5 ML: 10 INJECTION, SOLUTION EPIDURAL; INFILTRATION; INTRACAUDAL at 12:17

## 2023-09-06 RX ADMIN — LIDOCAINE HYDROCHLORIDE 5 ML: 10 INJECTION, SOLUTION EPIDURAL; INFILTRATION; INTRACAUDAL at 12:31

## 2023-09-06 RX ADMIN — FENTANYL CITRATE 50 MCG: 50 INJECTION, SOLUTION INTRAMUSCULAR; INTRAVENOUS at 12:17

## 2023-09-06 RX ADMIN — MIDAZOLAM HYDROCHLORIDE 1 MG: 1 INJECTION, SOLUTION INTRAMUSCULAR; INTRAVENOUS at 12:17

## 2023-09-06 RX ADMIN — LIDOCAINE HYDROCHLORIDE 5 ML: 40 SOLUTION RETROBULBAR; TOPICAL at 12:29

## 2023-09-06 ASSESSMENT — PAIN - FUNCTIONAL ASSESSMENT
PAIN_FUNCTIONAL_ASSESSMENT: 0-10
PAIN_FUNCTIONAL_ASSESSMENT: PREVENTS OR INTERFERES SOME ACTIVE ACTIVITIES AND ADLS
PAIN_FUNCTIONAL_ASSESSMENT: NONE - DENIES PAIN

## 2023-09-06 ASSESSMENT — PAIN DESCRIPTION - DESCRIPTORS: DESCRIPTORS: ACHING;SHARP

## 2023-09-06 NOTE — OP NOTE
vital signs and neurological status remained stable throughout the procedure and post procedural period. The patient tolerated the procedure well and was discharged home in stable condition. SEDATION NOTE:    ASA CLASSIFICATION  3  MP   CLASSIFICATION  3    Moderate intravenous conscious sedation was supervised by Dr. Terrell Elise  The patient was independently monitored by a Registered Nurse assigned to the Procedure Room  Monitoring included automated blood pressure, continuous EKG, Capnography and continuous pulse oximetry. The detailed Conscious Record is permanently stored in the [address] [place] [address].      The following is the conscious sedation record;  Start Time:  1210  End times:  1236  Duration:  16 minutes  MEDS GIVEN 1 MG VERSED AND 50 MCG FENTANYL

## 2023-09-06 NOTE — DISCHARGE INSTRUCTIONS
Discharge Instructions following Sedation or Anesthesia:  You have  received  a sedative/anesthetic therefore, you should not consume any alcoholic beverages for minimum of 12 hours. Do not drive or operate machinery for 24 hours. Do not sign legal documents for 24 hours. Dizziness, drowsiness, and unsteadiness may occur. Rest when need to. Increase diet as tolerated. Keep up on fluids if diet allows. General Instructions:  Do not take a tub bath for 72 hours after procedure (this includes hot tubs and swimming pools). You may shower, but avoid hot water to injection site. Avoid strenuous activity TODAY especially if you experience dizziness. Remove band-aid the next day. Wash off any residual iodine   Do not use heat, heating pad, or any other heating device over the injection site for 3 days after the procedure. If you experience pain after your procedure, you may continue with your current pain medication as prescribed. (DO NOT INCREASE YOUR PAIN MEDICATION WITHOUT TALKING TO DOCTOR)  Soreness and pain at injection site is common, may use ice to reduce soreness.     Call Geri at [phone] if you experience:   Fever, chills or temperature over 100    Vomiting, Headache, persistent stiff neck, nausea, blurred vision   Difficulty in urinating or unable to urinate with 8 hours   Increase in weakness, numbness or loss of function   Increased redness, swelling or drainage at the injection site

## 2023-09-06 NOTE — INTERVAL H&P NOTE
Update History & Physical    The patient's History and Physical of August 7, 2023 was reviewed with the patient and I examined the patient. There was no change. The surgical site was confirmed by the patient and me. Plan: The risks, benefits, expected outcome, and alternative to the recommended procedure have been discussed with the patient. Patient understands and wants to proceed with the procedure.      Asa 3  Mp 3    Electronically signed by Wagner Metzger MD on 9/6/2023 at 12:06 PM

## 2023-09-25 ENCOUNTER — TELEPHONE (OUTPATIENT)
Dept: NEUROLOGY | Age: 83
End: 2023-09-25

## 2023-09-25 ENCOUNTER — OFFICE VISIT (OUTPATIENT)
Dept: NEUROLOGY | Age: 83
End: 2023-09-25
Payer: MEDICARE

## 2023-09-25 VITALS
BODY MASS INDEX: 26.46 KG/M2 | WEIGHT: 143.8 LBS | DIASTOLIC BLOOD PRESSURE: 58 MMHG | HEIGHT: 62 IN | HEART RATE: 57 BPM | SYSTOLIC BLOOD PRESSURE: 159 MMHG

## 2023-09-25 DIAGNOSIS — M54.81 OCCIPITAL NEURALGIA OF RIGHT SIDE: Primary | ICD-10-CM

## 2023-09-25 PROCEDURE — 99204 OFFICE O/P NEW MOD 45 MIN: CPT | Performed by: STUDENT IN AN ORGANIZED HEALTH CARE EDUCATION/TRAINING PROGRAM

## 2023-09-25 PROCEDURE — 1123F ACP DISCUSS/DSCN MKR DOCD: CPT | Performed by: STUDENT IN AN ORGANIZED HEALTH CARE EDUCATION/TRAINING PROGRAM

## 2023-09-25 RX ORDER — GABAPENTIN 300 MG/1
300 CAPSULE ORAL 3 TIMES DAILY
COMMUNITY
Start: 2023-08-05

## 2023-09-25 NOTE — PROGRESS NOTES
100 MG capsule Take 1 capsule by mouth 3 times daily. levothyroxine (SYNTHROID) 50 MCG tablet Take 1 tablet by mouth Daily      acetaminophen (TYLENOL) 500 MG tablet Take 1 tablet by mouth every 6 hours as needed for Pain      Multiple Vitamins-Minerals (ICAPS AREDS 2 PO) Take by mouth      carvedilol (COREG) 25 MG tablet Take 1 tablet by mouth 2 times daily (with meals)      benazepril (LOTENSIN) 10 MG tablet Take 1 tablet by mouth daily      traMADol (ULTRAM) 50 MG tablet Take 1 tablet by mouth every 6 hours as needed for Pain.      simvastatin (ZOCOR) 40 MG tablet Take 1 tablet by mouth nightly      celecoxib (CELEBREX) 200 MG capsule Take 1 capsule by mouth daily      metFORMIN (GLUCOPHAGE) 500 MG tablet Take 1 tablet by mouth daily (with breakfast)      omeprazole (PRILOSEC) 20 MG capsule Take 1 capsule by mouth daily      Multiple Vitamins-Minerals (PX SENIOR VITAMIN PO) Take  by mouth. Calcium Carbonate-Vitamin D 600-400 MG-UNIT CHEW Take  by mouth 2 times daily. ferrous sulfate (IRON 325) 325 (65 Fe) MG tablet Take 325 mg by mouth daily (with breakfast) (Patient not taking: Reported on 6/20/2022)      tiZANidine (ZANAFLEX) 2 MG tablet Take 1 tablet by mouth every 8 hours as needed (muscle spasms) (Patient not taking: Reported on 9/25/2023) 90 tablet 0    guaiFENesin (MUCINEX) 600 MG extended release tablet Take 1,200 mg by mouth 2 times daily (Patient not taking: Reported on 10/31/2022)       No current facility-administered medications for this visit. Allergies   Allergen Reactions    Aspirin      Intolerance.  Upsets stomach    Duricef [Cefadroxil] Hives    Lipitor [Atorvastatin Calcium]      Muscle pain    Macrobid [Nitrofurantoin Monohyd Macro] Hives    Sulfa Antibiotics         REVIEW OF SYSTEMS:     Review of Systems       NEUROLOGICAL EXAMINATION:   VITALS  LMP  (LMP Unknown)      Mental status    Alert and oriented x 3; intact memory with no confusion, speech or language

## 2023-10-05 ENCOUNTER — HOSPITAL ENCOUNTER (OUTPATIENT)
Dept: PAIN MANAGEMENT | Age: 83
Discharge: HOME | End: 2023-10-05
Payer: MEDICARE

## 2023-10-05 VITALS
HEART RATE: 62 BPM | OXYGEN SATURATION: 62 % | SYSTOLIC BLOOD PRESSURE: 117 MMHG | BODY MASS INDEX: 26.46 KG/M2 | HEIGHT: 62 IN | WEIGHT: 143.8 LBS | DIASTOLIC BLOOD PRESSURE: 47 MMHG

## 2023-10-05 DIAGNOSIS — M48.062 SPINAL STENOSIS OF LUMBAR REGION WITH NEUROGENIC CLAUDICATION: Primary | Chronic | ICD-10-CM

## 2023-10-05 DIAGNOSIS — M54.2 CERVICALGIA: ICD-10-CM

## 2023-10-05 DIAGNOSIS — M54.16 LUMBAR RADICULOPATHY: ICD-10-CM

## 2023-10-05 DIAGNOSIS — M47.817 LUMBOSACRAL SPONDYLOSIS WITHOUT MYELOPATHY: ICD-10-CM

## 2023-10-05 DIAGNOSIS — M47.812 CERVICAL SPONDYLOSIS: ICD-10-CM

## 2023-10-05 PROCEDURE — 99213 OFFICE O/P EST LOW 20 MIN: CPT | Performed by: NURSE PRACTITIONER

## 2023-10-05 PROCEDURE — 99213 OFFICE O/P EST LOW 20 MIN: CPT

## 2023-10-05 ASSESSMENT — PAIN SCALES - GENERAL: PAINLEVEL_OUTOF10: 4

## 2023-10-05 ASSESSMENT — PAIN DESCRIPTION - PAIN TYPE: TYPE: CHRONIC PAIN

## 2023-10-05 ASSESSMENT — ENCOUNTER SYMPTOMS
COUGH: 0
CONSTIPATION: 0
SHORTNESS OF BREATH: 0
BACK PAIN: 1
BOWEL INCONTINENCE: 0

## 2023-10-05 ASSESSMENT — PAIN DESCRIPTION - FREQUENCY: FREQUENCY: CONTINUOUS

## 2023-10-05 ASSESSMENT — PAIN DESCRIPTION - LOCATION: LOCATION: BACK

## 2023-10-05 NOTE — PROGRESS NOTES
Chief Complaint   Patient presents with    Back Pain     PMH Pt is here for follow up after bilateral lumbar RFA 9/6/23. She reports 60% relief so far and continues to improve. She reports improved sleep and she is able to better tolerate prolonged walking following the procedure. Back Pain  This is a chronic problem. The current episode started more than 1 year ago. The problem occurs constantly. The problem is unchanged. The pain is present in the lumbar spine. The quality of the pain is described as aching. The pain radiates to the left foot, left thigh and left knee. The pain is at a severity of 4/10. The pain is moderate. The pain is Worse during the day. The symptoms are aggravated by bending, position, standing and twisting. Associated symptoms include leg pain. Pertinent negatives include no bladder incontinence, bowel incontinence, chest pain or fever. She has tried bed rest, home exercises, heat, ice and walking for the symptoms. The treatment provided no relief. Patient denies any new neurological symptoms. No bowel or bladder incontinence, no weakness, and no falling. Past Medical History:   Diagnosis Date    Arthritis     Diabetes mellitus ([address])     High cholesterol     Migraines     Vision abnormalities        Past Surgical History:   Procedure Laterality Date    APPENDECTOMY  1949    CHOLECYSTECTOMY  1971    COLONOSCOPY  10/12/07    HYSTERECTOMY (CERVIX STATUS UNKNOWN)  1983    Ovaries removed    ORIF HUMERUS DECOMPRESSION Left 2010    PAIN MANAGEMENT PROCEDURE      SALPINGO-OOPHORECTOMY  1983    TONSILLECTOMY AND ADENOIDECTOMY  1962       Allergies   Allergen Reactions    Aspirin      Intolerance.  Upsets stomach    Duricef [Cefadroxil] Hives    Lipitor [Atorvastatin Calcium]      Muscle pain    Macrobid [Nitrofurantoin Monohyd Macro] Hives    Sulfa Antibiotics          Current Outpatient Medications:     gabapentin (NEURONTIN) 300 MG capsule, Take 1 capsule by mouth 3 times

## 2024-01-10 ENCOUNTER — HOSPITAL ENCOUNTER (OUTPATIENT)
Dept: PAIN MANAGEMENT | Age: 84
Discharge: HOME | End: 2024-01-10
Payer: MEDICARE

## 2024-01-10 VITALS — HEIGHT: 62 IN | BODY MASS INDEX: 26.31 KG/M2 | WEIGHT: 143 LBS

## 2024-01-10 DIAGNOSIS — M54.16 LUMBAR RADICULOPATHY: ICD-10-CM

## 2024-01-10 DIAGNOSIS — M48.062 SPINAL STENOSIS OF LUMBAR REGION WITH NEUROGENIC CLAUDICATION: Primary | Chronic | ICD-10-CM

## 2024-01-10 DIAGNOSIS — M54.2 CERVICALGIA: ICD-10-CM

## 2024-01-10 DIAGNOSIS — M47.817 LUMBOSACRAL SPONDYLOSIS WITHOUT MYELOPATHY: ICD-10-CM

## 2024-01-10 DIAGNOSIS — M47.812 CERVICAL SPONDYLOSIS: ICD-10-CM

## 2024-01-10 PROCEDURE — 99213 OFFICE O/P EST LOW 20 MIN: CPT

## 2024-01-10 PROCEDURE — 99214 OFFICE O/P EST MOD 30 MIN: CPT | Performed by: NURSE PRACTITIONER

## 2024-01-10 ASSESSMENT — PAIN SCALES - GENERAL: PAINLEVEL_OUTOF10: 5

## 2024-01-10 ASSESSMENT — ENCOUNTER SYMPTOMS
BOWEL INCONTINENCE: 0
BACK PAIN: 1

## 2024-01-10 NOTE — PROGRESS NOTES
Chief Complaint   Patient presents with    Back Pain         PMH  Pt complains of low back pain. MRI lumbar 10/2022 with multilevel degenerative changes.  Pt had bilateral lumbar RFA 9/6/23. She reported 60% relief following the procedure. She reports improved sleep and she is able to better tolerate prolonged walking following the procedure. Today she states the pain is worsening and is now worse than before she had the RFA. She is taking tramadol, tylenol and gabapentin without relief. She has tried PT in the past with no change in the pain.      She did see neurology for headaches and plans to have occipital blocks again.     Back Pain  This is a chronic problem. The current episode started more than 1 year ago. The problem occurs constantly. The problem has been gradually worsening since onset. The pain is present in the lumbar spine. The pain radiates to the left thigh, left knee and left foot. The pain is at a severity of 5/10. The pain is Worse during the day. The symptoms are aggravated by bending, sitting and standing. Pertinent negatives include no bladder incontinence, bowel incontinence, chest pain, fever, headaches, numbness, tingling or weakness. She has tried ice and heat (RFA) for the symptoms. The treatment provided mild relief.     Patient denies any new neurological symptoms. No bowel or bladder incontinence, no weakness, and no falling.    Controlled Substance Monitoring:    Acute and Chronic Pain Monitoring:   RX Monitoring Periodic Controlled Substance Monitoring   1/10/2024  10:00 AM Possible medication side effects, risk of tolerance/dependence & alternative treatments discussed.;No signs of potential drug abuse or diversion identified.;Assessed functional status (ability to engage in work or other purposeful activities, the pain intensity and its interference with activities of daily living, quality of family life and social activities, and the physical activity);Obtaining

## 2024-02-05 ENCOUNTER — HOSPITAL ENCOUNTER (OUTPATIENT)
Dept: MRI IMAGING | Facility: CLINIC | Age: 84
Discharge: HOME | End: 2024-02-07
Payer: MEDICARE

## 2024-02-05 DIAGNOSIS — M54.16 LUMBAR RADICULOPATHY: ICD-10-CM

## 2024-02-05 DIAGNOSIS — M47.817 LUMBOSACRAL SPONDYLOSIS WITHOUT MYELOPATHY: ICD-10-CM

## 2024-02-05 PROCEDURE — 72148 MRI LUMBAR SPINE W/O DYE: CPT

## 2024-02-08 ENCOUNTER — HOSPITAL ENCOUNTER (OUTPATIENT)
Dept: PAIN MANAGEMENT | Age: 84
Discharge: HOME | End: 2024-02-08
Payer: MEDICARE

## 2024-02-08 VITALS — HEIGHT: 62 IN | WEIGHT: 143 LBS | BODY MASS INDEX: 26.31 KG/M2

## 2024-02-08 DIAGNOSIS — M48.062 SPINAL STENOSIS OF LUMBAR REGION WITH NEUROGENIC CLAUDICATION: Primary | Chronic | ICD-10-CM

## 2024-02-08 DIAGNOSIS — M47.817 LUMBOSACRAL SPONDYLOSIS WITHOUT MYELOPATHY: ICD-10-CM

## 2024-02-08 DIAGNOSIS — M54.16 LUMBAR RADICULOPATHY: ICD-10-CM

## 2024-02-08 PROCEDURE — 99213 OFFICE O/P EST LOW 20 MIN: CPT

## 2024-02-08 PROCEDURE — 99214 OFFICE O/P EST MOD 30 MIN: CPT | Performed by: ANESTHESIOLOGY

## 2024-02-08 ASSESSMENT — ENCOUNTER SYMPTOMS
BACK PAIN: 1
RESPIRATORY NEGATIVE: 1
EYES NEGATIVE: 1

## 2024-02-08 ASSESSMENT — PAIN SCALES - GENERAL: PAINLEVEL_OUTOF10: 8

## 2024-02-08 NOTE — H&P (VIEW-ONLY)
The patient is a 83 y.o.Non- / non  female.    Chief Complaint   Patient presents with    Back Pain        HPI    Bibi is a very pleasant 83-year-old female with history of chronic neck and low back pain  For neck pain she is diagnosed with chronic axial neck pain associated with neck movement  Imaging at shown severe cervical degenerative disc changes facet arthropathy and moderate stenosis  Neurosurgery did not advise for any surgery  She had occipital nerve block with limited benefit  Awaiting neurological evaluation by [facility] neurology    Main issue today is back pain  Pain today is located on the left side in the lower lumbar area extends over the hip and gluteal region and down in the left leg over the calf  Some intermittent numbness in the leg  No changes in bladder or bowel control  Pain aggravates with lifting bending  No particular injury associated with this flareup  She had flareup of sciatica in past  Had epidural injection in January 2023 that provided more than 50% relief for several months with improved activity tolerance  Had recent MRI lumbar spine  Report was reviewed  Images were reviewed independently  Finding discussed in detail with patient  Advanced degenerative changes with particularly at lower lumbar level with bilateral foraminal stenosis most severe at left L5 and right L4  She is a high risk candidate for any major spine surgery considering advanced age  Patient is not interested in surgical evaluation  Discussed repeat lumbar epidural  She is interested in  Patient have done multiple courses of physical therapy in past continuing to do home exercises  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

## 2024-02-08 NOTE — PROGRESS NOTES
The patient is a 83 y.o.Non- / non  female.    Chief Complaint   Patient presents with    Back Pain        HPI    Bibi is a very pleasant 83-year-old female with history of chronic neck and low back pain  For neck pain she is diagnosed with chronic axial neck pain associated with neck movement  Imaging at shown severe cervical degenerative disc changes facet arthropathy and moderate stenosis  Neurosurgery did not advise for any surgery  She had occipital nerve block with limited benefit  Awaiting neurological evaluation by [facility] neurology    Main issue today is back pain  Pain today is located on the left side in the lower lumbar area extends over the hip and gluteal region and down in the left leg over the calf  Some intermittent numbness in the leg  No changes in bladder or bowel control  Pain aggravates with lifting bending  No particular injury associated with this flareup  She had flareup of sciatica in past  Had epidural injection in January 2023 that provided more than 50% relief for several months with improved activity tolerance  Had recent MRI lumbar spine  Report was reviewed  Images were reviewed independently  Finding discussed in detail with patient  Advanced degenerative changes with particularly at lower lumbar level with bilateral foraminal stenosis most severe at left L5 and right L4  She is a high risk candidate for any major spine surgery considering advanced age  Patient is not interested in surgical evaluation  Discussed repeat lumbar epidural  She is interested in  Patient have done multiple courses of physical therapy in past continuing to do home exercises  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

## 2024-02-19 ENCOUNTER — PROCEDURE VISIT (OUTPATIENT)
Dept: NEUROLOGY | Age: 84
End: 2024-02-19
Payer: MEDICARE

## 2024-02-19 DIAGNOSIS — M54.81 OCCIPITAL NEURALGIA OF RIGHT SIDE: Primary | ICD-10-CM

## 2024-02-19 PROCEDURE — 64450 NJX AA&/STRD OTHER PN/BRANCH: CPT | Performed by: STUDENT IN AN ORGANIZED HEALTH CARE EDUCATION/TRAINING PROGRAM

## 2024-02-19 PROCEDURE — 64405 NJX AA&/STRD GR OCPL NRV: CPT | Performed by: STUDENT IN AN ORGANIZED HEALTH CARE EDUCATION/TRAINING PROGRAM

## 2024-02-19 RX ORDER — METHYLPREDNISOLONE SODIUM SUCCINATE 40 MG/ML
40 INJECTION, POWDER, LYOPHILIZED, FOR SOLUTION INTRAMUSCULAR; INTRAVENOUS ONCE
Status: COMPLETED | OUTPATIENT
Start: 2024-02-19 | End: 2024-02-19

## 2024-02-19 RX ADMIN — METHYLPREDNISOLONE SODIUM SUCCINATE 40 MG: 40 INJECTION, POWDER, LYOPHILIZED, FOR SOLUTION INTRAMUSCULAR; INTRAVENOUS at 15:46

## 2024-02-19 NOTE — PROGRESS NOTES
Procedure Note     Procedure: Bilateral greater and Lesser Occipital Nerve Block (CPT code 31660, 65313)     Indications:  Severe bilateral occipital neuralgia (ICD 10 M54.81)     Informed consent was obtained (explaining the procedure and risks and benefits) from patient. The signed consent form was placed in the medical record.     A time out was completed, verifying correct patient, procedure,site, positioning, and implants or special equipment.     Patient's left occipital area was palpated to identify location of the greater and the lesser occipital nerve. Using a 10 ml syringe, 7 ml of 2% lidocaine (from a 20 ml bottle) and 1 ml (40 mg) of methylprednisolone was aspirated. Alcohol was applied topically to the skin. Using a 27 gauge needle (aspirating during insertion),  2 ml was injected on the greater and lesser occipital nerve on the left side. Pressure with a gauze pad was held briefly upon the site of puncture to minimize bleeding and to further spread anaesthetic subcutaneously. The procedure was repeated on the right side. There were no complications.  Patient was comfortable and left without complaint.     Empty vial of methylprednisolone was discarded.      Kinza Landaverde MD   Neurology & Sleep Medicine  [facility] [place]

## 2024-03-06 ENCOUNTER — HOSPITAL ENCOUNTER (OUTPATIENT)
Dept: PAIN MANAGEMENT | Facility: CLINIC | Age: 84
Discharge: HOME | End: 2024-03-06
Payer: MEDICARE

## 2024-03-06 VITALS
SYSTOLIC BLOOD PRESSURE: 142 MMHG | HEART RATE: 59 BPM | RESPIRATION RATE: 10 BRPM | WEIGHT: 143 LBS | BODY MASS INDEX: 26.31 KG/M2 | DIASTOLIC BLOOD PRESSURE: 59 MMHG | TEMPERATURE: 97.9 F | OXYGEN SATURATION: 95 % | HEIGHT: 62 IN

## 2024-03-06 DIAGNOSIS — R52 PAIN MANAGEMENT: ICD-10-CM

## 2024-03-06 DIAGNOSIS — M48.062 SPINAL STENOSIS OF LUMBAR REGION WITH NEUROGENIC CLAUDICATION: Chronic | ICD-10-CM

## 2024-03-06 DIAGNOSIS — M54.16 LUMBAR RADICULOPATHY: Primary | ICD-10-CM

## 2024-03-06 LAB — GLUCOSE BLD-MCNC: 105 MG/DL (ref 65–105)

## 2024-03-06 PROCEDURE — 6360000004 HC RX CONTRAST MEDICATION: Performed by: ANESTHESIOLOGY

## 2024-03-06 PROCEDURE — 6360000002 HC RX W HCPCS: Performed by: ANESTHESIOLOGY

## 2024-03-06 PROCEDURE — 2500000003 HC RX 250 WO HCPCS: Performed by: ANESTHESIOLOGY

## 2024-03-06 PROCEDURE — 62323 NJX INTERLAMINAR LMBR/SAC: CPT | Performed by: ANESTHESIOLOGY

## 2024-03-06 PROCEDURE — 2580000003 HC RX 258: Performed by: ANESTHESIOLOGY

## 2024-03-06 PROCEDURE — 82947 ASSAY GLUCOSE BLOOD QUANT: CPT

## 2024-03-06 PROCEDURE — 62323 NJX INTERLAMINAR LMBR/SAC: CPT

## 2024-03-06 RX ORDER — MIDAZOLAM HYDROCHLORIDE 2 MG/2ML
INJECTION, SOLUTION INTRAMUSCULAR; INTRAVENOUS
Status: COMPLETED | OUTPATIENT
Start: 2024-03-06 | End: 2024-03-06

## 2024-03-06 RX ORDER — DEXAMETHASONE SODIUM PHOSPHATE 10 MG/ML
INJECTION, SOLUTION INTRAMUSCULAR; INTRAVENOUS
Status: COMPLETED | OUTPATIENT
Start: 2024-03-06 | End: 2024-03-06

## 2024-03-06 RX ORDER — FENTANYL CITRATE 50 UG/ML
INJECTION, SOLUTION INTRAMUSCULAR; INTRAVENOUS
Status: COMPLETED | OUTPATIENT
Start: 2024-03-06 | End: 2024-03-06

## 2024-03-06 RX ORDER — SODIUM CHLORIDE 0.9 % (FLUSH) 0.9 %
SYRINGE (ML) INJECTION
Status: COMPLETED | OUTPATIENT
Start: 2024-03-06 | End: 2024-03-06

## 2024-03-06 RX ORDER — LIDOCAINE HYDROCHLORIDE 10 MG/ML
INJECTION, SOLUTION EPIDURAL; INFILTRATION; INTRACAUDAL; PERINEURAL
Status: COMPLETED | OUTPATIENT
Start: 2024-03-06 | End: 2024-03-06

## 2024-03-06 RX ADMIN — DEXAMETHASONE SODIUM PHOSPHATE 10 MG: 10 INJECTION, SOLUTION INTRAMUSCULAR; INTRAVENOUS at 12:11

## 2024-03-06 RX ADMIN — Medication 5 ML: at 12:11

## 2024-03-06 RX ADMIN — IOHEXOL 3 ML: 180 INJECTION INTRAVENOUS at 12:11

## 2024-03-06 RX ADMIN — MIDAZOLAM HYDROCHLORIDE 1 MG: 1 INJECTION, SOLUTION INTRAMUSCULAR; INTRAVENOUS at 12:07

## 2024-03-06 RX ADMIN — FENTANYL CITRATE 50 MCG: 50 INJECTION, SOLUTION INTRAMUSCULAR; INTRAVENOUS at 12:06

## 2024-03-06 RX ADMIN — LIDOCAINE HYDROCHLORIDE 5 ML: 10 INJECTION, SOLUTION EPIDURAL; INFILTRATION; INTRACAUDAL at 12:07

## 2024-03-06 ASSESSMENT — PAIN DESCRIPTION - DESCRIPTORS: DESCRIPTORS: TINGLING;ACHING

## 2024-03-06 ASSESSMENT — PAIN - FUNCTIONAL ASSESSMENT
PAIN_FUNCTIONAL_ASSESSMENT: 0-10
PAIN_FUNCTIONAL_ASSESSMENT: PREVENTS OR INTERFERES WITH ALL ACTIVE AND SOME PASSIVE ACTIVITIES

## 2024-03-06 ASSESSMENT — PAIN SCALES - GENERAL
PAINLEVEL_OUTOF10: 0

## 2024-03-06 NOTE — DISCHARGE INSTRUCTIONS

## 2024-03-06 NOTE — OP NOTE
Patient Name: Bibi Marrero   YOB: 1940  Room/Bed: Room/bed info not found  Medical Record Number: [id number]  Date: 3/6/2024       Sedation/ Anesthesia Plan:   intravenous sedation   as needed.    Medications Planned:   midazolam (Versed) / Fentanyl  Intravenously  as needed.    Preoperative Diagnosis:    1. Lumbar radiculopathy    2. Spinal stenosis of lumbar region with neurogenic claudication        Postoperative Diagnosis:    1. Lumbar radiculopathy    2. Spinal stenosis of lumbar region with neurogenic claudication        Procedure Performed:  Lumbar epidural steroid injection under fluoroscopy guidance    Blood Loss: None    Procedure:      The Patient was seen in the preop area, chart was reviewed, informed consent was obtained. Patient was taken to procedure room and was placed in prone position. Vital signs were monitored through out the  Procedure. A time out was completed.  The skin over the back was prepped and draped in sterile manner.     The target point was marked at The interlaminar space at L4/5 . Skin and deep tissues were anesthetized with 1 % lidocaine.A 20-gauge Tuohy epidural needlele was advanced  under fluoroscopy guidance in AP view. Epidural space was identified using INDIO technique. Position ws confirmed in Lateral view.   Then after negative aspiration contrast dye Omnipaque-180 was injected with live fluoroscopy in AP views that showed  spread of the contrast in the epidural space  and no vascular runoff or intrathecal spread. Finally 5 ml of treatment solution containing 4 ml of PF NS and 1 ml of DEXAMETHASONE 10 mg / ml was injected.  The needle was removed and a Band-Aid was placed over the needle  insertion site.  The patient's vital signs remained stable and the patient tolerated the procedure well.      Electronically signed by Stewart Jean MD on 3/6/2024 at 12:13 PM

## 2024-03-06 NOTE — INTERVAL H&P NOTE
Update History & Physical    The patient's History and Physical of February 8, 2024 was reviewed with the patient and I examined the patient. There was no change. The surgical site was confirmed by the patient and me.     Plan: The risks, benefits, expected outcome, and alternative to the recommended procedure have been discussed with the patient. Patient understands and wants to proceed with the procedure.     Asa 3  Mp 3    Electronically signed by Stewart Jean MD on 3/6/2024 at 11:43 AM

## 2024-04-04 ENCOUNTER — HOSPITAL ENCOUNTER (OUTPATIENT)
Dept: PAIN MANAGEMENT | Age: 84
Discharge: HOME | End: 2024-04-04
Payer: MEDICARE

## 2024-04-04 VITALS — HEIGHT: 62 IN | BODY MASS INDEX: 26.31 KG/M2 | WEIGHT: 143 LBS

## 2024-04-04 DIAGNOSIS — M54.16 LUMBAR RADICULOPATHY: ICD-10-CM

## 2024-04-04 DIAGNOSIS — M47.812 CERVICAL SPONDYLOSIS: ICD-10-CM

## 2024-04-04 DIAGNOSIS — M47.817 LUMBOSACRAL SPONDYLOSIS WITHOUT MYELOPATHY: Primary | ICD-10-CM

## 2024-04-04 DIAGNOSIS — M48.062 SPINAL STENOSIS OF LUMBAR REGION WITH NEUROGENIC CLAUDICATION: Chronic | ICD-10-CM

## 2024-04-04 PROCEDURE — 99213 OFFICE O/P EST LOW 20 MIN: CPT

## 2024-04-04 PROCEDURE — 99214 OFFICE O/P EST MOD 30 MIN: CPT | Performed by: ANESTHESIOLOGY

## 2024-04-04 ASSESSMENT — PAIN SCALES - GENERAL: PAINLEVEL_OUTOF10: 3

## 2024-04-04 ASSESSMENT — ENCOUNTER SYMPTOMS
BACK PAIN: 1
RESPIRATORY NEGATIVE: 1
EYES NEGATIVE: 1

## 2024-04-04 NOTE — PROGRESS NOTES
The patient is a 83 y.o.Non- / non  female.    Chief Complaint   Patient presents with    Back Pain        HPI    83-year-old female with history of chronic neck and low back pain  Back pain is the major pain issue  Going on for many years  Axial back pain is constant describes it as aching nagging stiffness aggravates with routine activity  She has done multiple courses of physical therapy  Imaging at shown multilevel lumbar facet arthropathy  Patient had lumbar RFA at L4-5 and L5-S1 facets more than 6 months back which provided her more than 50% improvement in axial back pain with improved range of motion and activity tolerance  Relief lasted for 6 months  Pain is now back to the baseline  Discussed repeat lumbar RFA  Patient is interested in and will schedule for it  Oswestry disability show moderate disability associated with pain    She also had intermittent pain in the left lower extremity that shoots down from the lumbar area  Aggravates with standing and walking  She had recent MRI lumbar spine  Report was reviewed  She had lateral recess narrowing on the left L4-L5  Patient has tried physical therapy NSAIDs medications  She had epidural injection with limited benefit  Discussed Vertiflex procedure for moderate spinal stenosis at L4-L5  Information material provided      No results found for: \"LABA1C\"      Past Medical History:   Diagnosis Date    Arthritis     Diabetes mellitus (HCC)     High cholesterol     Migraines     Vision abnormalities         Past Surgical History:   Procedure Laterality Date    APPENDECTOMY  1949    CHOLECYSTECTOMY  1971    COLONOSCOPY  10/12/07    HYSTERECTOMY (CERVIX STATUS UNKNOWN)  1983    Ovaries removed    ORIF HUMERUS DECOMPRESSION Left 2010    PAIN MANAGEMENT PROCEDURE      SALPINGO-OOPHORECTOMY  1983    TONSILLECTOMY AND ADENOIDECTOMY  1962       Social History     Socioeconomic History    Marital status:      Spouse name: None    Number of children:

## 2024-04-05 ENCOUNTER — OFFICE VISIT (OUTPATIENT)
Dept: NEUROLOGY | Age: 84
End: 2024-04-05
Payer: MEDICARE

## 2024-04-05 VITALS
SYSTOLIC BLOOD PRESSURE: 146 MMHG | WEIGHT: 147 LBS | BODY MASS INDEX: 27.05 KG/M2 | DIASTOLIC BLOOD PRESSURE: 57 MMHG | HEART RATE: 58 BPM | HEIGHT: 62 IN

## 2024-04-05 DIAGNOSIS — G62.9 NEUROPATHY: ICD-10-CM

## 2024-04-05 DIAGNOSIS — M54.81 OCCIPITAL NEURALGIA OF RIGHT SIDE: Primary | ICD-10-CM

## 2024-04-05 PROCEDURE — 99214 OFFICE O/P EST MOD 30 MIN: CPT | Performed by: STUDENT IN AN ORGANIZED HEALTH CARE EDUCATION/TRAINING PROGRAM

## 2024-04-05 PROCEDURE — 1123F ACP DISCUSS/DSCN MKR DOCD: CPT | Performed by: STUDENT IN AN ORGANIZED HEALTH CARE EDUCATION/TRAINING PROGRAM

## 2024-04-05 RX ORDER — GABAPENTIN 300 MG/1
300 CAPSULE ORAL 3 TIMES DAILY
Qty: 270 CAPSULE | Refills: 4 | Status: SHIPPED | OUTPATIENT
Start: 2024-04-05 | End: 2025-04-04

## 2024-04-05 NOTE — PROGRESS NOTES
[facility] NEUROLOGY SPECIALIST  [address] [facility] [address]  [facility] [address]  Dept: [phone]    PATIENT NAME: Bibi Marrero  PATIENT MRN: [id number]  PRIMARY CARE PHYSICIAN: Wilbert Palafox DO    HPI:      Bibi Marrero is a 83 y.o. female who presents to clinic today for evaluation of Headache     She had an ONB in 2/2024 and notes it was effective. She notes it has resolved the tenderness and burning pain in her head. She rates the pain 2/10 in severity. She is still taking gabapentin 300 mg TID. She notes the gabapentin is controlling her neuropathy well. She notes she has mild burning that starts if she starts using the computer and resolves after she uses the computer.         Prior history:  She was referred by Dr. Mckenzie for an ONB.  Patient notes she has had a long history of headaches that her radiate from the occipital to right frontal area starting at the end 2021.  Patient describes it as a burning pain that is constant. Patient notes it is very tender to the touch.  Patient had an MRI cervical spine in 2022 which showed multilevel degenerative changes with bilateral neural foraminal narrowing at multiple levels.    She got an ONB by Dr. Jean which she reports was not effective. She has been seeing another neurologist. Her gabapentin was increased to 300 mg TID for neuropathy and neuralgia but it has not helped the occipital pain. She was taken off of it so she could take nortriptyline but her neuropathy got worse.  She was prescibred nortriptline 10 mg and fioricet for the headache but it was not effective.       Headaches  She notes she has had headaches since age 48 but the ociipital pain that raites to frontal area started in 2021.   The pain is predominantly located in right temporal area region and radiates to the frontal area.   Usually the headache is not preceded by an aura  Currently, headaches are occuring every day.  Patient describes the quality of pain as burning pain and head is

## 2024-04-22 ENCOUNTER — HOSPITAL ENCOUNTER (OUTPATIENT)
Dept: PAIN MANAGEMENT | Facility: CLINIC | Age: 84
Discharge: HOME | End: 2024-04-22
Payer: MEDICARE

## 2024-04-22 VITALS
SYSTOLIC BLOOD PRESSURE: 157 MMHG | TEMPERATURE: 97.2 F | RESPIRATION RATE: 12 BRPM | WEIGHT: 142 LBS | HEIGHT: 62 IN | HEART RATE: 52 BPM | DIASTOLIC BLOOD PRESSURE: 69 MMHG | BODY MASS INDEX: 26.13 KG/M2 | OXYGEN SATURATION: 98 %

## 2024-04-22 DIAGNOSIS — M47.817 LUMBOSACRAL SPONDYLOSIS WITHOUT MYELOPATHY: Primary | ICD-10-CM

## 2024-04-22 DIAGNOSIS — R52 PAIN MANAGEMENT: ICD-10-CM

## 2024-04-22 LAB — GLUCOSE BLD-MCNC: 108 MG/DL (ref 65–105)

## 2024-04-22 PROCEDURE — 82947 ASSAY GLUCOSE BLOOD QUANT: CPT

## 2024-04-22 PROCEDURE — 64635 DESTROY LUMB/SAC FACET JNT: CPT | Performed by: ANESTHESIOLOGY

## 2024-04-22 PROCEDURE — 64636 DESTROY L/S FACET JNT ADDL: CPT

## 2024-04-22 PROCEDURE — 64635 DESTROY LUMB/SAC FACET JNT: CPT

## 2024-04-22 PROCEDURE — 6360000002 HC RX W HCPCS: Performed by: ANESTHESIOLOGY

## 2024-04-22 PROCEDURE — 2500000003 HC RX 250 WO HCPCS: Performed by: ANESTHESIOLOGY

## 2024-04-22 PROCEDURE — 64636 DESTROY L/S FACET JNT ADDL: CPT | Performed by: ANESTHESIOLOGY

## 2024-04-22 PROCEDURE — 99152 MOD SED SAME PHYS/QHP 5/>YRS: CPT | Performed by: ANESTHESIOLOGY

## 2024-04-22 RX ORDER — FENTANYL CITRATE 50 UG/ML
INJECTION, SOLUTION INTRAMUSCULAR; INTRAVENOUS
Status: COMPLETED | OUTPATIENT
Start: 2024-04-22 | End: 2024-04-22

## 2024-04-22 RX ORDER — LIDOCAINE HYDROCHLORIDE 40 MG/ML
INJECTION, SOLUTION RETROBULBAR
Status: COMPLETED | OUTPATIENT
Start: 2024-04-22 | End: 2024-04-22

## 2024-04-22 RX ORDER — LIDOCAINE HYDROCHLORIDE 10 MG/ML
INJECTION, SOLUTION EPIDURAL; INFILTRATION; INTRACAUDAL; PERINEURAL
Status: COMPLETED | OUTPATIENT
Start: 2024-04-22 | End: 2024-04-22

## 2024-04-22 RX ORDER — MIDAZOLAM HYDROCHLORIDE 2 MG/2ML
INJECTION, SOLUTION INTRAMUSCULAR; INTRAVENOUS
Status: COMPLETED | OUTPATIENT
Start: 2024-04-22 | End: 2024-04-22

## 2024-04-22 RX ADMIN — LIDOCAINE HYDROCHLORIDE 5 ML: 40 SOLUTION RETROBULBAR; TOPICAL at 10:33

## 2024-04-22 RX ADMIN — LIDOCAINE HYDROCHLORIDE 9 ML: 10 INJECTION, SOLUTION EPIDURAL; INFILTRATION; INTRACAUDAL at 10:31

## 2024-04-22 RX ADMIN — FENTANYL CITRATE 50 MCG: 50 INJECTION, SOLUTION INTRAMUSCULAR; INTRAVENOUS at 10:29

## 2024-04-22 RX ADMIN — MIDAZOLAM HYDROCHLORIDE 1 MG: 1 INJECTION, SOLUTION INTRAMUSCULAR; INTRAVENOUS at 10:29

## 2024-04-22 ASSESSMENT — PAIN DESCRIPTION - DESCRIPTORS: DESCRIPTORS: ACHING;THROBBING

## 2024-04-22 ASSESSMENT — PAIN SCALES - GENERAL: PAINLEVEL_OUTOF10: 0

## 2024-04-22 NOTE — DISCHARGE INSTRUCTIONS
Discharge Instructions following Sedation or Anesthesia:  You have  received  a sedative/anesthetic therefore, you should not consume any alcoholic beverages for minimum of 12 hours.  Do not drive or operate machinery for 24 hours.  Do not sign legal documents for 24 hours.  Dizziness, drowsiness, and unsteadiness may occur.  Rest when need to.  Increase diet as tolerated.  Keep up on fluids if diet allows.      General Instructions:  Do not take a tub bath for 72 hours after procedure (this includes hot tubs and swimming pools).  You may shower, but avoid hot water to injection site.   Avoid strenuous activity TODAY especially if you experience dizziness.   Remove band-aid the next day.  Wash off any residual iodine   Do not use heat, heating pad, or any other heating device over the injection site for 3 days after the procedure.  If you experience pain after your procedure, you may continue with your current pain medication as prescribed.  (DO NOT INCREASE YOUR PAIN MEDICATION WITHOUT TALKING TO DOCTOR)  Soreness and pain at injection site is common, may use ice to reduce soreness.    Call [facility] at [phone] if you experience:   Fever, chills or temperature over 100    Vomiting, Headache, persistent stiff neck, nausea, blurred vision   Difficulty in urinating or unable to urinate with 8 hours   Increase in weakness, numbness or loss of function   Increased redness, swelling or drainage at the injection site

## 2024-04-22 NOTE — INTERVAL H&P NOTE
Update History & Physical    The patient's History and Physical of April 4, 2024 was reviewed with the patient and I examined the patient. There was no change. The surgical site was confirmed by the patient and me.     Plan: The risks, benefits, expected outcome, and alternative to the recommended procedure have been discussed with the patient. Patient understands and wants to proceed with the procedure.     ASA 2  MP 2    Electronically signed by Stewart Jean MD on 4/22/2024 at 9:41 AM

## 2024-04-22 NOTE — OP NOTE
Preoperative Diagnosis: Lumbar spondylosis w/o myelopathy, chronic low back pain  Postoperative Diagnosis: Lumbar spondylosis w/o myelopathy, chronic low back pain  SEDATION: SEE SEDATION NOTE  BLOOD LOSS: NONE    Procedure Performed:  :Radiofrequency ablation of median branches at the Transverse processes of L3, L4 and L5 for  on L3/4 and L4/5 facets left sided under fluoroscopic guidance with IV sedation    Procedure:    After starting an IV, the patient was taken to procedure room.  The patient was placed in prone position and skin over the back was prepped and draped in sterile manner.    Standard monitors were connected and vitals were monitored during the case and they remained stable during the procedure.    A meaningful communication was kept up with the patient throughout the procedure.    Then using fluoroscopy the junction of the transverse process of the target vertebra with the superior process of the facet joint was observed and the view was optimized.  The skin and deep tissues were infiltrated with 2 ml of  1 % lidocaine. The RF canula with the 10 mm active tip was introduced through the skin wheal under fluoroscopy guidance such that the tip of the needle lies in the groove of the transverse process with the superior processes of the facet joint.  Then a lateral and AP view of the lumbar spine was obtained to make sure the tip of needle is not in the neural foramen.  Then electric impedence was checked to make sure it is acceptable. Then a sensory stimulus was applied at 50 Hz up to 0.5 volt and concordant pain symptoms were reproduced. Then a motor stimulus was applied at 2 Hz up to 2 volts or 3 x times the sensory stimulus and no motor stimulation was seen in lower extremities.  Some multifidus stimulus was seen.  Then after negative aspiration 1 ml of 4% lidocaine was injected through the needle at each level.The radiofrequency lesion was done at 85 degrees centigrade for 110 seconds.

## 2024-05-23 ENCOUNTER — HOSPITAL ENCOUNTER (OUTPATIENT)
Dept: PAIN MANAGEMENT | Age: 84
Discharge: HOME | End: 2024-05-23
Payer: MEDICARE

## 2024-05-23 DIAGNOSIS — M47.812 CERVICAL SPONDYLOSIS: ICD-10-CM

## 2024-05-23 DIAGNOSIS — M47.817 LUMBOSACRAL SPONDYLOSIS WITHOUT MYELOPATHY: Primary | ICD-10-CM

## 2024-05-23 DIAGNOSIS — M48.062 SPINAL STENOSIS OF LUMBAR REGION WITH NEUROGENIC CLAUDICATION: Chronic | ICD-10-CM

## 2024-05-23 DIAGNOSIS — M54.16 LUMBAR RADICULOPATHY: ICD-10-CM

## 2024-05-23 PROCEDURE — 99213 OFFICE O/P EST LOW 20 MIN: CPT

## 2024-05-23 ASSESSMENT — ENCOUNTER SYMPTOMS
SINUS PAIN: 0
BACK PAIN: 1
CHOKING: 0
COUGH: 0
ABDOMINAL PAIN: 0
SHORTNESS OF BREATH: 0

## 2024-05-23 ASSESSMENT — PAIN DESCRIPTION - LOCATION: LOCATION: BACK

## 2024-05-23 ASSESSMENT — PAIN SCALES - GENERAL: PAINLEVEL_OUTOF10: 6

## 2024-05-23 NOTE — PROGRESS NOTES
The patient is a 84 y.o.Non- / non  female.    Chief Complaint   Patient presents with    Back Pain    Follow Up After Procedure     Radiofrequency ablation of median branches at the Transverse processes of L3, L4 and L5 for  on L3/4 and L4/5 facets left sided under fluoroscopic guidance with IV sedation        HPI        Dx:  S/P: Radiofrequency ablation of median branches at the Transverse processes of L3, L4 and L5 for  on L3/4 and L4/5 facets left sided under fluoroscopic guidance with IV sedation       Outcome   Any improvement of activity?  No   Any side effects (appetite,leg cramping,facial fleshing): None   Increase of pain:  No  Pain score Today:  6  % of pain relief: Leg pain is relieved but not back   Pain diary (medial branch block): No    No results found for: \"LABA1C\"      Past Medical History:   Diagnosis Date    Arthritis     Diabetes mellitus (HCC)     High cholesterol     Migraines     Vision abnormalities         Past Surgical History:   Procedure Laterality Date    APPENDECTOMY  1949    CHOLECYSTECTOMY  1971    COLONOSCOPY  10/12/07    HYSTERECTOMY (CERVIX STATUS UNKNOWN)  1983    Ovaries removed    ORIF HUMERUS DECOMPRESSION Left 2010    PAIN MANAGEMENT PROCEDURE      SALPINGO-OOPHORECTOMY  1983    TONSILLECTOMY AND ADENOIDECTOMY  1962       Social History     Socioeconomic History    Marital status:      Spouse name: None    Number of children: None    Years of education: None    Highest education level: None   Tobacco Use    Smoking status: Never     Passive exposure: Never    Smokeless tobacco: Never   Vaping Use    Vaping Use: Never used   Substance and Sexual Activity    Alcohol use: No     Alcohol/week: 0.0 standard drinks of alcohol    Drug use: No    Sexual activity: Yes     Partners: Male     Birth control/protection: Surgical, Post-menopausal     Comment: hysterectomy BSO       Family History   Problem Relation Age of Onset    Breast Cancer Paternal Cousin

## 2024-06-25 ENCOUNTER — OFFICE VISIT (OUTPATIENT)
Dept: NEUROLOGY | Age: 84
End: 2024-06-25
Payer: MEDICARE

## 2024-06-25 VITALS
SYSTOLIC BLOOD PRESSURE: 132 MMHG | DIASTOLIC BLOOD PRESSURE: 60 MMHG | WEIGHT: 143.6 LBS | HEART RATE: 56 BPM | BODY MASS INDEX: 26.43 KG/M2 | HEIGHT: 62 IN

## 2024-06-25 DIAGNOSIS — M54.81 OCCIPITAL NEURALGIA OF RIGHT SIDE: Primary | ICD-10-CM

## 2024-06-25 DIAGNOSIS — G62.9 NEUROPATHY: ICD-10-CM

## 2024-06-25 PROCEDURE — 99214 OFFICE O/P EST MOD 30 MIN: CPT | Performed by: STUDENT IN AN ORGANIZED HEALTH CARE EDUCATION/TRAINING PROGRAM

## 2024-06-25 PROCEDURE — G2211 COMPLEX E/M VISIT ADD ON: HCPCS | Performed by: STUDENT IN AN ORGANIZED HEALTH CARE EDUCATION/TRAINING PROGRAM

## 2024-06-25 PROCEDURE — 1123F ACP DISCUSS/DSCN MKR DOCD: CPT | Performed by: STUDENT IN AN ORGANIZED HEALTH CARE EDUCATION/TRAINING PROGRAM

## 2024-06-25 RX ORDER — GABAPENTIN 400 MG/1
400 CAPSULE ORAL 3 TIMES DAILY
Qty: 270 CAPSULE | Refills: 3 | Status: SHIPPED | OUTPATIENT
Start: 2024-06-25 | End: 2025-06-24

## 2024-06-25 NOTE — PROGRESS NOTES
Calcium]      Muscle pain    Macrobid [Nitrofurantoin Monohyd Macro] Hives    Sulfa Antibiotics         REVIEW OF SYSTEMS:     Review of Systems       NEUROLOGICAL EXAMINATION:   VITALS  /60 (Site: Left Upper Arm, Position: Sitting, Cuff Size: Medium Adult)   Pulse 56   Ht 1.575 m (5' 2\")   Wt 65.1 kg (143 lb 9.6 oz)   LMP  (LMP Unknown)   BMI 26.26 kg/m²      Mental status    Alert and oriented x 3; intact memory with no confusion, speech or language problems; no hallucinations or delusions     Cranial nerves    II - visual fields intact to confrontation  III, IV, VI - extra-ocular muscles full: no pupillary defect; no JORGE, no nystagmus, no ptosis   V - normal facial sensation                                                               VII - normal facial symmetry                                                             VIII - intact hearing                                                                             IX, X - symmetrical palate                                                                  XI - symmetrical shoulder shrug                                                       XII - tongue midline without atrophy or fasciculation      Motor function  Normal muscle bulk and tone; strength 5/5 on all 4 extremities, no pronator drift      Sensory function Intact to light touch, pinprick, vibration, proprioception on all 4 extremities      Cerebellar Intact fine motor movement. No involuntary movements or tremors. No ataxia or dysmetria on finger to nose or heel to shin testing      Reflex function DTR 2+ on bilateral UE and LE, symmetric.       Gait                   normal base and arm swing        ASSESSMENT / PLAN:   Bibi Marrero is a 84 y.o. female that established care with neurology for headaches. Patient had severe occipital neuralgia that responded well to an ONB. Plan to continue onb as needed for her pain control.     Plan:  Patient had an bilateral ONB in 2/2024 which was

## 2024-07-11 ENCOUNTER — HOSPITAL ENCOUNTER (OUTPATIENT)
Dept: PAIN MANAGEMENT | Age: 84
Discharge: HOME | End: 2024-07-11
Payer: MEDICARE

## 2024-07-11 VITALS — WEIGHT: 143 LBS | BODY MASS INDEX: 26.31 KG/M2 | HEIGHT: 62 IN

## 2024-07-11 DIAGNOSIS — M48.062 SPINAL STENOSIS OF LUMBAR REGION WITH NEUROGENIC CLAUDICATION: Chronic | ICD-10-CM

## 2024-07-11 DIAGNOSIS — M47.817 LUMBOSACRAL SPONDYLOSIS WITHOUT MYELOPATHY: Primary | ICD-10-CM

## 2024-07-11 DIAGNOSIS — M47.812 CERVICAL SPONDYLOSIS: ICD-10-CM

## 2024-07-11 DIAGNOSIS — M54.16 LUMBAR RADICULOPATHY: ICD-10-CM

## 2024-07-11 PROCEDURE — 99213 OFFICE O/P EST LOW 20 MIN: CPT

## 2024-07-11 PROCEDURE — 99214 OFFICE O/P EST MOD 30 MIN: CPT | Performed by: ANESTHESIOLOGY

## 2024-07-11 ASSESSMENT — ENCOUNTER SYMPTOMS
VOMITING: 0
RESPIRATORY NEGATIVE: 1
SHORTNESS OF BREATH: 0
GASTROINTESTINAL NEGATIVE: 1
BACK PAIN: 1
NAUSEA: 0
DIARRHEA: 0
CONSTIPATION: 0

## 2024-07-11 ASSESSMENT — PAIN SCALES - GENERAL: PAINLEVEL_OUTOF10: 7

## 2024-07-11 NOTE — PROGRESS NOTES
The patient is a 84 y.o.Non- / non  female.    Chief Complaint   Patient presents with    Back Pain        84-year-old female with history of chronic multisite body pain involving neck and low back  Leading of back pain on the left side with radiation down left leg that aggravates with standing and walking  Associated with intermittent numbness  No changes in bladder or bowel control  No focal weakness  Had recent MRI lumbar spine that showed lateral recess stenosis left-sided at L3-4 and L4-5  Patient has tried different modalities including therapy medications and injections  Pain is affecting quality of life and activity level  We discussed Vertiflex procedure at L3-4 and L4-5 interspinous spacers  Information material provided  She will need to arrange family member for postop care and will then call to schedule for the procedure      Pain level: 7  Character: burning, aching  Radiating: no  Weakness or numbness: no  Aggravating Factors: walking,standing  Alleviating Factors: sitting, laying down  Constant or intermitting: constant  Bladder/bowel loss: no    Past Medical History:   Diagnosis Date    Arthritis     Diabetes mellitus (HCC)     High cholesterol     Migraines     Vision abnormalities         Past Surgical History:   Procedure Laterality Date    APPENDECTOMY  1949    CHOLECYSTECTOMY  1971    COLONOSCOPY  10/12/07    HYSTERECTOMY (CERVIX STATUS UNKNOWN)  1983    Ovaries removed    ORIF HUMERUS DECOMPRESSION Left 2010    PAIN MANAGEMENT PROCEDURE      SALPINGO-OOPHORECTOMY  1983    TONSILLECTOMY AND ADENOIDECTOMY  1962       Social History     Socioeconomic History    Marital status:      Spouse name: None    Number of children: None    Years of education: None    Highest education level: None   Tobacco Use    Smoking status: Never     Passive exposure: Never    Smokeless tobacco: Never   Vaping Use    Vaping Use: Never used   Substance and Sexual Activity    Alcohol use: No

## 2024-09-30 RX ORDER — CARVEDILOL 25 MG/1
25 TABLET ORAL 2 TIMES DAILY
Qty: 180 TABLET | Refills: 1 | Status: SHIPPED | OUTPATIENT
Start: 2024-09-30

## 2024-10-07 ENCOUNTER — OFFICE VISIT (OUTPATIENT)
Age: 84
End: 2024-10-07
Payer: MEDICARE

## 2024-10-07 VITALS
DIASTOLIC BLOOD PRESSURE: 84 MMHG | OXYGEN SATURATION: 98 % | HEART RATE: 64 BPM | WEIGHT: 143.2 LBS | SYSTOLIC BLOOD PRESSURE: 128 MMHG | TEMPERATURE: 97.2 F | HEIGHT: 62 IN | BODY MASS INDEX: 26.35 KG/M2

## 2024-10-07 DIAGNOSIS — E55.9 VITAMIN D DEFICIENCY: ICD-10-CM

## 2024-10-07 DIAGNOSIS — I10 ESSENTIAL HYPERTENSION: ICD-10-CM

## 2024-10-07 DIAGNOSIS — M47.812 CERVICAL SPONDYLOSIS: ICD-10-CM

## 2024-10-07 DIAGNOSIS — E78.00 HIGH CHOLESTEROL: ICD-10-CM

## 2024-10-07 DIAGNOSIS — M35.3 POLYMYALGIA (HCC): ICD-10-CM

## 2024-10-07 DIAGNOSIS — G43.909 MIGRAINE WITHOUT STATUS MIGRAINOSUS, NOT INTRACTABLE, UNSPECIFIED MIGRAINE TYPE: ICD-10-CM

## 2024-10-07 DIAGNOSIS — M47.817 SPONDYLOSIS OF LUMBOSACRAL SPINE WITHOUT MYELOPATHY: ICD-10-CM

## 2024-10-07 DIAGNOSIS — G89.29 OTHER CHRONIC PAIN: ICD-10-CM

## 2024-10-07 DIAGNOSIS — M48.062 SPINAL STENOSIS OF LUMBAR REGION WITH NEUROGENIC CLAUDICATION: Primary | Chronic | ICD-10-CM

## 2024-10-07 DIAGNOSIS — Z78.0 ASYMPTOMATIC MENOPAUSAL STATE: ICD-10-CM

## 2024-10-07 DIAGNOSIS — M54.2 CERVICALGIA: ICD-10-CM

## 2024-10-07 DIAGNOSIS — N81.11 MIDLINE CYSTOCELE: ICD-10-CM

## 2024-10-07 DIAGNOSIS — N81.4 CYSTOCELE WITH PROLAPSE: ICD-10-CM

## 2024-10-07 DIAGNOSIS — M54.81 OCCIPITAL NEURALGIA OF RIGHT SIDE: ICD-10-CM

## 2024-10-07 DIAGNOSIS — M54.16 LUMBAR RADICULOPATHY: ICD-10-CM

## 2024-10-07 DIAGNOSIS — E03.9 ACQUIRED HYPOTHYROIDISM: ICD-10-CM

## 2024-10-07 DIAGNOSIS — M81.0 AGE-RELATED OSTEOPOROSIS WITHOUT CURRENT PATHOLOGICAL FRACTURE: ICD-10-CM

## 2024-10-07 DIAGNOSIS — H53.9 VISION ABNORMALITIES: ICD-10-CM

## 2024-10-07 DIAGNOSIS — R41.3 MEMORY CHANGES: ICD-10-CM

## 2024-10-07 DIAGNOSIS — E11.65 TYPE 2 DIABETES MELLITUS WITH HYPERGLYCEMIA, WITHOUT LONG-TERM CURRENT USE OF INSULIN (HCC): ICD-10-CM

## 2024-10-07 DIAGNOSIS — Z00.00 WELL ADULT EXAM: ICD-10-CM

## 2024-10-07 DIAGNOSIS — M47.817 LUMBOSACRAL SPONDYLOSIS WITHOUT MYELOPATHY: ICD-10-CM

## 2024-10-07 DIAGNOSIS — M19.90 ARTHRITIS: ICD-10-CM

## 2024-10-07 DIAGNOSIS — K21.9 GASTROESOPHAGEAL REFLUX DISEASE WITHOUT ESOPHAGITIS: ICD-10-CM

## 2024-10-07 DIAGNOSIS — Z12.31 ENCOUNTER FOR SCREENING MAMMOGRAM FOR MALIGNANT NEOPLASM OF BREAST: ICD-10-CM

## 2024-10-07 DIAGNOSIS — R20.2 PARESTHESIA OF SKIN: ICD-10-CM

## 2024-10-07 DIAGNOSIS — M17.12 PRIMARY OSTEOARTHRITIS OF LEFT KNEE: ICD-10-CM

## 2024-10-07 DIAGNOSIS — E11.69 TYPE 2 DIABETES MELLITUS WITH OTHER SPECIFIED COMPLICATION, WITHOUT LONG-TERM CURRENT USE OF INSULIN (HCC): ICD-10-CM

## 2024-10-07 PROBLEM — E11.9 TYPE 2 DIABETES MELLITUS WITHOUT COMPLICATION (HCC): Status: ACTIVE | Noted: 2024-07-18

## 2024-10-07 PROCEDURE — 99205 OFFICE O/P NEW HI 60 MIN: CPT | Performed by: FAMILY MEDICINE

## 2024-10-07 PROCEDURE — 1123F ACP DISCUSS/DSCN MKR DOCD: CPT | Performed by: FAMILY MEDICINE

## 2024-10-07 RX ORDER — GABAPENTIN 300 MG/1
300 CAPSULE ORAL 3 TIMES DAILY
Qty: 270 CAPSULE | Refills: 3
Start: 2024-10-07 | End: 2025-10-06

## 2024-10-07 RX ORDER — BLOOD SUGAR DIAGNOSTIC
STRIP MISCELLANEOUS
COMMUNITY
Start: 2024-08-20

## 2024-10-07 SDOH — ECONOMIC STABILITY: FOOD INSECURITY: WITHIN THE PAST 12 MONTHS, THE FOOD YOU BOUGHT JUST DIDN'T LAST AND YOU DIDN'T HAVE MONEY TO GET MORE.: NEVER TRUE

## 2024-10-07 SDOH — ECONOMIC STABILITY: FOOD INSECURITY: WITHIN THE PAST 12 MONTHS, YOU WORRIED THAT YOUR FOOD WOULD RUN OUT BEFORE YOU GOT MONEY TO BUY MORE.: NEVER TRUE

## 2024-10-07 SDOH — ECONOMIC STABILITY: INCOME INSECURITY: HOW HARD IS IT FOR YOU TO PAY FOR THE VERY BASICS LIKE FOOD, HOUSING, MEDICAL CARE, AND HEATING?: NOT HARD AT ALL

## 2024-10-07 ASSESSMENT — PATIENT HEALTH QUESTIONNAIRE - PHQ9
SUM OF ALL RESPONSES TO PHQ9 QUESTIONS 1 & 2: 0
SUM OF ALL RESPONSES TO PHQ QUESTIONS 1-9: 0
2. FEELING DOWN, DEPRESSED OR HOPELESS: NOT AT ALL
1. LITTLE INTEREST OR PLEASURE IN DOING THINGS: NOT AT ALL
SUM OF ALL RESPONSES TO PHQ QUESTIONS 1-9: 0

## 2024-10-07 NOTE — PROGRESS NOTES
[facility]  [facility] FAMILY MEDICINE  [address] [address]      Date of Visit:  10/7/2024  Patient Name: Bibi Marrero   Patient :  1940     Bibi Marrero is a 84 y.o. female who presents today for an general visit to be evaluated for the following condition(s):  Chief Complaint   Patient presents with    New Patient     Np coming from [facility].        SUBJECTIVE:  HPI   Pt is here ESTABLISHING CARE FOR [facility] SAW DR HERRERA AND SHE IS DIABETIC SHE HAS PROLAPSE AND SHE SEES DR MARTIN FOR HER PROLAPSE SHE JUST CHECKS HER SHE SAW HER IN  ,SHE FEELS OK OTHER WISE SHE ALSO HAS HX OF SPINAL STENOSIS AND DJD AND BULGING DISK SHE WAS SEEN AT THE PAIN CLINIC SHE HAD EPIDURAL AND AB ELATION AND NOT HELPING THEY ARE OFFERING HER A SPACER SHE STATES ,SHE TAKES GABAPENTIN 300 MG TID AND NOT 400MG  MG BOTHERED HERE ,SHE ALSO OCCIPITAL NEUROLGIA.  Current Outpatient Medications   Medication Sig Dispense Refill    ONETOUCH ULTRA strip USE AS DIRECTED TO TEST BLOOD SUGARS EVERY DAY      gabapentin (NEURONTIN) 300 MG capsule Take 1 capsule by mouth 3 times daily for 364 days. 270 capsule 3    carvedilol (COREG) 25 MG tablet TAKE 1 TABLET TWICE A  tablet 1    omeprazole (PRILOSEC) 20 MG delayed release capsule Take 1 capsule by mouth Daily 90 capsule 1    Multiple Vitamins-Minerals (EYE VITAMINS) CAPS Take by mouth      levothyroxine (SYNTHROID) 50 MCG tablet Take 1 tablet by mouth Daily      acetaminophen (TYLENOL) 500 MG tablet Take 1 tablet by mouth every 6 hours as needed for Pain      Multiple Vitamins-Minerals (ICAPS AREDS 2 PO) Take by mouth      benazepril (LOTENSIN) 10 MG tablet Take 1 tablet by mouth daily      traMADol (ULTRAM) 50 MG tablet Take 1 tablet by mouth every 6 hours as needed for Pain.      simvastatin (ZOCOR) 40 MG tablet Take 1 tablet by mouth nightly      celecoxib (CELEBREX) 200 MG capsule Take 1 capsule by mouth daily      metFORMIN (GLUCOPHAGE) 500 MG  - consult on diet  - outpatient f/u - 8/17 A1c 6.4  - Humalog Sliding scale

## 2024-10-21 ENCOUNTER — PROCEDURE VISIT (OUTPATIENT)
Dept: NEUROLOGY | Age: 84
End: 2024-10-21
Payer: MEDICARE

## 2024-10-21 VITALS
SYSTOLIC BLOOD PRESSURE: 144 MMHG | DIASTOLIC BLOOD PRESSURE: 74 MMHG | HEART RATE: 56 BPM | WEIGHT: 146.3 LBS | BODY MASS INDEX: 26.92 KG/M2 | HEIGHT: 62 IN

## 2024-10-21 DIAGNOSIS — G62.9 POLYNEUROPATHY: ICD-10-CM

## 2024-10-21 DIAGNOSIS — R51.9 CHRONIC DAILY HEADACHE: ICD-10-CM

## 2024-10-21 DIAGNOSIS — M54.81 BILATERAL OCCIPITAL NEURALGIA: Primary | ICD-10-CM

## 2024-10-21 PROCEDURE — 99214 OFFICE O/P EST MOD 30 MIN: CPT | Performed by: PSYCHIATRY & NEUROLOGY

## 2024-10-21 PROCEDURE — 1123F ACP DISCUSS/DSCN MKR DOCD: CPT | Performed by: PSYCHIATRY & NEUROLOGY

## 2024-10-21 PROCEDURE — 64405 NJX AA&/STRD GR OCPL NRV: CPT | Performed by: PSYCHIATRY & NEUROLOGY

## 2024-10-21 RX ORDER — METHYLPREDNISOLONE SODIUM SUCCINATE 40 MG/ML
40 INJECTION, POWDER, LYOPHILIZED, FOR SOLUTION INTRAMUSCULAR; INTRAVENOUS ONCE
Status: COMPLETED | OUTPATIENT
Start: 2024-10-21 | End: 2024-10-21

## 2024-10-21 RX ADMIN — METHYLPREDNISOLONE SODIUM SUCCINATE 40 MG: 40 INJECTION, POWDER, LYOPHILIZED, FOR SOLUTION INTRAMUSCULAR; INTRAVENOUS at 12:32

## 2024-10-21 NOTE — PROGRESS NOTES
Procedure Note     Procedure: Bilateral greater and Lesser Occipital Nerve Block (CPT code 16117, 75894)     Indications:  Severe bilateral occipital neuralgia (ICD 10 M54.81)     Informed consent was obtained (explaining the procedure and risks and benefits) from patient. The signed consent form was placed in the medical record.     A time out was completed, verifying correct patient, procedure,site, positioning, and implants or special equipment.     Patient's left occipital area was palpated to identify location of the greater and the lesser occipital nerve. Using a 10 ml syringe, 7 ml of 2% lidocaine (from a 20 ml bottle) and 1 ml (40 mg) of methylprednisolone was aspirated. Alcohol was applied topically to the skin. Using a 27 gauge needle (aspirating during insertion),  2 ml was injected on the greater and lesser occipital nerve on the left side. Pressure with a gauze pad was held briefly upon the site of puncture to minimize bleeding and to further spread anaesthetic subcutaneously. The procedure was repeated on the right side. There were no complications.  Patient was comfortable and left without complaint.     Empty vial of methylprednisolone was discarded.

## 2024-10-21 NOTE — PROGRESS NOTES
[facility] NEUROLOGY SPECIALIST  [address]  [facility] [address]  Dept: [phone]    PATIENT NAME: Bibi Marrero  PATIENT MRN: [id number]  PRIMARY CARE PHYSICIAN: Chava Suazo DO    HPI:        Bibi Marrero is a 84 y.o. female with a history of DM, HTN, chronic low back and neck pain with lumbar radiculopathy, polyneuropathy, previously followed by Dr. Kinza Landaverde presenting today to transfer care for headache.  Her history is summarized as follows:     Bibi Marrero began having headaches around the age of 48.  In 2021, she developed occipital tenderness and pain radiating to the frontal area.  This pain was primarily located in the right compared to the left.  She also has right temporal area pain.  The quality the pain is described as burning and varies in intensity from 5-10 out of 10.  Headaches were occurring daily in the past.  She has had occipital nerve blocks done in the past.  She specifically underwent occipital nerve block in 2/20/24, and states that this was effective.  Her pain was reduced to 3 out of 10 in severity for 2 weeks.  She was also taking gabapentin 300 mg 3 times daily.  She notes that burning pain can be triggered by using the computer.      TODAY'S EVALUTION:    Bibi Marrero  presents today to transfer care.  She reports that she had a resolution of left occipital pain and tenderness with radiation to the left retro-orbital area with prior bilateral ONB in February 2024.  A left ONB was not effective in the past.     She increased gabapentin to 400 mg TID, but could not tolerate this due to sedation and she decreased the dose back to 300 mg TID, which is helpful and well tolerated.     HEADACHE SUMMARY:  Headache location: Bilateral occipital (worse on the right), radiates to the frontal area and significant pain in the right temporal region  Headache quality: Burning  Intensity: 5-10 out of 10  Age of Onset: 48 for headache; occipital neuralgia began in

## 2024-10-28 ENCOUNTER — HOSPITAL ENCOUNTER (OUTPATIENT)
Age: 84
Setting detail: SPECIMEN
Discharge: HOME | End: 2024-10-28

## 2024-10-28 DIAGNOSIS — R41.3 MEMORY CHANGES: ICD-10-CM

## 2024-10-28 DIAGNOSIS — D50.9 IRON DEFICIENCY ANEMIA, UNSPECIFIED IRON DEFICIENCY ANEMIA TYPE: Primary | ICD-10-CM

## 2024-10-28 DIAGNOSIS — E03.9 ACQUIRED HYPOTHYROIDISM: ICD-10-CM

## 2024-10-28 DIAGNOSIS — I10 ESSENTIAL HYPERTENSION: ICD-10-CM

## 2024-10-28 DIAGNOSIS — E78.00 HIGH CHOLESTEROL: ICD-10-CM

## 2024-10-28 DIAGNOSIS — E11.69 TYPE 2 DIABETES MELLITUS WITH OTHER SPECIFIED COMPLICATION, WITHOUT LONG-TERM CURRENT USE OF INSULIN (HCC): ICD-10-CM

## 2024-10-28 DIAGNOSIS — H53.9 VISION ABNORMALITIES: ICD-10-CM

## 2024-10-28 DIAGNOSIS — Z12.31 ENCOUNTER FOR SCREENING MAMMOGRAM FOR MALIGNANT NEOPLASM OF BREAST: ICD-10-CM

## 2024-10-28 DIAGNOSIS — E55.9 VITAMIN D DEFICIENCY: ICD-10-CM

## 2024-10-28 LAB
25(OH)D3 SERPL-MCNC: 60 NG/ML (ref 30–100)
ALBUMIN SERPL-MCNC: 4.4 G/DL (ref 3.5–5.2)
ALBUMIN/GLOB SERPL: 2 {RATIO} (ref 1–2.5)
ALP SERPL-CCNC: 59 U/L (ref 35–104)
ALT SERPL-CCNC: 17 U/L (ref 10–35)
ANION GAP SERPL CALCULATED.3IONS-SCNC: 9 MMOL/L (ref 9–16)
AST SERPL-CCNC: 25 U/L (ref 10–35)
BACTERIA URNS QL MICRO: ABNORMAL
BASOPHILS # BLD: 0.09 K/UL (ref 0–0.2)
BASOPHILS NFR BLD: 2 % (ref 0–2)
BILIRUB DIRECT SERPL-MCNC: 0.2 MG/DL (ref 0–0.2)
BILIRUB INDIRECT SERPL-MCNC: 0.2 MG/DL (ref 0–1)
BILIRUB SERPL-MCNC: 0.4 MG/DL (ref 0–1.2)
BILIRUB UR QL STRIP: NEGATIVE
BUN SERPL-MCNC: 32 MG/DL (ref 8–23)
CALCIUM SERPL-MCNC: 9.4 MG/DL (ref 8.6–10.4)
CASTS #/AREA URNS LPF: ABNORMAL /LPF (ref 0–8)
CHLORIDE SERPL-SCNC: 107 MMOL/L (ref 98–107)
CHOLEST SERPL-MCNC: 195 MG/DL (ref 0–199)
CHOLESTEROL/HDL RATIO: 3
CLARITY UR: ABNORMAL
CO2 SERPL-SCNC: 25 MMOL/L (ref 20–31)
COLOR UR: ABNORMAL
CREAT SERPL-MCNC: 1 MG/DL (ref 0.5–0.9)
CREAT UR-MCNC: 102 MG/DL (ref 28–217)
EOSINOPHIL # BLD: 0.16 K/UL (ref 0–0.44)
EOSINOPHILS RELATIVE PERCENT: 3 % (ref 1–4)
EPI CELLS #/AREA URNS HPF: ABNORMAL /HPF (ref 0–5)
ERYTHROCYTE [DISTWIDTH] IN BLOOD BY AUTOMATED COUNT: 13.3 % (ref 11.8–14.4)
FOLATE SERPL-MCNC: >20 NG/ML (ref 4.8–24.2)
GFR, ESTIMATED: 53 ML/MIN/1.73M2
GLOBULIN SER CALC-MCNC: 2.3 G/DL
GLUCOSE SERPL-MCNC: 111 MG/DL (ref 74–99)
GLUCOSE UR STRIP-MCNC: NEGATIVE MG/DL
HCT VFR BLD AUTO: 33.9 % (ref 36.3–47.1)
HDLC SERPL-MCNC: 69 MG/DL
HGB BLD-MCNC: 10.5 G/DL (ref 11.9–15.1)
HGB UR QL STRIP.AUTO: NEGATIVE
IMM GRANULOCYTES # BLD AUTO: <0.03 K/UL (ref 0–0.3)
IMM GRANULOCYTES NFR BLD: 0 %
KETONES UR STRIP-MCNC: NEGATIVE MG/DL
LDLC SERPL CALC-MCNC: 109 MG/DL (ref 0–100)
LEUKOCYTE ESTERASE UR QL STRIP: ABNORMAL
LYMPHOCYTES NFR BLD: 0.96 K/UL (ref 1.1–3.7)
LYMPHOCYTES RELATIVE PERCENT: 16 % (ref 24–43)
MAGNESIUM SERPL-MCNC: 1.9 MG/DL (ref 1.6–2.4)
MCH RBC QN AUTO: 29.8 PG (ref 25.2–33.5)
MCHC RBC AUTO-ENTMCNC: 31 G/DL (ref 28.4–34.8)
MCV RBC AUTO: 96.3 FL (ref 82.6–102.9)
MICROALBUMIN UR-MCNC: 55 MG/L (ref 0–20)
MICROALBUMIN/CREAT UR-RTO: 54 MCG/MG CREAT (ref 0–25)
MONOCYTES NFR BLD: 0.6 K/UL (ref 0.1–1.2)
MONOCYTES NFR BLD: 10 % (ref 3–12)
NEUTROPHILS NFR BLD: 69 % (ref 36–65)
NEUTS SEG NFR BLD: 4.2 K/UL (ref 1.5–8.1)
NITRITE UR QL STRIP: POSITIVE
NRBC BLD-RTO: 0 PER 100 WBC
PH UR STRIP: 5.5 [PH] (ref 5–8)
PLATELET # BLD AUTO: 211 K/UL (ref 138–453)
PMV BLD AUTO: 11.1 FL (ref 8.1–13.5)
POTASSIUM SERPL-SCNC: 5.1 MMOL/L (ref 3.7–5.3)
PROT SERPL-MCNC: 6.7 G/DL (ref 6.6–8.7)
PROT UR STRIP-MCNC: ABNORMAL MG/DL
RBC # BLD AUTO: 3.52 M/UL (ref 3.95–5.11)
RBC #/AREA URNS HPF: ABNORMAL /HPF (ref 0–4)
SODIUM SERPL-SCNC: 141 MMOL/L (ref 136–145)
SP GR UR STRIP: 1.02 (ref 1–1.03)
TRIGL SERPL-MCNC: 85 MG/DL (ref 0–149)
TSH SERPL DL<=0.05 MIU/L-ACNC: 2.1 UIU/ML (ref 0.27–4.2)
URATE SERPL-MCNC: 4.3 MG/DL (ref 2.4–5.7)
UROBILINOGEN UR STRIP-ACNC: NORMAL EU/DL (ref 0–1)
VIT B12 SERPL-MCNC: 1056 PG/ML (ref 232–1245)
VLDLC SERPL CALC-MCNC: 17 MG/DL
WBC #/AREA URNS HPF: ABNORMAL /HPF (ref 0–5)
WBC OTHER # BLD: 6 K/UL (ref 3.5–11.3)

## 2024-10-29 RX ORDER — AMPICILLIN 500 MG/1
500 CAPSULE ORAL 4 TIMES DAILY
Qty: 28 CAPSULE | Refills: 0 | Status: SHIPPED | OUTPATIENT
Start: 2024-10-29 | End: 2024-11-05

## 2024-10-31 LAB
MICROORGANISM SPEC CULT: ABNORMAL
SPECIMEN DESCRIPTION: ABNORMAL

## 2024-10-31 RX ORDER — LEVOFLOXACIN 250 MG/1
250 TABLET, FILM COATED ORAL DAILY
Qty: 7 TABLET | Refills: 0 | Status: SHIPPED | OUTPATIENT
Start: 2024-10-31 | End: 2024-11-07

## 2024-11-07 ENCOUNTER — OFFICE VISIT (OUTPATIENT)
Age: 84
End: 2024-11-07

## 2024-11-07 VITALS
OXYGEN SATURATION: 96 % | HEART RATE: 60 BPM | SYSTOLIC BLOOD PRESSURE: 116 MMHG | WEIGHT: 145.8 LBS | HEIGHT: 62 IN | BODY MASS INDEX: 26.83 KG/M2 | DIASTOLIC BLOOD PRESSURE: 51 MMHG

## 2024-11-07 DIAGNOSIS — M54.2 CERVICALGIA: ICD-10-CM

## 2024-11-07 DIAGNOSIS — M54.16 LUMBAR RADICULOPATHY: ICD-10-CM

## 2024-11-07 DIAGNOSIS — G89.29 OTHER CHRONIC PAIN: ICD-10-CM

## 2024-11-07 DIAGNOSIS — D64.9 ANEMIA, UNSPECIFIED TYPE: ICD-10-CM

## 2024-11-07 DIAGNOSIS — N81.4 CYSTOCELE WITH PROLAPSE: ICD-10-CM

## 2024-11-07 DIAGNOSIS — E55.9 VITAMIN D DEFICIENCY: ICD-10-CM

## 2024-11-07 DIAGNOSIS — E78.00 HIGH CHOLESTEROL: ICD-10-CM

## 2024-11-07 DIAGNOSIS — M54.81 OCCIPITAL NEURALGIA OF RIGHT SIDE: ICD-10-CM

## 2024-11-07 DIAGNOSIS — M81.0 AGE-RELATED OSTEOPOROSIS WITHOUT CURRENT PATHOLOGICAL FRACTURE: ICD-10-CM

## 2024-11-07 DIAGNOSIS — Z78.0 ASYMPTOMATIC MENOPAUSAL STATE: ICD-10-CM

## 2024-11-07 DIAGNOSIS — M48.062 SPINAL STENOSIS OF LUMBAR REGION WITH NEUROGENIC CLAUDICATION: Primary | ICD-10-CM

## 2024-11-07 DIAGNOSIS — N81.11 MIDLINE CYSTOCELE: ICD-10-CM

## 2024-11-07 DIAGNOSIS — R41.3 MEMORY CHANGES: ICD-10-CM

## 2024-11-07 DIAGNOSIS — H53.9 VISION ABNORMALITIES: ICD-10-CM

## 2024-11-07 DIAGNOSIS — M47.817 LUMBOSACRAL SPONDYLOSIS WITHOUT MYELOPATHY: ICD-10-CM

## 2024-11-07 DIAGNOSIS — Z00.00 WELL ADULT EXAM: ICD-10-CM

## 2024-11-07 DIAGNOSIS — M47.812 CERVICAL SPONDYLOSIS: ICD-10-CM

## 2024-11-07 DIAGNOSIS — N30.00 ACUTE CYSTITIS WITHOUT HEMATURIA: ICD-10-CM

## 2024-11-07 DIAGNOSIS — M19.90 ARTHRITIS: ICD-10-CM

## 2024-11-07 DIAGNOSIS — E11.69 TYPE 2 DIABETES MELLITUS WITH OTHER SPECIFIED COMPLICATION, WITHOUT LONG-TERM CURRENT USE OF INSULIN (HCC): ICD-10-CM

## 2024-11-07 DIAGNOSIS — Z00.00 INITIAL MEDICARE ANNUAL WELLNESS VISIT: ICD-10-CM

## 2024-11-07 DIAGNOSIS — K21.9 GASTROESOPHAGEAL REFLUX DISEASE WITHOUT ESOPHAGITIS: ICD-10-CM

## 2024-11-07 DIAGNOSIS — E03.9 ACQUIRED HYPOTHYROIDISM: ICD-10-CM

## 2024-11-07 DIAGNOSIS — I10 ESSENTIAL HYPERTENSION: ICD-10-CM

## 2024-11-07 DIAGNOSIS — E11.65 TYPE 2 DIABETES MELLITUS WITH HYPERGLYCEMIA, WITHOUT LONG-TERM CURRENT USE OF INSULIN (HCC): ICD-10-CM

## 2024-11-07 DIAGNOSIS — G43.909 MIGRAINE WITHOUT STATUS MIGRAINOSUS, NOT INTRACTABLE, UNSPECIFIED MIGRAINE TYPE: ICD-10-CM

## 2024-11-07 LAB — HBA1C MFR BLD: 6.2 %

## 2024-11-07 ASSESSMENT — PATIENT HEALTH QUESTIONNAIRE - PHQ9
SUM OF ALL RESPONSES TO PHQ9 QUESTIONS 1 & 2: 0
SUM OF ALL RESPONSES TO PHQ QUESTIONS 1-9: 0
SUM OF ALL RESPONSES TO PHQ QUESTIONS 1-9: 0
2. FEELING DOWN, DEPRESSED OR HOPELESS: NOT AT ALL
SUM OF ALL RESPONSES TO PHQ QUESTIONS 1-9: 0
1. LITTLE INTEREST OR PLEASURE IN DOING THINGS: NOT AT ALL
SUM OF ALL RESPONSES TO PHQ QUESTIONS 1-9: 0

## 2024-11-07 ASSESSMENT — LIFESTYLE VARIABLES
HOW OFTEN DO YOU HAVE A DRINK CONTAINING ALCOHOL: NEVER
HOW MANY STANDARD DRINKS CONTAINING ALCOHOL DO YOU HAVE ON A TYPICAL DAY: PATIENT DOES NOT DRINK

## 2024-11-07 NOTE — PROGRESS NOTES
Calcium Carbonate-Vitamin D 600-400 MG-UNIT CHEW Take  by mouth 2 times daily.       No current facility-administered medications for this visit.      Allergies   Allergen Reactions    Aspirin      Intolerance. Upsets stomach    Duricef [Cefadroxil] Hives    Lipitor [Atorvastatin Calcium]      Muscle pain    Macrobid [Nitrofurantoin Monohyd Macro] Hives    Sulfa Antibiotics         Review of Systems  Constitutional: Negative. Negative for activity change, appetite change, chills, diaphoresis, fatigue, fever and unexpected weight changes   HENT:  Negative. Negative for congestion, dental problem, drooling, ear discharge, ear pain, facial swelling, hearing loss, mouth sores, nosebleeds, postnasal drip, rhinorrhea, sinus, pressure, sinus pain, sneezing, sore throat, tinnitus, trouble swallowing and voice change   Eyes:  Negative for photophobia, pain, discharge, redness, itching and visual disturbance.   Respiratory: Negative for apnea, cough, choking, chest tightness, shortness of breath, wheezing and stridor.   Cardiovascular: Negative for chest pain, palpitations and leg swelling   Gastrointestinal: Negative for abdominal distention, abdominal pain, anal bleeding, blood in stool, constipation, diarrhea, nausea, rectal pain and vomiting.   Genitourinary:   Negative for decreased urine volume, difficulty urinating, dysuria, enuresis, flank pain, frequency, genital sores, hematuria and urgency.   Musculoskeletal:   Negative for arthralgias, back pain, gait problem, joint swelling, myalgias, neck pain and neck stiffness   Skin:  Negative for color change, pallor, rash and wound.   Neurological:   Negative for tremors, seizures, syncope, facial asymmetry, speech difficulty, weakness, light-headedness, numbness and headaches.   Psychiatric/Behavior: Negative for agitation, behavioral problems, confusion, decreased concentration, dysphoric mood, hallucinations, self-injury, sleep disturbance and suicidal ideas.  The

## 2024-11-07 NOTE — PATIENT INSTRUCTIONS
Learning About Being Active as an Older Adult  Why is being active important as you get older?     Being active is one of the best things you can do for your health. And it's never too late to start. Being active--or getting active, if you aren't already--has definite benefits. It can:  Give you more energy,  Keep your mind sharp.  Improve balance to reduce your risk of falls.  Help you manage chronic illness with fewer medicines.  No matter how old you are, how fit you are, or what health problems you have, there is a form of activity that will work for you. And the more physical activity you can do, the better your overall health will be.  What kinds of activity can help you stay healthy?  Being more active will make your daily activities easier. Physical activity includes planned exercise and things you do in daily life. There are four types of activity:  Aerobic.  Doing aerobic activity makes your heart and lungs strong.  Includes walking, dancing, and gardening.  Aim for at least 2½ hours spread throughout the week.  It improves your energy and can help you sleep better.  Muscle-strengthening.  This type of activity can help maintain muscle and strengthen bones.  Includes climbing stairs, using resistance bands, and lifting or carrying heavy loads.  Aim for at least twice a week.  It can help protect the knees and other joints.  Stretching.  Stretching gives you better range of motion in joints and muscles.  Includes upper arm stretches, calf stretches, and gentle yoga.  Aim for at least twice a week, preferably after your muscles are warmed up from other activities.  It can help you function better in daily life.  Balancing.  This helps you stay coordinated and have good posture.  Includes heel-to-toe walking, ange chi, and certain types of yoga.  Aim for at least 3 days a week.  It can reduce your risk of falling.  Even if you have a hard time meeting the recommendations, it's better to be more active

## 2024-11-08 DIAGNOSIS — E11.69 TYPE 2 DIABETES MELLITUS WITH OTHER SPECIFIED COMPLICATION, WITHOUT LONG-TERM CURRENT USE OF INSULIN (HCC): Primary | ICD-10-CM

## 2024-11-08 PROCEDURE — 83036 HEMOGLOBIN GLYCOSYLATED A1C: CPT | Performed by: FAMILY MEDICINE

## 2024-11-14 ENCOUNTER — HOSPITAL ENCOUNTER (OUTPATIENT)
Age: 84
Setting detail: SPECIMEN
Discharge: HOME | End: 2024-11-14

## 2024-11-14 DIAGNOSIS — D64.9 ANEMIA, UNSPECIFIED TYPE: ICD-10-CM

## 2024-11-14 DIAGNOSIS — Z00.00 WELL ADULT EXAM: ICD-10-CM

## 2024-11-14 DIAGNOSIS — E11.65 TYPE 2 DIABETES MELLITUS WITH HYPERGLYCEMIA, WITHOUT LONG-TERM CURRENT USE OF INSULIN (HCC): ICD-10-CM

## 2024-11-14 LAB
BASOPHILS # BLD: 0.09 K/UL (ref 0–0.2)
BASOPHILS NFR BLD: 1 % (ref 0–2)
EOSINOPHIL # BLD: 0.13 K/UL (ref 0–0.44)
EOSINOPHILS RELATIVE PERCENT: 2 % (ref 1–4)
ERYTHROCYTE [DISTWIDTH] IN BLOOD BY AUTOMATED COUNT: 13.5 % (ref 11.8–14.4)
FERRITIN SERPL-MCNC: 28 NG/ML
HCT VFR BLD AUTO: 33.7 % (ref 36.3–47.1)
HGB BLD-MCNC: 10.4 G/DL (ref 11.9–15.1)
IMM GRANULOCYTES # BLD AUTO: <0.03 K/UL (ref 0–0.3)
IMM GRANULOCYTES NFR BLD: 0 %
IRON SATN MFR SERPL: 22 % (ref 20–55)
IRON SERPL-MCNC: 72 UG/DL (ref 37–145)
LYMPHOCYTES NFR BLD: 1.18 K/UL (ref 1.1–3.7)
LYMPHOCYTES RELATIVE PERCENT: 18 % (ref 24–43)
MCH RBC QN AUTO: 29.5 PG (ref 25.2–33.5)
MCHC RBC AUTO-ENTMCNC: 30.9 G/DL (ref 28.4–34.8)
MCV RBC AUTO: 95.7 FL (ref 82.6–102.9)
MONOCYTES NFR BLD: 0.68 K/UL (ref 0.1–1.2)
MONOCYTES NFR BLD: 11 % (ref 3–12)
NEUTROPHILS NFR BLD: 68 % (ref 36–65)
NEUTS SEG NFR BLD: 4.3 K/UL (ref 1.5–8.1)
NRBC BLD-RTO: 0 PER 100 WBC
PLATELET # BLD AUTO: 225 K/UL (ref 138–453)
PMV BLD AUTO: 11 FL (ref 8.1–13.5)
RBC # BLD AUTO: 3.52 M/UL (ref 3.95–5.11)
TIBC SERPL-MCNC: 333 UG/DL (ref 250–450)
UNSATURATED IRON BINDING CAPACITY: 261 UG/DL (ref 112–347)
WBC OTHER # BLD: 6.4 K/UL (ref 3.5–11.3)

## 2024-11-15 ENCOUNTER — HOSPITAL ENCOUNTER (OUTPATIENT)
Age: 84
Setting detail: SPECIMEN
Discharge: HOME | End: 2024-11-15

## 2024-11-15 DIAGNOSIS — N30.00 ACUTE CYSTITIS WITHOUT HEMATURIA: ICD-10-CM

## 2024-11-15 LAB
BACTERIA URNS QL MICRO: NORMAL
BILIRUB UR QL STRIP: NEGATIVE
CASTS #/AREA URNS LPF: NORMAL /LPF (ref 0–8)
CLARITY UR: CLEAR
COLOR UR: YELLOW
EPI CELLS #/AREA URNS HPF: NORMAL /HPF (ref 0–5)
GLUCOSE UR STRIP-MCNC: NEGATIVE MG/DL
HGB UR QL STRIP.AUTO: NEGATIVE
KETONES UR STRIP-MCNC: NEGATIVE MG/DL
LEUKOCYTE ESTERASE UR QL STRIP: ABNORMAL
NITRITE UR QL STRIP: NEGATIVE
PH UR STRIP: 5.5 [PH] (ref 5–8)
PROT UR STRIP-MCNC: NEGATIVE MG/DL
RBC #/AREA URNS HPF: NORMAL /HPF (ref 0–4)
SP GR UR STRIP: 1.02 (ref 1–1.03)
UROBILINOGEN UR STRIP-ACNC: NORMAL EU/DL (ref 0–1)
WBC #/AREA URNS HPF: NORMAL /HPF (ref 0–5)

## 2024-11-25 RX ORDER — SIMVASTATIN 40 MG
40 TABLET ORAL NIGHTLY
Qty: 90 TABLET | Refills: 1 | Status: SHIPPED | OUTPATIENT
Start: 2024-11-25

## 2024-12-09 RX ORDER — CELECOXIB 200 MG/1
200 CAPSULE ORAL DAILY
Qty: 90 CAPSULE | Refills: 1 | Status: SHIPPED | OUTPATIENT
Start: 2024-12-09 | End: 2025-01-08

## 2024-12-09 NOTE — TELEPHONE ENCOUNTER
Request for 1 medication.      Next Visit Date:  Future Appointments   Date Time Provider Department Center   1/23/2025  1:00 PM Yvonne Sue DO [facility] Neurology -       Health Maintenance   Topic Date Due    DTaP/Tdap/Td vaccine (1 - Tdap) Never done    Respiratory Syncytial Virus (RSV) Pregnant or age 60 yrs+ (1 - 1-dose 75+ series) Never done    COVID-19 Vaccine (4 - 2023-24 season) 09/01/2024    Shingles vaccine (2 of 2) 11/01/2024    Lipids  10/28/2025    Depression Screen  11/07/2025    Annual Wellness Visit (Medicare Advantage)  Completed    Flu vaccine  Completed    Pneumococcal 65+ years Vaccine  Completed    Hepatitis A vaccine  Aged Out    Hepatitis B vaccine  Aged Out    Hib vaccine  Aged Out    Polio vaccine  Aged Out    Meningococcal (ACWY) vaccine  Aged Out    DEXA (modify frequency per FRAX score)  Discontinued       Hemoglobin A1C (%)   Date Value   11/07/2024 6.2             ( goal A1C is < 7)   No components found for: \"LABMICR\"  No components found for: \"LDLCHOLESTEROL\", \"LDLCALC\"    (goal LDL is <100)   AST (U/L)   Date Value   10/28/2024 25     ALT (U/L)   Date Value   10/28/2024 17     BUN (mg/dL)   Date Value   10/28/2024 32 (H)     BP Readings from Last 3 Encounters:   11/07/24 (!) 116/51   10/21/24 (!) 144/74   10/07/24 128/84          (goal 120/80)    All Future Testing planned in [facility]  Lab Frequency Next Occurrence   FACET JOINT L/S SINGLE Once 04/04/2024   Ferritin/Iron and TIBC Once 11/04/2024   POCT occult blood stool Once 11/27/2024   POCT occult blood stool Once 12/07/2024         Patient Active Problem List:     Diabetes mellitus (HCC)     High cholesterol     Arthritis     Vision abnormalities     Migraines     S/P KAYLEE-BSO     Lumbar radiculopathy     Lumbosacral spondylosis without myelopathy     Spinal stenosis of lumbar region with neurogenic claudication     Cervical spondylosis     Cervicalgia     Occipital neuralgia of right side     Asymptomatic menopausal state

## 2025-01-13 DIAGNOSIS — M48.062 SPINAL STENOSIS OF LUMBAR REGION WITH NEUROGENIC CLAUDICATION: Primary | Chronic | ICD-10-CM

## 2025-01-13 RX ORDER — TRAMADOL HYDROCHLORIDE 50 MG/1
50 TABLET ORAL EVERY 6 HOURS PRN
Qty: 30 TABLET | Refills: 0 | Status: SHIPPED | OUTPATIENT
Start: 2025-01-13 | End: 2025-02-12

## 2025-01-20 ENCOUNTER — HOSPITAL ENCOUNTER (OUTPATIENT)
Dept: PAIN MANAGEMENT | Age: 85
Discharge: HOME | End: 2025-01-20
Payer: MEDICARE

## 2025-01-20 VITALS — BODY MASS INDEX: 26.68 KG/M2 | WEIGHT: 145 LBS | HEIGHT: 62 IN

## 2025-01-20 DIAGNOSIS — M54.16 LUMBAR RADICULOPATHY: ICD-10-CM

## 2025-01-20 DIAGNOSIS — M48.062 SPINAL STENOSIS OF LUMBAR REGION WITH NEUROGENIC CLAUDICATION: Primary | Chronic | ICD-10-CM

## 2025-01-20 DIAGNOSIS — M47.817 LUMBOSACRAL SPONDYLOSIS WITHOUT MYELOPATHY: ICD-10-CM

## 2025-01-20 DIAGNOSIS — M47.812 CERVICAL SPONDYLOSIS: ICD-10-CM

## 2025-01-20 DIAGNOSIS — M54.50 CHRONIC LOW BACK PAIN, UNSPECIFIED BACK PAIN LATERALITY, UNSPECIFIED WHETHER SCIATICA PRESENT: ICD-10-CM

## 2025-01-20 DIAGNOSIS — G89.29 CHRONIC LOW BACK PAIN, UNSPECIFIED BACK PAIN LATERALITY, UNSPECIFIED WHETHER SCIATICA PRESENT: ICD-10-CM

## 2025-01-20 PROCEDURE — 99214 OFFICE O/P EST MOD 30 MIN: CPT | Performed by: NURSE PRACTITIONER

## 2025-01-20 PROCEDURE — 99213 OFFICE O/P EST LOW 20 MIN: CPT

## 2025-01-20 ASSESSMENT — PAIN DESCRIPTION - LOCATION: LOCATION: BACK

## 2025-01-20 ASSESSMENT — ENCOUNTER SYMPTOMS
BACK PAIN: 1
SHORTNESS OF BREATH: 0
BOWEL INCONTINENCE: 0
COUGH: 0
CONSTIPATION: 0

## 2025-01-20 ASSESSMENT — PAIN SCALES - GENERAL: PAINLEVEL_OUTOF10: 6

## 2025-01-20 NOTE — H&P (VIEW-ONLY)
Chief Complaint   Patient presents with    Back Pain        PMH    84-year-old female with history of chronic multisite body pain involving neck and low back  Leading of back pain on the left side with radiation down left leg that aggravates with standing and walking  Associated with intermittent numbness  No changes in bladder or bowel control  No focal weakness  MRI lumbar spine 2/2024 that showed lateral recess stenosis left-sided at L3-4 and L4-5  Patient has tried different modalities including therapy medications and injections  Discussed Vertiflex procedure at L3-4 and L4-5 interspinous spacers with Dr. Jean  She was going  to arrange family member for postop care but states she is taking care of her  and cannot have the procedure at this time.  She would like to schedule repeat radiofrequency ablation of median branches at the transverse processes of L3, L4 and L5 for on L3/4 and L4/5 facets left side. She states she had significant relief with this procedure in the past,    Back Pain  This is a chronic problem. The current episode started more than 1 year ago. The problem occurs constantly. The problem has been gradually worsening since onset. The pain is present in the lumbar spine. The quality of the pain is described as aching. The pain is at a severity of 7/10. The symptoms are aggravated by bending, twisting and position. Associated symptoms include leg pain. Pertinent negatives include no bladder incontinence, bowel incontinence, chest pain, fever, numbness, tingling or weakness. She has tried heat and ice for the symptoms.      Patient denies any new neurological symptoms. No bowel or bladder incontinence, no weakness, and no falling.    Past Medical History:   Diagnosis Date    Arthritis     Diabetes mellitus (HCC)     Essential hypertension 12/26/2023    High cholesterol     Hypothyroid 7/18/2024    Migraines     Vision abnormalities        Past Surgical History:   Procedure

## 2025-01-22 RX ORDER — BENAZEPRIL HYDROCHLORIDE 10 MG/1
10 TABLET ORAL DAILY
Qty: 90 TABLET | Refills: 1 | Status: SHIPPED | OUTPATIENT
Start: 2025-01-22

## 2025-01-22 RX ORDER — LEVOTHYROXINE SODIUM 50 UG/1
50 TABLET ORAL DAILY
Qty: 90 TABLET | Refills: 1 | Status: SHIPPED | OUTPATIENT
Start: 2025-01-22

## 2025-01-22 NOTE — PROGRESS NOTES
[facility] NEUROLOGY SPECIALIST  [address]  [place] [address]  Dept: [phone]    PATIENT NAME: Bibi Marrero  PATIENT MRN: [id number]  PRIMARY CARE PHYSICIAN: Chava Suazo DO    HPI:        Bibi Marrero is a 84 y.o. female presenting today as a new patient to me regarding occipital neuralgia.  She was previously followed by Dr. Kinza Landaverde.  Her history is summarized as follows:     Bibi Marrero was referred by Dr. Mckenzie for an ONB.  Patient notes she has had a long history of headaches that her radiate from the occipital to right frontal area starting at the end 2021.  Patient describes it as a burning pain that is constant. Patient notes it is very tender to the touch.  Patient had an MRI cervical spine in 2022 which showed multilevel degenerative changes with bilateral neural foraminal narrowing at multiple levels.     Bibi Marrero got an ONB by Dr. Jean which she reports was not effective. She has been seeing another neurologist. Her gabapentin was increased to 300 mg TID for neuropathy and neuralgia but it has not helped the occipital pain. She was taken off of it so she could take nortriptyline but her neuropathy got worse.  She was prescibred nortriptline 10 mg and fioricet for the headache but it was not effective.      Bibi Marrero underwent ONB with Dr. Landaverde in 2/2024 and this did reduce her pain.  She was on  mg TID as well. She also has neuropathy.    TODAY'S EVALUTION:    Bibi Marrero  was last seen in clinic by me on 10/21/24 for ONB.  She has been asymptomatic since that time.  She continues to have neuropathic pain elsewhere and takes  mg TID.  She is tolerating this well.  She has been trying to cut back on tramadol, which she takes for back pain.  She is planning for an ablation with pain management.  She was pain free from occipital neuralgia for about 8 months after her first nerve block.       HEADACHE SUMMARY:  Headache location: right temporal

## 2025-01-22 NOTE — TELEPHONE ENCOUNTER
Request for 2 medications 90 day supply.      Next Visit Date:  Future Appointments   Date Time Provider Department Center   1/23/2025  1:00 PM Yvonne Sue DO [facility] Neu[facility] -   2/12/2025 10:45 AM Stewart Jean MD [facility] [place]   3/12/2025  9:40 AM Tania Guerin, APRN - CNP [facility] [place]       Health Maintenance   Topic Date Due    DTaP/Tdap/Td vaccine (1 - Tdap) Never done    Respiratory Syncytial Virus (RSV) Pregnant or age 60 yrs+ (1 - 1-dose 75+ series) Never done    COVID-19 Vaccine (4 - 2023-24 season) 09/01/2024    Shingles vaccine (2 of 2) 11/01/2024    Annual Wellness Visit (Medicare Advantage)  01/01/2025    Lipids  10/28/2025    Depression Screen  11/07/2025    Flu vaccine  Completed    Pneumococcal 65+ years Vaccine  Completed    Hepatitis A vaccine  Aged Out    Hepatitis B vaccine  Aged Out    Hib vaccine  Aged Out    Polio vaccine  Aged Out    Meningococcal (ACWY) vaccine  Aged Out    DEXA (modify frequency per FRAX score)  Discontinued       Hemoglobin A1C (%)   Date Value   11/07/2024 6.2             ( goal A1C is < 7)   No components found for: \"LABMICR\"  No components found for: \"LDLCHOLESTEROL\", \"LDLCALC\"    (goal LDL is <100)   AST (U/L)   Date Value   10/28/2024 25     ALT (U/L)   Date Value   10/28/2024 17     BUN (mg/dL)   Date Value   10/28/2024 32 (H)     BP Readings from Last 3 Encounters:   11/07/24 (!) 116/51   10/21/24 (!) 144/74   10/07/24 128/84          (goal 120/80)    All Future Testing planned in [facility]  Lab Frequency Next Occurrence   Ferritin/Iron and TIBC Once 11/04/2024   POCT occult blood stool Once 11/27/2024   POCT occult blood stool Once 12/07/2024   FACET JOINT L/S SINGLE Once 01/20/2025         Patient Active Problem List:     Diabetes mellitus (HCC)     High cholesterol     Arthritis     Vision abnormalities     Migraines     S/P KAYLEE-BSO     Lumbar radiculopathy     Lumbosacral spondylosis without myelopathy     Spinal stenosis

## 2025-01-23 ENCOUNTER — OFFICE VISIT (OUTPATIENT)
Dept: NEUROLOGY | Age: 85
End: 2025-01-23
Payer: MEDICARE

## 2025-01-23 VITALS
WEIGHT: 147 LBS | DIASTOLIC BLOOD PRESSURE: 52 MMHG | BODY MASS INDEX: 27.05 KG/M2 | HEART RATE: 64 BPM | HEIGHT: 62 IN | SYSTOLIC BLOOD PRESSURE: 127 MMHG

## 2025-01-23 DIAGNOSIS — M54.81 BILATERAL OCCIPITAL NEURALGIA: Primary | ICD-10-CM

## 2025-01-23 DIAGNOSIS — G62.9 POLYNEUROPATHY: ICD-10-CM

## 2025-01-23 PROCEDURE — 1159F MED LIST DOCD IN RCRD: CPT | Performed by: PSYCHIATRY & NEUROLOGY

## 2025-01-23 PROCEDURE — 99214 OFFICE O/P EST MOD 30 MIN: CPT | Performed by: PSYCHIATRY & NEUROLOGY

## 2025-01-23 PROCEDURE — 1123F ACP DISCUSS/DSCN MKR DOCD: CPT | Performed by: PSYCHIATRY & NEUROLOGY

## 2025-01-23 RX ORDER — GABAPENTIN 400 MG/1
400 CAPSULE ORAL 3 TIMES DAILY
Qty: 270 CAPSULE | Refills: 3 | Status: SHIPPED | OUTPATIENT
Start: 2025-01-23 | End: 2026-01-18

## 2025-02-12 ENCOUNTER — HOSPITAL ENCOUNTER (OUTPATIENT)
Dept: PAIN MANAGEMENT | Facility: CLINIC | Age: 85
Discharge: HOME | End: 2025-02-12
Payer: MEDICARE

## 2025-02-12 VITALS
RESPIRATION RATE: 12 BRPM | HEART RATE: 55 BPM | WEIGHT: 140 LBS | BODY MASS INDEX: 25.76 KG/M2 | OXYGEN SATURATION: 98 % | HEIGHT: 62 IN | SYSTOLIC BLOOD PRESSURE: 135 MMHG | TEMPERATURE: 98 F | DIASTOLIC BLOOD PRESSURE: 65 MMHG

## 2025-02-12 DIAGNOSIS — R52 PAIN MANAGEMENT: ICD-10-CM

## 2025-02-12 DIAGNOSIS — M47.817 LUMBOSACRAL SPONDYLOSIS WITHOUT MYELOPATHY: Primary | ICD-10-CM

## 2025-02-12 LAB — GLUCOSE BLD-MCNC: 109 MG/DL (ref 65–105)

## 2025-02-12 PROCEDURE — 6360000002 HC RX W HCPCS: Performed by: ANESTHESIOLOGY

## 2025-02-12 PROCEDURE — 64636 DESTROY L/S FACET JNT ADDL: CPT

## 2025-02-12 PROCEDURE — 64635 DESTROY LUMB/SAC FACET JNT: CPT

## 2025-02-12 PROCEDURE — 82947 ASSAY GLUCOSE BLOOD QUANT: CPT

## 2025-02-12 RX ORDER — MIDAZOLAM HYDROCHLORIDE 2 MG/2ML
INJECTION, SOLUTION INTRAMUSCULAR; INTRAVENOUS
Status: COMPLETED | OUTPATIENT
Start: 2025-02-12 | End: 2025-02-12

## 2025-02-12 RX ORDER — LIDOCAINE HYDROCHLORIDE 10 MG/ML
INJECTION, SOLUTION EPIDURAL; INFILTRATION; INTRACAUDAL; PERINEURAL
Status: COMPLETED | OUTPATIENT
Start: 2025-02-12 | End: 2025-02-12

## 2025-02-12 RX ORDER — FENTANYL CITRATE 50 UG/ML
INJECTION, SOLUTION INTRAMUSCULAR; INTRAVENOUS
Status: COMPLETED | OUTPATIENT
Start: 2025-02-12 | End: 2025-02-12

## 2025-02-12 RX ORDER — LIDOCAINE HYDROCHLORIDE 40 MG/ML
INJECTION, SOLUTION RETROBULBAR
Status: COMPLETED | OUTPATIENT
Start: 2025-02-12 | End: 2025-02-12

## 2025-02-12 RX ADMIN — LIDOCAINE HYDROCHLORIDE 5 ML: 10 INJECTION, SOLUTION EPIDURAL; INFILTRATION; INTRACAUDAL at 10:43

## 2025-02-12 RX ADMIN — FENTANYL CITRATE 50 MCG: 50 INJECTION, SOLUTION INTRAMUSCULAR; INTRAVENOUS at 10:43

## 2025-02-12 RX ADMIN — LIDOCAINE HYDROCHLORIDE 3 ML: 40 SOLUTION RETROBULBAR; TOPICAL at 10:49

## 2025-02-12 RX ADMIN — MIDAZOLAM HYDROCHLORIDE 1 MG: 1 INJECTION, SOLUTION INTRAMUSCULAR; INTRAVENOUS at 10:43

## 2025-02-12 ASSESSMENT — PAIN DESCRIPTION - DESCRIPTORS: DESCRIPTORS: ACHING

## 2025-02-12 ASSESSMENT — PAIN - FUNCTIONAL ASSESSMENT
PAIN_FUNCTIONAL_ASSESSMENT: PREVENTS OR INTERFERES WITH ALL ACTIVE AND SOME PASSIVE ACTIVITIES
PAIN_FUNCTIONAL_ASSESSMENT: 0-10
PAIN_FUNCTIONAL_ASSESSMENT: NONE - DENIES PAIN

## 2025-02-12 NOTE — DISCHARGE INSTRUCTIONS
Discharge Instructions following Sedation or Anesthesia:  You have  received  a sedative/anesthetic therefore, you should not consume any alcoholic beverages for minimum of 12 hours.  Do not drive or operate machinery for 24 hours.  Do not sign legal documents for 24 hours.  Dizziness, drowsiness, and unsteadiness may occur.  Rest when need to.  Increase diet as tolerated.  Keep up on fluids if diet allows.      General Instructions:  Do not take a tub bath for 72 hours after procedure (this includes hot tubs and swimming pools).  You may shower, but avoid hot water to injection site.   Avoid strenuous activity TODAY especially if you experience dizziness.   Remove band-aid the next day.  Wash off any residual iodine   Do not use heat, heating pad, or any other heating device over the injection site for 3 days after the procedure.  If you experience pain after your procedure, you may continue with your current pain medication as prescribed.  (DO NOT INCREASE YOUR PAIN MEDICATION WITHOUT TALKING TO DOCTOR)  Soreness and pain at injection site is common, may use ice to reduce soreness.    Call [facility] at [phone] if you experience:   Fever, chills or temperature over 100    Vomiting, Headache, persistent stiff neck, nausea, blurred vision   Difficulty in urinating or unable to urinate with 8 hours   Increase in weakness, numbness or loss of function   Increased redness, swelling or drainage at the injection site

## 2025-02-12 NOTE — INTERVAL H&P NOTE
Update History & Physical    The patient's History and Physical of January 20, 2025 was reviewed with the patient and I examined the patient. There was no change. The surgical site was confirmed by the patient and me.     Plan: The risks, benefits, expected outcome, and alternative to the recommended procedure have been discussed with the patient. Patient understands and wants to proceed with the procedure.     ASA 3  MP 3    Electronically signed by Stewart Jean MD on 2/12/2025 at 10:11 AM

## 2025-02-12 NOTE — OP NOTE
Preoperative Diagnosis: Lumbar spondylosis w/o myelopathy, chronic low back pain  Postoperative Diagnosis: Lumbar spondylosis w/o myelopathy, chronic low back pain  SEDATION: SEE SEDATION NOTE  BLOOD LOSS: NONE    Procedure Performed:  :Radiofrequency ablation of median branches at the Transverse processes of L3, L4 AND L5 for L3/4 AND L4/5 facet joints on Left under fluoroscopic guidance with IV sedation    Procedure:    After starting an IV, the patient was taken to procedure room.  The patient was placed in prone position and skin over the back was prepped and draped in sterile manner.    Standard monitors were connected and vitals were monitored during the case and they remained stable during the procedure.    A meaningful communication was kept up with the patient throughout the procedure.    Then using fluoroscopy the junction of the transverse process of the target vertebra with the superior process of the facet joint was observed and the view was optimized.  The skin and deep tissues were infiltrated with 2 ml of  1 % lidocaine. The RF canula with the 10 mm active tip was introduced through the skin wheal under fluoroscopy guidance such that the tip of the needle lies in the groove of the transverse process with the superior processes of the facet joint.  Then a lateral and AP view of the lumbar spine was obtained to make sure the tip of needle is not in the neural foramen.  Then electric impedence was checked to make sure it is acceptable. Then a sensory stimulus was applied at 50 Hz up to 0.5 volt and concordant pain symptoms were reproduced. Then a motor stimulus was applied at 2 Hz up to 2 volts or 3 x times the sensory stimulus and no motor stimulation was seen in lower extremities.  Some multifidus stimulus was seen.  Then after negative aspiration 1 ml of 4% lidocaine was injected through the needle at each level.The radiofrequency lesion was done at 85 degrees centigrade for 110 seconds.

## 2025-02-17 ENCOUNTER — OFFICE VISIT (OUTPATIENT)
Age: 85
End: 2025-02-17
Payer: MEDICARE

## 2025-02-17 ENCOUNTER — TELEPHONE (OUTPATIENT)
Dept: FAMILY MEDICINE CLINIC | Age: 85
End: 2025-02-17

## 2025-02-17 VITALS
HEIGHT: 62 IN | RESPIRATION RATE: 14 BRPM | BODY MASS INDEX: 26.5 KG/M2 | DIASTOLIC BLOOD PRESSURE: 62 MMHG | TEMPERATURE: 97 F | HEART RATE: 60 BPM | WEIGHT: 144 LBS | SYSTOLIC BLOOD PRESSURE: 132 MMHG | OXYGEN SATURATION: 96 %

## 2025-02-17 DIAGNOSIS — E11.69 TYPE 2 DIABETES MELLITUS WITH OTHER SPECIFIED COMPLICATION, WITHOUT LONG-TERM CURRENT USE OF INSULIN (HCC): Primary | ICD-10-CM

## 2025-02-17 DIAGNOSIS — M48.062 SPINAL STENOSIS OF LUMBAR REGION WITH NEUROGENIC CLAUDICATION: Chronic | ICD-10-CM

## 2025-02-17 DIAGNOSIS — E78.00 HIGH CHOLESTEROL: ICD-10-CM

## 2025-02-17 DIAGNOSIS — M17.12 PRIMARY OSTEOARTHRITIS OF LEFT KNEE: ICD-10-CM

## 2025-02-17 DIAGNOSIS — Z78.0 ASYMPTOMATIC MENOPAUSAL STATE: ICD-10-CM

## 2025-02-17 DIAGNOSIS — M54.16 LUMBAR RADICULOPATHY: ICD-10-CM

## 2025-02-17 DIAGNOSIS — E03.9 ACQUIRED HYPOTHYROIDISM: ICD-10-CM

## 2025-02-17 DIAGNOSIS — M47.817 LUMBOSACRAL SPONDYLOSIS WITHOUT MYELOPATHY: ICD-10-CM

## 2025-02-17 DIAGNOSIS — M81.0 AGE-RELATED OSTEOPOROSIS WITHOUT CURRENT PATHOLOGICAL FRACTURE: ICD-10-CM

## 2025-02-17 DIAGNOSIS — N81.4 CYSTOCELE WITH PROLAPSE: ICD-10-CM

## 2025-02-17 DIAGNOSIS — I10 ESSENTIAL HYPERTENSION: ICD-10-CM

## 2025-02-17 DIAGNOSIS — N81.11 MIDLINE CYSTOCELE: ICD-10-CM

## 2025-02-17 LAB
CHP ED QC CHECK: NORMAL
GLUCOSE BLD-MCNC: 172 MG/DL
HBA1C MFR BLD: 5.9 %

## 2025-02-17 PROCEDURE — 1123F ACP DISCUSS/DSCN MKR DOCD: CPT | Performed by: FAMILY MEDICINE

## 2025-02-17 PROCEDURE — 3044F HG A1C LEVEL LT 7.0%: CPT | Performed by: FAMILY MEDICINE

## 2025-02-17 PROCEDURE — 83036 HEMOGLOBIN GLYCOSYLATED A1C: CPT | Performed by: FAMILY MEDICINE

## 2025-02-17 PROCEDURE — 99214 OFFICE O/P EST MOD 30 MIN: CPT | Performed by: FAMILY MEDICINE

## 2025-02-17 PROCEDURE — 1159F MED LIST DOCD IN RCRD: CPT | Performed by: FAMILY MEDICINE

## 2025-02-17 PROCEDURE — 82962 GLUCOSE BLOOD TEST: CPT | Performed by: FAMILY MEDICINE

## 2025-02-17 RX ORDER — TRAMADOL HYDROCHLORIDE 50 MG/1
50 TABLET ORAL EVERY 6 HOURS PRN
COMMUNITY
End: 2025-02-17 | Stop reason: SDUPTHER

## 2025-02-17 RX ORDER — TRAMADOL HYDROCHLORIDE 50 MG/1
50 TABLET ORAL 2 TIMES DAILY PRN
Qty: 60 TABLET | Refills: 0 | Status: SHIPPED | OUTPATIENT
Start: 2025-02-17 | End: 2025-03-19

## 2025-02-17 SDOH — ECONOMIC STABILITY: FOOD INSECURITY: WITHIN THE PAST 12 MONTHS, YOU WORRIED THAT YOUR FOOD WOULD RUN OUT BEFORE YOU GOT MONEY TO BUY MORE.: NEVER TRUE

## 2025-02-17 SDOH — ECONOMIC STABILITY: FOOD INSECURITY: WITHIN THE PAST 12 MONTHS, THE FOOD YOU BOUGHT JUST DIDN'T LAST AND YOU DIDN'T HAVE MONEY TO GET MORE.: NEVER TRUE

## 2025-02-17 ASSESSMENT — PATIENT HEALTH QUESTIONNAIRE - PHQ9
SUM OF ALL RESPONSES TO PHQ QUESTIONS 1-9: 0
2. FEELING DOWN, DEPRESSED OR HOPELESS: NOT AT ALL
SUM OF ALL RESPONSES TO PHQ9 QUESTIONS 1 & 2: 0
1. LITTLE INTEREST OR PLEASURE IN DOING THINGS: NOT AT ALL
SUM OF ALL RESPONSES TO PHQ QUESTIONS 1-9: 0

## 2025-02-17 NOTE — PROGRESS NOTES
cooperative, no distress, appears stated age.  Normocephalic, without obvious abnormality, atraumatic   Eyes:  Conjunctivae/corneas clear. PERRL, EOMs intact. Fundi benign   Ears:  Normal TMs and external ear canals both ears.   Nose: Nares normal. Septum midline. Mucosa normal. No drainage or sinus tenderness.   Mouth/Throat: Lips, mucosa, and tongue normal. Teeth and gums normal.   Neck: Supple, symmetrical, trachea midline, no adenopathy, thyroid: no enlargement/tenderness/nodules, no carotid bruit and no JVD.   Back:   Symmetric, no curvature. ROM normal. No CVA tenderness.  Still has some tenderness to the lumbar lower back in the midline and paraspinal muscles in the sacroiliac joints bilaterally   Lungs:   Clear to auscultation bilaterally.   Heart:  Regular rate and rhythm, S1, S2 normal, no murmur, click, rub or gallop.   Abdomen:   Soft, non-tender. Bowel sounds normal. No masses, No organomegaly.  No tenderness sensation okay strength is   Extremities: Extremities normal, atraumatic, no cyanosis or edema.   Pulses: 2+ and symmetric all extremities.   Skin: Skin color, texture, turgor normal. No rashes or lesions   Lymph nodes: Cervical, supraclavicular, and axillary nodes normal.   Neurologic: CNII-XII intact. Normal strength, sensation, and reflexes throughout.     ASSESSMENT/PLAN:  1. Type 2 diabetes mellitus with other specified complication, without long-term current use of insulin ([facility])  -     Basic Metabolic Panel; Future  -     CBC with Auto Differential; Future  -     Urine Drug Screen; Future  -     POCT Glucose  -     POCT glycosylated hemoglobin (Hb A1C)  2. Spinal stenosis of lumbar region with neurogenic claudication  -     traMADol (ULTRAM) 50 MG tablet; Take 1 tablet by mouth 2 times daily as needed for Pain for up to 30 days. Max Daily Amount: 100 mg, Disp-60 tablet, R-0Normal  -     Urine Drug Screen; Future  3. High cholesterol  4. Lumbar radiculopathy  -     traMADol (ULTRAM) 50 MG

## 2025-03-05 RX ORDER — AVOBENZONE, HOMOSALATE, OCTISALATE, OCTOCRYLENE 30; 40; 45; 26 MG/ML; MG/ML; MG/ML; MG/ML
1 CREAM TOPICAL DAILY
Qty: 100 EACH | Refills: 11 | Status: SHIPPED | OUTPATIENT
Start: 2025-03-05

## 2025-03-05 NOTE — TELEPHONE ENCOUNTER
Request for 1 medication.      Next Visit Date:  Future Appointments   Date Time Provider Department Center   3/12/2025  9:40 AM Tania Guerin, APRN - CNP [facility] [place]   6/26/2025  1:00 PM Yvonne Sue,  [facility] Neurology -       Health Maintenance   Topic Date Due    DTaP/Tdap/Td vaccine (1 - Tdap) Never done    Respiratory Syncytial Virus (RSV) Pregnant or age 60 yrs+ (1 - 1-dose 75+ series) Never done    COVID-19 Vaccine (4 - 2024-25 season) 09/01/2024    Annual Wellness Visit (Medicare Advantage)  01/01/2025    Lipids  10/28/2025    Depression Screen  02/17/2026    Flu vaccine  Completed    Shingles vaccine  Completed    Pneumococcal 50+ years Vaccine  Completed    Hepatitis A vaccine  Aged Out    Hepatitis B vaccine  Aged Out    Hib vaccine  Aged Out    Polio vaccine  Aged Out    Meningococcal (ACWY) vaccine  Aged Out    DEXA (modify frequency per FRAX score)  Discontinued       Hemoglobin A1C (%)   Date Value   02/17/2025 5.9   11/07/2024 6.2             ( goal A1C is < 7)   No components found for: \"LABMICR\"  No components found for: \"LDLCHOLESTEROL\", \"LDLCALC\"    (goal LDL is <100)   AST (U/L)   Date Value   10/28/2024 25     ALT (U/L)   Date Value   10/28/2024 17     BUN (mg/dL)   Date Value   10/28/2024 32 (H)     BP Readings from Last 3 Encounters:   02/17/25 132/62   02/12/25 135/65   01/23/25 (!) 127/52          (goal 120/80)    All Future Testing planned in [facility]  Lab Frequency Next Occurrence   Ferritin/Iron and TIBC Once 11/04/2024   POCT occult blood stool Once 11/27/2024   POCT occult blood stool Once 12/07/2024   FACET JOINT L/S SINGLE Once 01/20/2025   Basic Metabolic Panel Once 02/24/2025   CBC with Auto Differential Once 02/24/2025   Urine Drug Screen Once 02/17/2025         Patient Active Problem List:     Diabetes mellitus (HCC)     High cholesterol     Arthritis     Vision abnormalities     Migraines     S/P KAYLEE-BSO     Lumbar radiculopathy     Lumbosacral

## 2025-03-12 ENCOUNTER — HOSPITAL ENCOUNTER (OUTPATIENT)
Dept: PAIN MANAGEMENT | Age: 85
Discharge: HOME | End: 2025-03-12
Payer: MEDICARE

## 2025-03-12 VITALS — BODY MASS INDEX: 26.5 KG/M2 | HEIGHT: 62 IN | WEIGHT: 144 LBS

## 2025-03-12 DIAGNOSIS — M47.817 LUMBOSACRAL SPONDYLOSIS WITHOUT MYELOPATHY: ICD-10-CM

## 2025-03-12 DIAGNOSIS — M54.2 CERVICALGIA: ICD-10-CM

## 2025-03-12 DIAGNOSIS — M48.062 SPINAL STENOSIS OF LUMBAR REGION WITH NEUROGENIC CLAUDICATION: Primary | Chronic | ICD-10-CM

## 2025-03-12 DIAGNOSIS — M47.812 CERVICAL SPONDYLOSIS: ICD-10-CM

## 2025-03-12 PROCEDURE — 99213 OFFICE O/P EST LOW 20 MIN: CPT | Performed by: NURSE PRACTITIONER

## 2025-03-12 PROCEDURE — 99213 OFFICE O/P EST LOW 20 MIN: CPT

## 2025-03-12 ASSESSMENT — ENCOUNTER SYMPTOMS
CONSTIPATION: 0
COUGH: 0
SHORTNESS OF BREATH: 0
BACK PAIN: 1
BOWEL INCONTINENCE: 0

## 2025-03-12 ASSESSMENT — PAIN SCALES - GENERAL: PAINLEVEL_OUTOF10: 3

## 2025-03-12 NOTE — PROGRESS NOTES
Chief Complaint   Patient presents with    Back Pain    Follow Up After Procedure     RFA         [facility]   Pt is here for follow up after left lumbar RFA 2/12/25. She reports 75% improvement in pain and function following the procedure. She states her  is now in assisted living and she no longer has to lift him which has helped with her pain as well.     Back Pain  This is a chronic problem. The current episode started more than 1 year ago. The problem occurs intermittently. The problem has been gradually improving since onset. The pain is present in the lumbar spine. The quality of the pain is described as aching and burning. The pain does not radiate. The pain is at a severity of 3/10. The pain is mild. The pain is Worse during the day. The symptoms are aggravated by bending, position, sitting, standing and twisting. Pertinent negatives include no bladder incontinence, bowel incontinence, chest pain or fever. She has tried chiropractic manipulation and analgesics for the symptoms.     Patient denies any new neurological symptoms. No bowel or bladder incontinence, no weakness, and no falling.    Past Medical History:   Diagnosis Date    Arthritis     Diabetes mellitus (HCC)     Essential hypertension 12/26/2023    High cholesterol     Hypothyroid 7/18/2024    Migraines     Vision abnormalities        Past Surgical History:   Procedure Laterality Date    APPENDECTOMY  1949    CHOLECYSTECTOMY  1971    COLONOSCOPY  10/12/07    HYSTERECTOMY (CERVIX STATUS UNKNOWN)  1983    Ovaries removed    ORIF HUMERUS DECOMPRESSION Left 2010    PAIN MANAGEMENT PROCEDURE      SALPINGO-OOPHORECTOMY  1983    TONSILLECTOMY AND ADENOIDECTOMY  1962       Allergies   Allergen Reactions    Aspirin      Intolerance. Upsets stomach    Duricef [Cefadroxil] Hives    Lipitor [Atorvastatin Calcium]      Muscle pain    Macrobid [Nitrofurantoin Monohyd Macro] Hives    Sulfa Antibiotics          Current Outpatient Medications:

## 2025-03-14 DIAGNOSIS — M48.062 SPINAL STENOSIS OF LUMBAR REGION WITH NEUROGENIC CLAUDICATION: ICD-10-CM

## 2025-03-14 DIAGNOSIS — M54.16 LUMBAR RADICULOPATHY: ICD-10-CM

## 2025-03-14 DIAGNOSIS — M47.817 LUMBOSACRAL SPONDYLOSIS WITHOUT MYELOPATHY: ICD-10-CM

## 2025-03-14 RX ORDER — TRAMADOL HYDROCHLORIDE 50 MG/1
50 TABLET ORAL 2 TIMES DAILY PRN
Qty: 60 TABLET | Refills: 0 | Status: SHIPPED | OUTPATIENT
Start: 2025-03-14 | End: 2025-04-13

## 2025-03-14 NOTE — TELEPHONE ENCOUNTER
Patient is requesting a new One Touch Delica Plus Device for her lancets. Her other one lacents do not fit. She would like this sent to [facility] [address]. Please advise.

## 2025-03-17 ENCOUNTER — HOSPITAL ENCOUNTER (OUTPATIENT)
Age: 85
Setting detail: SPECIMEN
Discharge: HOME | End: 2025-03-17

## 2025-03-17 DIAGNOSIS — I10 ESSENTIAL HYPERTENSION: ICD-10-CM

## 2025-03-17 DIAGNOSIS — E11.69 TYPE 2 DIABETES MELLITUS WITH OTHER SPECIFIED COMPLICATION, WITHOUT LONG-TERM CURRENT USE OF INSULIN: ICD-10-CM

## 2025-03-17 LAB
ANION GAP SERPL CALCULATED.3IONS-SCNC: 11 MMOL/L (ref 9–16)
BASOPHILS # BLD: 0.08 K/UL (ref 0–0.2)
BASOPHILS NFR BLD: 1 % (ref 0–2)
BUN SERPL-MCNC: 43 MG/DL (ref 8–23)
CALCIUM SERPL-MCNC: 10 MG/DL (ref 8.6–10.4)
CHLORIDE SERPL-SCNC: 107 MMOL/L (ref 98–107)
CO2 SERPL-SCNC: 27 MMOL/L (ref 20–31)
CREAT SERPL-MCNC: 1.3 MG/DL (ref 0.6–0.9)
EOSINOPHIL # BLD: 0.11 K/UL (ref 0–0.44)
EOSINOPHILS RELATIVE PERCENT: 2 % (ref 1–4)
ERYTHROCYTE [DISTWIDTH] IN BLOOD BY AUTOMATED COUNT: 13.6 % (ref 11.8–14.4)
GFR, ESTIMATED: 40 ML/MIN/1.73M2
GLUCOSE SERPL-MCNC: 90 MG/DL (ref 74–99)
HCT VFR BLD AUTO: 33.5 % (ref 36.3–47.1)
HGB BLD-MCNC: 10.6 G/DL (ref 11.9–15.1)
IMM GRANULOCYTES # BLD AUTO: <0.03 K/UL (ref 0–0.3)
IMM GRANULOCYTES NFR BLD: 0 %
LYMPHOCYTES NFR BLD: 1.34 K/UL (ref 1.1–3.7)
LYMPHOCYTES RELATIVE PERCENT: 23 % (ref 24–43)
MCH RBC QN AUTO: 29.5 PG (ref 25.2–33.5)
MCHC RBC AUTO-ENTMCNC: 31.6 G/DL (ref 28.4–34.8)
MCV RBC AUTO: 93.3 FL (ref 82.6–102.9)
MONOCYTES NFR BLD: 0.58 K/UL (ref 0.1–1.2)
MONOCYTES NFR BLD: 10 % (ref 3–12)
NEUTROPHILS NFR BLD: 64 % (ref 36–65)
NEUTS SEG NFR BLD: 3.71 K/UL (ref 1.5–8.1)
NRBC BLD-RTO: 0 PER 100 WBC
PLATELET # BLD AUTO: 227 K/UL (ref 138–453)
PMV BLD AUTO: 11.1 FL (ref 8.1–13.5)
POTASSIUM SERPL-SCNC: 5.8 MMOL/L (ref 3.7–5.3)
RBC # BLD AUTO: 3.59 M/UL (ref 3.95–5.11)
SODIUM SERPL-SCNC: 145 MMOL/L (ref 136–145)
WBC OTHER # BLD: 5.8 K/UL (ref 3.5–11.3)

## 2025-03-18 ENCOUNTER — RESULTS FOLLOW-UP (OUTPATIENT)
Age: 85
End: 2025-03-18

## 2025-03-18 ENCOUNTER — HOSPITAL ENCOUNTER (OUTPATIENT)
Age: 85
Setting detail: SPECIMEN
Discharge: HOME | End: 2025-03-18

## 2025-03-18 DIAGNOSIS — E11.69 TYPE 2 DIABETES MELLITUS WITH OTHER SPECIFIED COMPLICATION, WITHOUT LONG-TERM CURRENT USE OF INSULIN: ICD-10-CM

## 2025-03-18 DIAGNOSIS — E87.5 HYPERKALEMIA: Primary | ICD-10-CM

## 2025-03-18 DIAGNOSIS — M48.062 SPINAL STENOSIS OF LUMBAR REGION WITH NEUROGENIC CLAUDICATION: ICD-10-CM

## 2025-03-18 DIAGNOSIS — M54.16 LUMBAR RADICULOPATHY: ICD-10-CM

## 2025-03-18 DIAGNOSIS — E87.5 HYPERKALEMIA: ICD-10-CM

## 2025-03-18 LAB
ANION GAP SERPL CALCULATED.3IONS-SCNC: 10 MMOL/L (ref 9–16)
BUN SERPL-MCNC: 39 MG/DL (ref 8–23)
CALCIUM SERPL-MCNC: 9.2 MG/DL (ref 8.6–10.4)
CHLORIDE SERPL-SCNC: 103 MMOL/L (ref 98–107)
CO2 SERPL-SCNC: 27 MMOL/L (ref 20–31)
CREAT SERPL-MCNC: 1.3 MG/DL (ref 0.6–0.9)
GFR, ESTIMATED: 40 ML/MIN/1.73M2
GLUCOSE SERPL-MCNC: 81 MG/DL (ref 74–99)
POTASSIUM SERPL-SCNC: 5.9 MMOL/L (ref 3.7–5.3)
SODIUM SERPL-SCNC: 140 MMOL/L (ref 136–145)

## 2025-03-18 RX ORDER — AMLODIPINE BESYLATE 5 MG/1
5 TABLET ORAL DAILY
Qty: 30 TABLET | Refills: 5 | Status: SHIPPED | OUTPATIENT
Start: 2025-03-18

## 2025-03-20 ENCOUNTER — HOSPITAL ENCOUNTER (OUTPATIENT)
Age: 85
Setting detail: SPECIMEN
Discharge: HOME | End: 2025-03-20

## 2025-03-20 RX ORDER — PRAVASTATIN SODIUM 40 MG
40 TABLET ORAL DAILY
Qty: 30 TABLET | Refills: 5 | Status: SHIPPED | OUTPATIENT
Start: 2025-03-20

## 2025-03-21 LAB
AMPHET UR QL SCN: NEGATIVE
BARBITURATES UR QL SCN: NEGATIVE
BENZODIAZ UR QL: NEGATIVE
CANNABINOIDS UR QL SCN: NEGATIVE
COCAINE UR QL SCN: NEGATIVE
FENTANYL UR QL: NEGATIVE
METHADONE UR QL: NEGATIVE
OPIATES UR QL SCN: NEGATIVE
OXYCODONE UR QL SCN: NEGATIVE
PCP UR QL SCN: NEGATIVE
TEST INFORMATION: NORMAL

## 2025-03-24 ENCOUNTER — RESULTS FOLLOW-UP (OUTPATIENT)
Age: 85
End: 2025-03-24

## 2025-03-24 ENCOUNTER — HOSPITAL ENCOUNTER (OUTPATIENT)
Age: 85
Setting detail: SPECIMEN
Discharge: HOME | End: 2025-03-24

## 2025-03-24 ENCOUNTER — TELEPHONE (OUTPATIENT)
Age: 85
End: 2025-03-24

## 2025-03-24 DIAGNOSIS — E87.5 HYPERKALEMIA: Primary | ICD-10-CM

## 2025-03-24 DIAGNOSIS — E87.5 HYPERKALEMIA: ICD-10-CM

## 2025-03-24 LAB
ANION GAP SERPL CALCULATED.3IONS-SCNC: 8 MMOL/L (ref 9–16)
BUN SERPL-MCNC: 36 MG/DL (ref 8–23)
CALCIUM SERPL-MCNC: 10.3 MG/DL (ref 8.6–10.4)
CHLORIDE SERPL-SCNC: 106 MMOL/L (ref 98–107)
CO2 SERPL-SCNC: 29 MMOL/L (ref 20–31)
CREAT SERPL-MCNC: 1.2 MG/DL (ref 0.6–0.9)
GFR, ESTIMATED: 44 ML/MIN/1.73M2
GLUCOSE SERPL-MCNC: 65 MG/DL (ref 74–99)
POTASSIUM SERPL-SCNC: 5.8 MMOL/L (ref 3.7–5.3)
SODIUM SERPL-SCNC: 143 MMOL/L (ref 136–145)

## 2025-03-25 ENCOUNTER — HOSPITAL ENCOUNTER (OUTPATIENT)
Age: 85
Discharge: HOME | End: 2025-03-25
Payer: MEDICARE

## 2025-03-25 ENCOUNTER — RESULTS FOLLOW-UP (OUTPATIENT)
Age: 85
End: 2025-03-25

## 2025-03-25 DIAGNOSIS — E87.5 HYPERKALEMIA: ICD-10-CM

## 2025-03-25 LAB — POTASSIUM SERPL-SCNC: 4.7 MMOL/L (ref 3.7–5.3)

## 2025-03-25 PROCEDURE — 84132 ASSAY OF SERUM POTASSIUM: CPT

## 2025-03-25 PROCEDURE — 36415 COLL VENOUS BLD VENIPUNCTURE: CPT

## 2025-04-03 RX ORDER — CARVEDILOL 25 MG/1
25 TABLET ORAL 2 TIMES DAILY
Qty: 180 TABLET | Refills: 3 | Status: SHIPPED | OUTPATIENT
Start: 2025-04-03

## 2025-04-11 RX ORDER — BLOOD SUGAR DIAGNOSTIC
1 STRIP MISCELLANEOUS 2 TIMES DAILY
Qty: 100 EACH | Refills: 11 | Status: SHIPPED | OUTPATIENT
Start: 2025-04-11 | End: 2025-05-11

## 2025-04-17 DIAGNOSIS — M54.16 LUMBAR RADICULOPATHY: ICD-10-CM

## 2025-04-17 DIAGNOSIS — M48.062 SPINAL STENOSIS OF LUMBAR REGION WITH NEUROGENIC CLAUDICATION: Primary | ICD-10-CM

## 2025-04-17 DIAGNOSIS — M47.817 LUMBOSACRAL SPONDYLOSIS WITHOUT MYELOPATHY: ICD-10-CM

## 2025-04-17 RX ORDER — TRAMADOL HYDROCHLORIDE 50 MG/1
50 TABLET ORAL 2 TIMES DAILY PRN
Qty: 60 TABLET | Refills: 0 | Status: SHIPPED | OUTPATIENT
Start: 2025-04-17 | End: 2025-05-17

## 2025-04-17 NOTE — TELEPHONE ENCOUNTER
Refill for Tramadol    Last Visit: 2/17/25  
Bed/Stretcher in lowest position, wheels locked, appropriate side rails in place/Call bell, personal items and telephone in reach/Instruct patient to call for assistance before getting out of bed/chair/stretcher/Non-slip footwear applied when patient is off stretcher/[place] to call system/Physically safe environment - no spills, clutter or unnecessary equipment/Purposeful proactive rounding/Room/bathroom lighting operational, light cord in reach

## 2025-05-06 RX ORDER — OMEPRAZOLE 20 MG/1
20 CAPSULE, DELAYED RELEASE ORAL DAILY
Qty: 90 CAPSULE | Refills: 3 | Status: SHIPPED | OUTPATIENT
Start: 2025-05-06

## 2025-05-16 DIAGNOSIS — M54.16 LUMBAR RADICULOPATHY: ICD-10-CM

## 2025-05-16 DIAGNOSIS — M48.062 SPINAL STENOSIS OF LUMBAR REGION WITH NEUROGENIC CLAUDICATION: ICD-10-CM

## 2025-05-16 DIAGNOSIS — M47.817 LUMBOSACRAL SPONDYLOSIS WITHOUT MYELOPATHY: ICD-10-CM

## 2025-05-16 RX ORDER — TRAMADOL HYDROCHLORIDE 50 MG/1
50 TABLET ORAL 2 TIMES DAILY PRN
Qty: 60 TABLET | Refills: 0 | Status: SHIPPED | OUTPATIENT
Start: 2025-05-16 | End: 2025-06-15

## 2025-06-09 RX ORDER — CELECOXIB 200 MG/1
200 CAPSULE ORAL DAILY
Qty: 90 CAPSULE | Refills: 1 | Status: SHIPPED | OUTPATIENT
Start: 2025-06-09

## 2025-06-09 NOTE — TELEPHONE ENCOUNTER
Pharmacy requesting refill of Celecoxib Celebrex 200 MG Capsule .      Medication active on med list yes      Date of last Rx:12/09/2024 with 1 refills          verified by SISSY WILHELM      Date of last appointment 02/17/2025    Next Visit Date:  Visit date not found

## 2025-06-13 DIAGNOSIS — M47.817 LUMBOSACRAL SPONDYLOSIS WITHOUT MYELOPATHY: ICD-10-CM

## 2025-06-13 DIAGNOSIS — M54.16 LUMBAR RADICULOPATHY: ICD-10-CM

## 2025-06-13 DIAGNOSIS — M48.062 SPINAL STENOSIS OF LUMBAR REGION WITH NEUROGENIC CLAUDICATION: ICD-10-CM

## 2025-06-13 RX ORDER — PRAVASTATIN SODIUM 40 MG
40 TABLET ORAL DAILY
Qty: 90 TABLET | Refills: 1 | Status: SHIPPED | OUTPATIENT
Start: 2025-06-13 | End: 2025-09-11

## 2025-06-13 RX ORDER — TRAMADOL HYDROCHLORIDE 50 MG/1
50 TABLET ORAL 2 TIMES DAILY PRN
Qty: 60 TABLET | Refills: 0 | Status: SHIPPED | OUTPATIENT
Start: 2025-06-13 | End: 2025-07-13

## 2025-06-16 DIAGNOSIS — M48.062 SPINAL STENOSIS OF LUMBAR REGION WITH NEUROGENIC CLAUDICATION: ICD-10-CM

## 2025-06-16 DIAGNOSIS — M47.817 LUMBOSACRAL SPONDYLOSIS WITHOUT MYELOPATHY: ICD-10-CM

## 2025-06-16 DIAGNOSIS — M54.16 LUMBAR RADICULOPATHY: ICD-10-CM

## 2025-06-16 RX ORDER — TRAMADOL HYDROCHLORIDE 50 MG/1
50 TABLET ORAL 2 TIMES DAILY PRN
Qty: 60 TABLET | Refills: 0 | Status: SHIPPED | OUTPATIENT
Start: 2025-06-16 | End: 2025-07-16

## 2025-06-16 NOTE — TELEPHONE ENCOUNTER
Patient states the Tramadol was sent to [facility] not [facility]. She states they told her it would take 7 days to get mailed to her and she won't have enough. She would like to have a short amount sent over to [facility].

## 2025-07-07 RX ORDER — BENAZEPRIL HYDROCHLORIDE 10 MG/1
10 TABLET ORAL DAILY
Qty: 90 TABLET | Refills: 3 | Status: SHIPPED | OUTPATIENT
Start: 2025-07-07

## 2025-07-14 DIAGNOSIS — M54.16 LUMBAR RADICULOPATHY: Primary | ICD-10-CM

## 2025-07-14 DIAGNOSIS — M81.0 AGE-RELATED OSTEOPOROSIS WITHOUT CURRENT PATHOLOGICAL FRACTURE: ICD-10-CM

## 2025-07-14 DIAGNOSIS — M47.817 SPONDYLOSIS OF LUMBOSACRAL SPINE WITHOUT MYELOPATHY: ICD-10-CM

## 2025-07-14 DIAGNOSIS — M47.817 LUMBOSACRAL SPONDYLOSIS WITHOUT MYELOPATHY: ICD-10-CM

## 2025-07-14 RX ORDER — TRAMADOL HYDROCHLORIDE 50 MG/1
50 TABLET ORAL 2 TIMES DAILY
Qty: 60 TABLET | Refills: 0 | Status: SHIPPED | OUTPATIENT
Start: 2025-07-14 | End: 2025-08-13

## 2025-07-21 RX ORDER — LEVOTHYROXINE SODIUM 50 UG/1
50 TABLET ORAL DAILY
Qty: 90 TABLET | Refills: 3 | Status: SHIPPED | OUTPATIENT
Start: 2025-07-21

## 2025-07-21 NOTE — TELEPHONE ENCOUNTER
Pharmacy requesting refill of Levothyroxine Synthroid 50 Mcg Tablet.      Medication active on med list yes      Date of last Rx: 01/22/2025 with 1 refills          verified by SISSY WILHELM      Date of last appointment 02/17/2025    Next Visit Date:  Visit date not found

## 2025-07-28 ENCOUNTER — HOSPITAL ENCOUNTER (OUTPATIENT)
Dept: PAIN MANAGEMENT | Age: 85
Discharge: HOME | End: 2025-07-28
Payer: MEDICARE

## 2025-07-28 VITALS — BODY MASS INDEX: 25.21 KG/M2 | WEIGHT: 137 LBS | HEIGHT: 62 IN

## 2025-07-28 DIAGNOSIS — M47.812 CERVICAL SPONDYLOSIS: Primary | ICD-10-CM

## 2025-07-28 DIAGNOSIS — G89.29 CHRONIC LOW BACK PAIN, UNSPECIFIED BACK PAIN LATERALITY, UNSPECIFIED WHETHER SCIATICA PRESENT: ICD-10-CM

## 2025-07-28 DIAGNOSIS — M47.817 LUMBOSACRAL SPONDYLOSIS WITHOUT MYELOPATHY: ICD-10-CM

## 2025-07-28 DIAGNOSIS — M48.062 SPINAL STENOSIS OF LUMBAR REGION WITH NEUROGENIC CLAUDICATION: Chronic | ICD-10-CM

## 2025-07-28 DIAGNOSIS — M54.16 LUMBAR RADICULOPATHY: ICD-10-CM

## 2025-07-28 DIAGNOSIS — M54.50 CHRONIC LOW BACK PAIN, UNSPECIFIED BACK PAIN LATERALITY, UNSPECIFIED WHETHER SCIATICA PRESENT: ICD-10-CM

## 2025-07-28 PROCEDURE — 99213 OFFICE O/P EST LOW 20 MIN: CPT

## 2025-07-28 PROCEDURE — 99214 OFFICE O/P EST MOD 30 MIN: CPT | Performed by: NURSE PRACTITIONER

## 2025-07-28 ASSESSMENT — ENCOUNTER SYMPTOMS
CONSTIPATION: 0
COUGH: 0
SHORTNESS OF BREATH: 0
BACK PAIN: 1

## 2025-07-28 ASSESSMENT — PAIN SCALES - GENERAL: PAINLEVEL_OUTOF10: 5

## 2025-07-28 ASSESSMENT — PAIN DESCRIPTION - LOCATION: LOCATION: BACK

## 2025-07-28 NOTE — PROGRESS NOTES
history of present illness (including ROS and PFSH) and vital documentation by my staff and I agree with their documentation and have added where applicable.

## 2025-07-31 ENCOUNTER — OFFICE VISIT (OUTPATIENT)
Dept: NEUROLOGY | Age: 85
End: 2025-07-31
Payer: MEDICARE

## 2025-07-31 ENCOUNTER — TELEPHONE (OUTPATIENT)
Dept: NEUROLOGY | Age: 85
End: 2025-07-31

## 2025-07-31 VITALS
WEIGHT: 141 LBS | BODY MASS INDEX: 25.95 KG/M2 | HEART RATE: 60 BPM | SYSTOLIC BLOOD PRESSURE: 149 MMHG | HEIGHT: 62 IN | DIASTOLIC BLOOD PRESSURE: 55 MMHG

## 2025-07-31 DIAGNOSIS — R51.9 CHRONIC DAILY HEADACHE: ICD-10-CM

## 2025-07-31 DIAGNOSIS — M54.81 OCCIPITAL NEURALGIA OF RIGHT SIDE: Primary | ICD-10-CM

## 2025-07-31 PROCEDURE — 1159F MED LIST DOCD IN RCRD: CPT | Performed by: PSYCHIATRY & NEUROLOGY

## 2025-07-31 PROCEDURE — 1123F ACP DISCUSS/DSCN MKR DOCD: CPT | Performed by: PSYCHIATRY & NEUROLOGY

## 2025-07-31 PROCEDURE — 99213 OFFICE O/P EST LOW 20 MIN: CPT | Performed by: PSYCHIATRY & NEUROLOGY

## 2025-07-31 NOTE — PROGRESS NOTES
[facility] NEUROLOGY SPECIALIST  [address]  [place] [address]  Dept: [phone]    PATIENT NAME: Bibi Marrero  PATIENT MRN: [id number]  PRIMARY CARE PHYSICIAN: Chava Suazo DO    HPI:      Bibi Marrero is a 85 y.o. female presenting today as a new patient to me regarding occipital neuralgia.  She was previously followed by Dr. Kinza Landaverde.  Her history is summarized as follows:      Bibi Marrero was referred by Dr. Mckenzie for an ONB.  Patient notes she has had a long history of headaches that her radiate from the occipital to right frontal area starting at the end 2021.  Patient describes it as a burning pain that is constant. Patient notes it is very tender to the touch.  Patient had an MRI cervical spine in 2022 which showed multilevel degenerative changes with bilateral neural foraminal narrowing at multiple levels.     Bibi Marrero got an ONB by Dr. Jean which she reports was not effective. She has been seeing another neurologist. Her gabapentin was increased to 300 mg TID for neuropathy and neuralgia but it has not helped the occipital pain. She was taken off of it so she could take nortriptyline but her neuropathy got worse.  She was prescibred nortriptline 10 mg and fioricet for the headache but it was not effective.      Bibi Marrero underwent ONB with Dr. Landaverde in 2/2024 and this did reduce her pain.  She was on  mg TID as well. She also has neuropathy.      TODAY'S EVALUTION:    Bibi Marrero  was last seen in clinic by me on 1/25/2025.  She reports that she has had only mild twinges of pain in the location of the right greater occipital nerve.  She is not ready yet for an occipital nerve block.  She wishes to schedule an appointment with me at the end of September for this.      HEADACHE SUMMARY:  Headache location: right temporal radiating frontal.  Sometimes occipital pain moving to the front (started in 2021)  Headache quality: burning, tenderness  Associated

## 2025-08-14 DIAGNOSIS — E11.69 TYPE 2 DIABETES MELLITUS WITH OTHER SPECIFIED COMPLICATION, WITHOUT LONG-TERM CURRENT USE OF INSULIN (HCC): ICD-10-CM

## 2025-08-14 DIAGNOSIS — M48.062 SPINAL STENOSIS OF LUMBAR REGION WITH NEUROGENIC CLAUDICATION: Primary | ICD-10-CM

## 2025-08-14 RX ORDER — AVOBENZONE, HOMOSALATE, OCTISALATE, OCTOCRYLENE 30; 40; 45; 26 MG/ML; MG/ML; MG/ML; MG/ML
1 CREAM TOPICAL DAILY
Qty: 100 EACH | Refills: 11 | Status: SHIPPED | OUTPATIENT
Start: 2025-08-14

## 2025-08-14 RX ORDER — TRAMADOL HYDROCHLORIDE 50 MG/1
50 TABLET ORAL 2 TIMES DAILY
COMMUNITY
End: 2025-08-14 | Stop reason: SDUPTHER

## 2025-08-14 RX ORDER — TRAMADOL HYDROCHLORIDE 50 MG/1
50 TABLET ORAL 2 TIMES DAILY
Qty: 60 TABLET | Refills: 0 | Status: SHIPPED | OUTPATIENT
Start: 2025-08-14 | End: 2025-09-13

## 2025-08-27 ENCOUNTER — HOSPITAL ENCOUNTER (OUTPATIENT)
Dept: PAIN MANAGEMENT | Facility: CLINIC | Age: 85
Discharge: HOME | End: 2025-08-27
Payer: MEDICARE

## 2025-08-27 VITALS
HEART RATE: 54 BPM | WEIGHT: 137 LBS | SYSTOLIC BLOOD PRESSURE: 155 MMHG | DIASTOLIC BLOOD PRESSURE: 69 MMHG | OXYGEN SATURATION: 98 % | HEIGHT: 62 IN | TEMPERATURE: 97.6 F | BODY MASS INDEX: 25.21 KG/M2 | RESPIRATION RATE: 15 BRPM

## 2025-08-27 DIAGNOSIS — M47.817 LUMBOSACRAL SPONDYLOSIS WITHOUT MYELOPATHY: Primary | ICD-10-CM

## 2025-08-27 DIAGNOSIS — R52 PAIN MANAGEMENT: ICD-10-CM

## 2025-08-27 LAB — GLUCOSE BLD-MCNC: 101 MG/DL (ref 65–105)

## 2025-08-27 PROCEDURE — 6360000002 HC RX W HCPCS: Performed by: ANESTHESIOLOGY

## 2025-08-27 PROCEDURE — 64635 DESTROY LUMB/SAC FACET JNT: CPT

## 2025-08-27 PROCEDURE — 64636 DESTROY L/S FACET JNT ADDL: CPT

## 2025-08-27 PROCEDURE — 82947 ASSAY GLUCOSE BLOOD QUANT: CPT

## 2025-08-27 RX ORDER — MIDAZOLAM HYDROCHLORIDE 2 MG/2ML
INJECTION, SOLUTION INTRAMUSCULAR; INTRAVENOUS
Status: COMPLETED | OUTPATIENT
Start: 2025-08-27 | End: 2025-08-27

## 2025-08-27 RX ORDER — LIDOCAINE HYDROCHLORIDE 10 MG/ML
INJECTION, SOLUTION EPIDURAL; INFILTRATION; INTRACAUDAL; PERINEURAL
Status: COMPLETED | OUTPATIENT
Start: 2025-08-27 | End: 2025-08-27

## 2025-08-27 RX ORDER — LIDOCAINE HYDROCHLORIDE 40 MG/ML
INJECTION, SOLUTION RETROBULBAR
Status: COMPLETED | OUTPATIENT
Start: 2025-08-27 | End: 2025-08-27

## 2025-08-27 RX ADMIN — LIDOCAINE HYDROCHLORIDE 5 ML: 10 INJECTION, SOLUTION EPIDURAL; INFILTRATION; INTRACAUDAL; PERINEURAL at 10:36

## 2025-08-27 RX ADMIN — LIDOCAINE HYDROCHLORIDE 5 ML: 40 INJECTION, SOLUTION RETROBULBAR; TOPICAL at 10:40

## 2025-08-27 RX ADMIN — MIDAZOLAM HYDROCHLORIDE 1 MG: 1 INJECTION, SOLUTION INTRAMUSCULAR; INTRAVENOUS at 10:36

## 2025-08-27 ASSESSMENT — PAIN SCALES - GENERAL: PAINLEVEL_OUTOF10: 0

## 2025-08-27 ASSESSMENT — PAIN DESCRIPTION - DESCRIPTORS: DESCRIPTORS: ACHING

## 2025-09-05 RX ORDER — AMLODIPINE BESYLATE 5 MG/1
5 TABLET ORAL DAILY
Qty: 90 TABLET | Refills: 1 | Status: SHIPPED | OUTPATIENT
Start: 2025-09-05